# Patient Record
Sex: MALE | Race: WHITE | Employment: UNEMPLOYED | URBAN - METROPOLITAN AREA
[De-identification: names, ages, dates, MRNs, and addresses within clinical notes are randomized per-mention and may not be internally consistent; named-entity substitution may affect disease eponyms.]

---

## 2017-01-01 ENCOUNTER — APPOINTMENT (OUTPATIENT)
Dept: GENERAL RADIOLOGY | Age: 52
DRG: 441 | End: 2017-01-01
Attending: HOSPITALIST
Payer: SELF-PAY

## 2017-01-01 ENCOUNTER — PATIENT OUTREACH (OUTPATIENT)
Dept: INTERNAL MEDICINE CLINIC | Age: 52
End: 2017-01-01

## 2017-01-01 ENCOUNTER — HOSPITAL ENCOUNTER (INPATIENT)
Age: 52
LOS: 4 days | Discharge: HOME | DRG: 442 | End: 2017-07-03
Attending: EMERGENCY MEDICINE | Admitting: INTERNAL MEDICINE
Payer: SELF-PAY

## 2017-01-01 ENCOUNTER — APPOINTMENT (OUTPATIENT)
Dept: ULTRASOUND IMAGING | Age: 52
DRG: 442 | End: 2017-01-01
Attending: PHYSICIAN ASSISTANT
Payer: SELF-PAY

## 2017-01-01 ENCOUNTER — DOCUMENTATION ONLY (OUTPATIENT)
Dept: INTERNAL MEDICINE CLINIC | Age: 52
End: 2017-01-01

## 2017-01-01 ENCOUNTER — APPOINTMENT (OUTPATIENT)
Dept: MRI IMAGING | Age: 52
DRG: 442 | End: 2017-01-01
Attending: INTERNAL MEDICINE
Payer: SELF-PAY

## 2017-01-01 ENCOUNTER — OFFICE VISIT (OUTPATIENT)
Dept: INTERNAL MEDICINE CLINIC | Age: 52
End: 2017-01-01

## 2017-01-01 ENCOUNTER — HOSPITAL ENCOUNTER (INPATIENT)
Age: 52
LOS: 6 days | DRG: 441 | End: 2017-07-16
Attending: EMERGENCY MEDICINE | Admitting: INTERNAL MEDICINE
Payer: SELF-PAY

## 2017-01-01 ENCOUNTER — APPOINTMENT (OUTPATIENT)
Dept: GENERAL RADIOLOGY | Age: 52
DRG: 441 | End: 2017-01-01
Attending: ANESTHESIOLOGY
Payer: SELF-PAY

## 2017-01-01 ENCOUNTER — TELEPHONE (OUTPATIENT)
Dept: INTERNAL MEDICINE CLINIC | Age: 52
End: 2017-01-01

## 2017-01-01 ENCOUNTER — APPOINTMENT (OUTPATIENT)
Dept: CT IMAGING | Age: 52
DRG: 441 | End: 2017-01-01
Attending: INTERNAL MEDICINE
Payer: SELF-PAY

## 2017-01-01 ENCOUNTER — APPOINTMENT (OUTPATIENT)
Dept: ULTRASOUND IMAGING | Age: 52
DRG: 441 | End: 2017-01-01
Attending: INTERNAL MEDICINE
Payer: SELF-PAY

## 2017-01-01 VITALS
HEART RATE: 70 BPM | SYSTOLIC BLOOD PRESSURE: 103 MMHG | WEIGHT: 146.4 LBS | TEMPERATURE: 97.6 F | RESPIRATION RATE: 20 BRPM | DIASTOLIC BLOOD PRESSURE: 70 MMHG | BODY MASS INDEX: 20.5 KG/M2 | HEIGHT: 71 IN | OXYGEN SATURATION: 98 %

## 2017-01-01 VITALS
TEMPERATURE: 99.3 F | DIASTOLIC BLOOD PRESSURE: 27 MMHG | OXYGEN SATURATION: 95 % | RESPIRATION RATE: 22 BRPM | BODY MASS INDEX: 17.8 KG/M2 | SYSTOLIC BLOOD PRESSURE: 56 MMHG | WEIGHT: 131.39 LBS | HEIGHT: 72 IN | HEART RATE: 145 BPM

## 2017-01-01 VITALS
RESPIRATION RATE: 16 BRPM | HEIGHT: 71 IN | TEMPERATURE: 98.3 F | SYSTOLIC BLOOD PRESSURE: 122 MMHG | HEART RATE: 75 BPM | BODY MASS INDEX: 20.58 KG/M2 | DIASTOLIC BLOOD PRESSURE: 76 MMHG | WEIGHT: 147 LBS | OXYGEN SATURATION: 95 %

## 2017-01-01 VITALS
DIASTOLIC BLOOD PRESSURE: 69 MMHG | RESPIRATION RATE: 16 BRPM | OXYGEN SATURATION: 96 % | HEIGHT: 71 IN | SYSTOLIC BLOOD PRESSURE: 101 MMHG | BODY MASS INDEX: 19.4 KG/M2 | WEIGHT: 138.6 LBS | HEART RATE: 85 BPM | TEMPERATURE: 97.7 F

## 2017-01-01 VITALS
DIASTOLIC BLOOD PRESSURE: 60 MMHG | SYSTOLIC BLOOD PRESSURE: 95 MMHG | RESPIRATION RATE: 16 BRPM | WEIGHT: 138.23 LBS | OXYGEN SATURATION: 93 % | BODY MASS INDEX: 19.35 KG/M2 | TEMPERATURE: 98.1 F | HEART RATE: 70 BPM | HEIGHT: 71 IN

## 2017-01-01 DIAGNOSIS — F32.A DEPRESSION, UNSPECIFIED DEPRESSION TYPE: Primary | Chronic | ICD-10-CM

## 2017-01-01 DIAGNOSIS — F32.A DEPRESSION, UNSPECIFIED DEPRESSION TYPE: ICD-10-CM

## 2017-01-01 DIAGNOSIS — B18.1 VIRAL HEPATITIS B CHRONIC (HCC): Primary | ICD-10-CM

## 2017-01-01 DIAGNOSIS — Z23 ENCOUNTER FOR IMMUNIZATION: ICD-10-CM

## 2017-01-01 DIAGNOSIS — R79.89 ABNORMAL LFTS: Primary | ICD-10-CM

## 2017-01-01 DIAGNOSIS — F12.90 MARIJUANA USE: ICD-10-CM

## 2017-01-01 DIAGNOSIS — R05.9 COUGH: Primary | ICD-10-CM

## 2017-01-01 DIAGNOSIS — B20 HIV (HUMAN IMMUNODEFICIENCY VIRUS INFECTION) (HCC): ICD-10-CM

## 2017-01-01 DIAGNOSIS — R17 JAUNDICE: Primary | ICD-10-CM

## 2017-01-01 DIAGNOSIS — B20 HUMAN IMMUNODEFICIENCY VIRUS (HIV) DISEASE (HCC): Primary | ICD-10-CM

## 2017-01-01 DIAGNOSIS — B19.10 HEP B W/O COMA: Primary | ICD-10-CM

## 2017-01-01 DIAGNOSIS — R05.9 COUGH: ICD-10-CM

## 2017-01-01 LAB
AFP-TM SERPL-MCNC: 5.3 NG/ML (ref 0–8.3)
AFP-TM SERPL-MCNC: 8.9 NG/ML (ref 0–8.3)
ALBUMIN SERPL BCP-MCNC: 1.1 G/DL (ref 3.5–5)
ALBUMIN SERPL BCP-MCNC: 1.6 G/DL (ref 3.5–5)
ALBUMIN SERPL BCP-MCNC: 1.7 G/DL (ref 3.5–5)
ALBUMIN SERPL BCP-MCNC: 1.7 G/DL (ref 3.5–5)
ALBUMIN SERPL BCP-MCNC: 1.9 G/DL (ref 3.5–5)
ALBUMIN SERPL BCP-MCNC: 2 G/DL (ref 3.5–5)
ALBUMIN SERPL BCP-MCNC: 2.3 G/DL (ref 3.5–5)
ALBUMIN SERPL BCP-MCNC: 2.3 G/DL (ref 3.5–5)
ALBUMIN SERPL BCP-MCNC: 2.5 G/DL (ref 3.5–5)
ALBUMIN SERPL BCP-MCNC: 2.5 G/DL (ref 3.5–5)
ALBUMIN SERPL BCP-MCNC: 2.6 G/DL (ref 3.5–5)
ALBUMIN SERPL BCP-MCNC: 3.2 G/DL (ref 3.5–5)
ALBUMIN SERPL-MCNC: 4.3 G/DL (ref 3.5–5.5)
ALBUMIN/GLOB SERPL: 0.3 {RATIO} (ref 1.1–2.2)
ALBUMIN/GLOB SERPL: 0.4 {RATIO} (ref 1.1–2.2)
ALBUMIN/GLOB SERPL: 0.5 {RATIO} (ref 1.1–2.2)
ALBUMIN/GLOB SERPL: 0.6 {RATIO} (ref 1.1–2.2)
ALBUMIN/GLOB SERPL: 1.7 {RATIO} (ref 1.1–2.5)
ALP SERPL-CCNC: 111 U/L (ref 45–117)
ALP SERPL-CCNC: 114 U/L (ref 45–117)
ALP SERPL-CCNC: 120 U/L (ref 45–117)
ALP SERPL-CCNC: 121 U/L (ref 45–117)
ALP SERPL-CCNC: 125 U/L (ref 45–117)
ALP SERPL-CCNC: 132 U/L (ref 45–117)
ALP SERPL-CCNC: 133 U/L (ref 45–117)
ALP SERPL-CCNC: 134 U/L (ref 45–117)
ALP SERPL-CCNC: 134 U/L (ref 45–117)
ALP SERPL-CCNC: 136 U/L (ref 45–117)
ALP SERPL-CCNC: 138 U/L (ref 45–117)
ALP SERPL-CCNC: 150 U/L (ref 45–117)
ALP SERPL-CCNC: 76 IU/L (ref 39–117)
ALT SERPL-CCNC: 1064 U/L (ref 12–78)
ALT SERPL-CCNC: 13 IU/L (ref 0–44)
ALT SERPL-CCNC: 289 U/L (ref 12–78)
ALT SERPL-CCNC: 443 U/L (ref 12–78)
ALT SERPL-CCNC: 452 U/L (ref 12–78)
ALT SERPL-CCNC: 500 U/L (ref 12–78)
ALT SERPL-CCNC: 531 U/L (ref 12–78)
ALT SERPL-CCNC: 543 U/L (ref 12–78)
ALT SERPL-CCNC: 680 U/L (ref 12–78)
ALT SERPL-CCNC: 874 U/L (ref 12–78)
ALT SERPL-CCNC: 884 U/L (ref 12–78)
ALT SERPL-CCNC: 895 U/L (ref 12–78)
ALT SERPL-CCNC: 917 U/L (ref 12–78)
AMMONIA PLAS-SCNC: 22 UMOL/L
AMMONIA PLAS-SCNC: 54 UMOL/L
AMMONIA PLAS-SCNC: <10 UMOL/L
AMPHET UR QL SCN: NEGATIVE
ANION GAP BLD CALC-SCNC: 22 MMOL/L (ref 5–15)
ANION GAP BLD CALC-SCNC: 3 MMOL/L (ref 5–15)
ANION GAP BLD CALC-SCNC: 4 MMOL/L (ref 5–15)
ANION GAP BLD CALC-SCNC: 5 MMOL/L (ref 5–15)
ANION GAP BLD CALC-SCNC: 6 MMOL/L (ref 5–15)
APAP SERPL-MCNC: <2 UG/ML (ref 10–30)
APPEARANCE UR: ABNORMAL
APPEARANCE UR: CLEAR
APTT PPP: 43.2 SEC (ref 22.1–32.5)
APTT PPP: 49.7 SEC (ref 22.1–32.5)
ARTERIAL PATENCY WRIST A: YES
AST SERPL W P-5'-P-CCNC: 1067 U/L (ref 15–37)
AST SERPL W P-5'-P-CCNC: 321 U/L (ref 15–37)
AST SERPL W P-5'-P-CCNC: 342 U/L (ref 15–37)
AST SERPL W P-5'-P-CCNC: 378 U/L (ref 15–37)
AST SERPL W P-5'-P-CCNC: 381 U/L (ref 15–37)
AST SERPL W P-5'-P-CCNC: 421 U/L (ref 15–37)
AST SERPL W P-5'-P-CCNC: 439 U/L (ref 15–37)
AST SERPL W P-5'-P-CCNC: 560 U/L (ref 15–37)
AST SERPL W P-5'-P-CCNC: 852 U/L (ref 15–37)
AST SERPL W P-5'-P-CCNC: 868 U/L (ref 15–37)
AST SERPL W P-5'-P-CCNC: 875 U/L (ref 15–37)
AST SERPL W P-5'-P-CCNC: 939 U/L (ref 15–37)
AST SERPL-CCNC: 18 IU/L (ref 0–40)
ATRIAL RATE: 154 BPM
ATRIAL RATE: 77 BPM
BACTERIA SPEC CULT: NORMAL
BACTERIA URNS QL MICRO: NEGATIVE /HPF
BACTERIA URNS QL MICRO: NEGATIVE /HPF
BARBITURATES UR QL SCN: NEGATIVE
BASE DEFICIT BLDA-SCNC: 19.6 MMOL/L
BASOPHILS # BLD AUTO: 0 K/UL (ref 0–0.1)
BASOPHILS # BLD AUTO: 0 X10E3/UL (ref 0–0.2)
BASOPHILS # BLD AUTO: 0 X10E3/UL (ref 0–0.2)
BASOPHILS # BLD: 0 % (ref 0–1)
BASOPHILS NFR BLD AUTO: 0 %
BASOPHILS NFR BLD AUTO: 0 %
BDY SITE: ABNORMAL
BENZODIAZ UR QL: NEGATIVE
BILIRUB DIRECT SERPL-MCNC: 4.6 MG/DL (ref 0–0.2)
BILIRUB SERPL-MCNC: 1.1 MG/DL (ref 0–1.2)
BILIRUB SERPL-MCNC: 13.2 MG/DL (ref 0.2–1)
BILIRUB SERPL-MCNC: 15.3 MG/DL (ref 0.2–1)
BILIRUB SERPL-MCNC: 16.5 MG/DL (ref 0.2–1)
BILIRUB SERPL-MCNC: 17.9 MG/DL (ref 0.2–1)
BILIRUB SERPL-MCNC: 17.9 MG/DL (ref 0.2–1)
BILIRUB SERPL-MCNC: 19.4 MG/DL (ref 0.2–1)
BILIRUB SERPL-MCNC: 21.3 MG/DL (ref 0.2–1)
BILIRUB SERPL-MCNC: 3.8 MG/DL (ref 0.2–1)
BILIRUB SERPL-MCNC: 3.9 MG/DL (ref 0.2–1)
BILIRUB SERPL-MCNC: 4.6 MG/DL (ref 0.2–1)
BILIRUB SERPL-MCNC: 5.3 MG/DL (ref 0.2–1)
BILIRUB SERPL-MCNC: 6.5 MG/DL (ref 0.2–1)
BILIRUB UR QL CFM: NEGATIVE
BILIRUB UR QL CFM: POSITIVE
BREATHS.SPONTANEOUS ON VENT: 25
BUN SERPL-MCNC: 10 MG/DL (ref 6–20)
BUN SERPL-MCNC: 11 MG/DL (ref 6–20)
BUN SERPL-MCNC: 11 MG/DL (ref 6–24)
BUN SERPL-MCNC: 13 MG/DL (ref 6–20)
BUN SERPL-MCNC: 13 MG/DL (ref 6–20)
BUN SERPL-MCNC: 16 MG/DL (ref 6–20)
BUN SERPL-MCNC: 17 MG/DL (ref 6–20)
BUN SERPL-MCNC: 9 MG/DL (ref 6–20)
BUN/CREAT SERPL: 10 (ref 12–20)
BUN/CREAT SERPL: 12 (ref 12–20)
BUN/CREAT SERPL: 13 (ref 12–20)
BUN/CREAT SERPL: 13 (ref 9–20)
BUN/CREAT SERPL: 15 (ref 12–20)
BUN/CREAT SERPL: 15 (ref 12–20)
BUN/CREAT SERPL: 16 (ref 12–20)
BUN/CREAT SERPL: 17 (ref 12–20)
BUN/CREAT SERPL: 18 (ref 12–20)
BUN/CREAT SERPL: 18 (ref 12–20)
BUN/CREAT SERPL: 19 (ref 12–20)
BUN/CREAT SERPL: 20 (ref 12–20)
BUN/CREAT SERPL: 6 (ref 12–20)
CALCIUM SERPL-MCNC: 6.9 MG/DL (ref 8.5–10.1)
CALCIUM SERPL-MCNC: 7.8 MG/DL (ref 8.5–10.1)
CALCIUM SERPL-MCNC: 7.8 MG/DL (ref 8.5–10.1)
CALCIUM SERPL-MCNC: 8.1 MG/DL (ref 8.5–10.1)
CALCIUM SERPL-MCNC: 8.2 MG/DL (ref 8.5–10.1)
CALCIUM SERPL-MCNC: 8.3 MG/DL (ref 8.5–10.1)
CALCIUM SERPL-MCNC: 8.3 MG/DL (ref 8.5–10.1)
CALCIUM SERPL-MCNC: 8.6 MG/DL (ref 8.5–10.1)
CALCIUM SERPL-MCNC: 8.7 MG/DL (ref 8.5–10.1)
CALCIUM SERPL-MCNC: 8.8 MG/DL (ref 8.5–10.1)
CALCIUM SERPL-MCNC: 9.2 MG/DL (ref 8.7–10.2)
CALCULATED P AXIS, ECG09: 69 DEGREES
CALCULATED P AXIS, ECG09: 71 DEGREES
CALCULATED R AXIS, ECG10: 63 DEGREES
CALCULATED R AXIS, ECG10: 80 DEGREES
CALCULATED T AXIS, ECG11: 50 DEGREES
CALCULATED T AXIS, ECG11: 55 DEGREES
CANCER AG19-9 SERPL-ACNC: 7110 U/ML (ref 0–35)
CANNABINOIDS UR QL SCN: POSITIVE
CC UR VC: NORMAL
CD3+CD4+ CELLS # BLD: 569 /UL (ref 359–1519)
CD3+CD4+ CELLS # BLD: 986 /UL (ref 359–1519)
CD3+CD4+ CELLS NFR BLD: 23.7 % (ref 30.8–58.5)
CD3+CD4+ CELLS NFR BLD: 29 % (ref 30.8–58.5)
CEA SERPL-MCNC: 2.6 NG/ML
CHLORIDE SERPL-SCNC: 100 MMOL/L (ref 96–106)
CHLORIDE SERPL-SCNC: 100 MMOL/L (ref 97–108)
CHLORIDE SERPL-SCNC: 101 MMOL/L (ref 97–108)
CHLORIDE SERPL-SCNC: 101 MMOL/L (ref 97–108)
CHLORIDE SERPL-SCNC: 103 MMOL/L (ref 97–108)
CHLORIDE SERPL-SCNC: 94 MMOL/L (ref 97–108)
CHLORIDE SERPL-SCNC: 96 MMOL/L (ref 97–108)
CHLORIDE SERPL-SCNC: 97 MMOL/L (ref 97–108)
CHLORIDE SERPL-SCNC: 98 MMOL/L (ref 97–108)
CHLORIDE SERPL-SCNC: 99 MMOL/L (ref 97–108)
CHLORIDE SERPL-SCNC: 99 MMOL/L (ref 97–108)
CO2 SERPL-SCNC: 19 MMOL/L (ref 21–32)
CO2 SERPL-SCNC: 26 MMOL/L (ref 21–32)
CO2 SERPL-SCNC: 27 MMOL/L (ref 21–32)
CO2 SERPL-SCNC: 28 MMOL/L (ref 21–32)
CO2 SERPL-SCNC: 29 MMOL/L (ref 18–29)
CO2 SERPL-SCNC: 29 MMOL/L (ref 21–32)
CO2 SERPL-SCNC: 30 MMOL/L (ref 21–32)
CO2 SERPL-SCNC: 30 MMOL/L (ref 21–32)
CO2 SERPL-SCNC: 31 MMOL/L (ref 21–32)
CO2 SERPL-SCNC: 32 MMOL/L (ref 21–32)
CO2 SERPL-SCNC: 33 MMOL/L (ref 21–32)
COCAINE UR QL SCN: NEGATIVE
COLOR UR: ABNORMAL
COLOR UR: ABNORMAL
CREAT SERPL-MCNC: 0.55 MG/DL (ref 0.7–1.3)
CREAT SERPL-MCNC: 0.55 MG/DL (ref 0.7–1.3)
CREAT SERPL-MCNC: 0.59 MG/DL (ref 0.7–1.3)
CREAT SERPL-MCNC: 0.6 MG/DL (ref 0.7–1.3)
CREAT SERPL-MCNC: 0.65 MG/DL (ref 0.7–1.3)
CREAT SERPL-MCNC: 0.69 MG/DL (ref 0.7–1.3)
CREAT SERPL-MCNC: 0.71 MG/DL (ref 0.7–1.3)
CREAT SERPL-MCNC: 0.79 MG/DL (ref 0.7–1.3)
CREAT SERPL-MCNC: 0.81 MG/DL (ref 0.7–1.3)
CREAT SERPL-MCNC: 0.87 MG/DL (ref 0.76–1.27)
CREAT SERPL-MCNC: 0.91 MG/DL (ref 0.7–1.3)
CREAT SERPL-MCNC: 0.94 MG/DL (ref 0.7–1.3)
CREAT SERPL-MCNC: 2.76 MG/DL (ref 0.7–1.3)
DIAGNOSIS, 93000: NORMAL
DIAGNOSIS, 93000: NORMAL
DRUG SCRN COMMENT,DRGCM: ABNORMAL
EOSINOPHIL # BLD AUTO: 0.3 X10E3/UL (ref 0–0.4)
EOSINOPHIL # BLD AUTO: 0.4 X10E3/UL (ref 0–0.4)
EOSINOPHIL # BLD: 0.1 K/UL (ref 0–0.4)
EOSINOPHIL # BLD: 0.2 K/UL (ref 0–0.4)
EOSINOPHIL # BLD: 0.3 K/UL (ref 0–0.4)
EOSINOPHIL # BLD: 0.4 K/UL (ref 0–0.4)
EOSINOPHIL # BLD: 0.4 K/UL (ref 0–0.4)
EOSINOPHIL NFR BLD AUTO: 5 %
EOSINOPHIL NFR BLD AUTO: 6 %
EOSINOPHIL NFR BLD: 1 % (ref 0–7)
EOSINOPHIL NFR BLD: 3 % (ref 0–7)
EOSINOPHIL NFR BLD: 4 % (ref 0–7)
EOSINOPHIL NFR BLD: 5 % (ref 0–7)
EOSINOPHIL NFR BLD: 7 % (ref 0–7)
EPITH CASTS URNS QL MICRO: ABNORMAL /LPF
EPITH CASTS URNS QL MICRO: ABNORMAL /LPF
ERYTHROCYTE [DISTWIDTH] IN BLOOD BY AUTOMATED COUNT: 13.7 % (ref 12.3–15.4)
ERYTHROCYTE [DISTWIDTH] IN BLOOD BY AUTOMATED COUNT: 14.1 % (ref 11.5–14.5)
ERYTHROCYTE [DISTWIDTH] IN BLOOD BY AUTOMATED COUNT: 14.2 % (ref 11.5–14.5)
ERYTHROCYTE [DISTWIDTH] IN BLOOD BY AUTOMATED COUNT: 14.4 % (ref 11.5–14.5)
ERYTHROCYTE [DISTWIDTH] IN BLOOD BY AUTOMATED COUNT: 14.4 % (ref 12.3–15.4)
ERYTHROCYTE [DISTWIDTH] IN BLOOD BY AUTOMATED COUNT: 14.7 % (ref 11.5–14.5)
ERYTHROCYTE [DISTWIDTH] IN BLOOD BY AUTOMATED COUNT: 14.8 % (ref 11.5–14.5)
ERYTHROCYTE [DISTWIDTH] IN BLOOD BY AUTOMATED COUNT: 16.6 % (ref 11.5–14.5)
ERYTHROCYTE [DISTWIDTH] IN BLOOD BY AUTOMATED COUNT: 16.7 % (ref 11.5–14.5)
ERYTHROCYTE [DISTWIDTH] IN BLOOD BY AUTOMATED COUNT: 17.2 % (ref 11.5–14.5)
ERYTHROCYTE [DISTWIDTH] IN BLOOD BY AUTOMATED COUNT: 18.5 % (ref 11.5–14.5)
ETHANOL SERPL-MCNC: <10 MG/DL
FIO2 ON VENT: 100 %
GAS FLOW.O2 O2 DELIVERY SYS: 15 L/MIN
GLOBULIN SER CALC-MCNC: 2.5 G/DL (ref 2–4)
GLOBULIN SER CALC-MCNC: 2.6 G/DL (ref 1.5–4.5)
GLOBULIN SER CALC-MCNC: 4.1 G/DL (ref 2–4)
GLOBULIN SER CALC-MCNC: 4.4 G/DL (ref 2–4)
GLOBULIN SER CALC-MCNC: 4.5 G/DL (ref 2–4)
GLOBULIN SER CALC-MCNC: 4.6 G/DL (ref 2–4)
GLOBULIN SER CALC-MCNC: 4.6 G/DL (ref 2–4)
GLOBULIN SER CALC-MCNC: 4.7 G/DL (ref 2–4)
GLOBULIN SER CALC-MCNC: 4.8 G/DL (ref 2–4)
GLOBULIN SER CALC-MCNC: 5 G/DL (ref 2–4)
GLOBULIN SER CALC-MCNC: 5.2 G/DL (ref 2–4)
GLUCOSE SERPL-MCNC: 101 MG/DL (ref 65–100)
GLUCOSE SERPL-MCNC: 105 MG/DL (ref 65–100)
GLUCOSE SERPL-MCNC: 110 MG/DL (ref 65–100)
GLUCOSE SERPL-MCNC: 67 MG/DL (ref 65–100)
GLUCOSE SERPL-MCNC: 83 MG/DL (ref 65–100)
GLUCOSE SERPL-MCNC: 85 MG/DL (ref 65–100)
GLUCOSE SERPL-MCNC: 86 MG/DL (ref 65–100)
GLUCOSE SERPL-MCNC: 88 MG/DL (ref 65–100)
GLUCOSE SERPL-MCNC: 88 MG/DL (ref 65–100)
GLUCOSE SERPL-MCNC: 93 MG/DL (ref 65–100)
GLUCOSE SERPL-MCNC: 95 MG/DL (ref 65–99)
GLUCOSE SERPL-MCNC: 98 MG/DL (ref 65–100)
GLUCOSE SERPL-MCNC: 99 MG/DL (ref 65–100)
GLUCOSE UR STRIP.AUTO-MCNC: NEGATIVE MG/DL
GLUCOSE UR STRIP.AUTO-MCNC: NEGATIVE MG/DL
HAV IGM SERPL QL IA: NONREACTIVE
HBV CORE AB SERPL QL IA: POSITIVE
HBV CORE IGM SER QL: NONREACTIVE
HBV DNA SERPL NAA+PROBE-ACNC: NORMAL IU/ML
HBV DNA SERPL NAA+PROBE-LOG IU: NORMAL LOG10IU/ML
HBV E AB SERPL QL IA: NEGATIVE
HBV E AG SERPL QL IA: POSITIVE
HBV SURFACE AB SER QL: NONREACTIVE
HBV SURFACE AB SER-ACNC: <3.1 MIU/ML
HBV SURFACE AG SER QL: 654.91 INDEX
HBV SURFACE AG SER QL: POSITIVE
HCO3 BLDA-SCNC: 15 MMOL/L (ref 22–26)
HCT VFR BLD AUTO: 29.2 % (ref 36.6–50.3)
HCT VFR BLD AUTO: 38.3 % (ref 36.6–50.3)
HCT VFR BLD AUTO: 42.1 % (ref 36.6–50.3)
HCT VFR BLD AUTO: 43.1 % (ref 36.6–50.3)
HCT VFR BLD AUTO: 43.8 % (ref 36.6–50.3)
HCT VFR BLD AUTO: 44.1 % (ref 36.6–50.3)
HCT VFR BLD AUTO: 44.2 % (ref 36.6–50.3)
HCT VFR BLD AUTO: 44.2 % (ref 37.5–51)
HCT VFR BLD AUTO: 44.9 % (ref 36.6–50.3)
HCT VFR BLD AUTO: 47.5 % (ref 37.5–51)
HCT VFR BLD AUTO: 48.7 % (ref 36.6–50.3)
HCV AB SERPL QL IA: NONREACTIVE
HCV COMMENT,HCGAC: ABNORMAL
HGB BLD-MCNC: 13.1 G/DL (ref 12.1–17)
HGB BLD-MCNC: 15 G/DL (ref 12.1–17)
HGB BLD-MCNC: 15.1 G/DL (ref 12.1–17)
HGB BLD-MCNC: 15.2 G/DL (ref 12.1–17)
HGB BLD-MCNC: 15.2 G/DL (ref 12.1–17)
HGB BLD-MCNC: 15.4 G/DL (ref 12.1–17)
HGB BLD-MCNC: 15.4 G/DL (ref 12.1–17)
HGB BLD-MCNC: 15.5 G/DL (ref 12.6–17.7)
HGB BLD-MCNC: 16.9 G/DL (ref 12.6–17.7)
HGB BLD-MCNC: 17 G/DL (ref 12.1–17)
HGB BLD-MCNC: 9.7 G/DL (ref 12.1–17)
HGB UR QL STRIP: NEGATIVE
HGB UR QL STRIP: NEGATIVE
HIV GENOSURE, 551656: NORMAL
HIV RT+PR MUT DET ISLT: NORMAL
HIV1 RNA # SERPL NAA+PROBE: <20 COPIES/ML
HIV1 RNA # SERPL NAA+PROBE: NORMAL COPIES/ML
HIV1 RNA SERPL NAA+PROBE-LOG#: 5.06 LOG10COPY/ML
HIV1 RNA SERPL NAA+PROBE-LOG#: NORMAL LOG10COPY/ML
IMM GRANULOCYTES # BLD: 0 X10E3/UL (ref 0–0.1)
IMM GRANULOCYTES # BLD: 0 X10E3/UL (ref 0–0.1)
IMM GRANULOCYTES NFR BLD: 0 %
IMM GRANULOCYTES NFR BLD: 0 %
INR PPP: 1.6 (ref 0.9–1.1)
INR PPP: 1.6 (ref 0.9–1.1)
INR PPP: 1.7 (ref 0.9–1.1)
INR PPP: 1.8 (ref 0.9–1.1)
INR PPP: 1.9 (ref 0.9–1.1)
INR PPP: 2.4 (ref 0.9–1.1)
INR PPP: 2.7 (ref 0.9–1.1)
INR PPP: 2.7 (ref 0.9–1.1)
INR PPP: 2.8 (ref 0.9–1.1)
INR PPP: 2.9 (ref 0.9–1.1)
INR PPP: 3.3 (ref 0.9–1.1)
INR PPP: 4.2 (ref 0.9–1.1)
KETONES UR QL STRIP.AUTO: ABNORMAL MG/DL
KETONES UR QL STRIP.AUTO: NEGATIVE MG/DL
LACTATE SERPL-SCNC: 1.9 MMOL/L (ref 0.4–2)
LACTATE SERPL-SCNC: 16.3 MMOL/L (ref 0.4–2)
LACTATE SERPL-SCNC: 2.5 MMOL/L (ref 0.4–2)
LACTATE SERPL-SCNC: 2.6 MMOL/L (ref 0.4–2)
LACTATE SERPL-SCNC: 3.6 MMOL/L (ref 0.4–2)
LEUKOCYTE ESTERASE UR QL STRIP.AUTO: ABNORMAL
LEUKOCYTE ESTERASE UR QL STRIP.AUTO: NEGATIVE
LIPASE SERPL-CCNC: 157 U/L (ref 73–393)
LYMPHOCYTES # BLD AUTO: 2.4 X10E3/UL (ref 0.7–3.1)
LYMPHOCYTES # BLD AUTO: 25 % (ref 12–49)
LYMPHOCYTES # BLD AUTO: 3.4 X10E3/UL (ref 0.7–3.1)
LYMPHOCYTES # BLD AUTO: 39 % (ref 12–49)
LYMPHOCYTES # BLD AUTO: 40 % (ref 12–49)
LYMPHOCYTES # BLD AUTO: 46 % (ref 12–49)
LYMPHOCYTES # BLD AUTO: 51 % (ref 12–49)
LYMPHOCYTES # BLD: 2.3 K/UL (ref 0.8–3.5)
LYMPHOCYTES # BLD: 2.4 K/UL (ref 0.8–3.5)
LYMPHOCYTES # BLD: 2.7 K/UL (ref 0.8–3.5)
LYMPHOCYTES # BLD: 3.5 K/UL (ref 0.8–3.5)
LYMPHOCYTES # BLD: 3.8 K/UL (ref 0.8–3.5)
LYMPHOCYTES NFR BLD AUTO: 38 %
LYMPHOCYTES NFR BLD AUTO: 47 %
Lab: NORMAL
MAGNESIUM SERPL-MCNC: 2 MG/DL (ref 1.6–2.4)
MCH RBC QN AUTO: 28.8 PG (ref 26–34)
MCH RBC QN AUTO: 28.8 PG (ref 26–34)
MCH RBC QN AUTO: 29.5 PG (ref 26–34)
MCH RBC QN AUTO: 29.7 PG (ref 26–34)
MCH RBC QN AUTO: 29.8 PG (ref 26–34)
MCH RBC QN AUTO: 29.9 PG (ref 26–34)
MCH RBC QN AUTO: 30 PG (ref 26.6–33)
MCH RBC QN AUTO: 30 PG (ref 26–34)
MCH RBC QN AUTO: 30.3 PG (ref 26–34)
MCH RBC QN AUTO: 30.3 PG (ref 26–34)
MCH RBC QN AUTO: 33.7 PG (ref 26.6–33)
MCHC RBC AUTO-ENTMCNC: 33.2 G/DL (ref 30–36.5)
MCHC RBC AUTO-ENTMCNC: 34.2 G/DL (ref 30–36.5)
MCHC RBC AUTO-ENTMCNC: 34.3 G/DL (ref 30–36.5)
MCHC RBC AUTO-ENTMCNC: 34.5 G/DL (ref 30–36.5)
MCHC RBC AUTO-ENTMCNC: 34.5 G/DL (ref 30–36.5)
MCHC RBC AUTO-ENTMCNC: 34.8 G/DL (ref 30–36.5)
MCHC RBC AUTO-ENTMCNC: 34.9 G/DL (ref 30–36.5)
MCHC RBC AUTO-ENTMCNC: 35.1 G/DL (ref 31.5–35.7)
MCHC RBC AUTO-ENTMCNC: 35.3 G/DL (ref 30–36.5)
MCHC RBC AUTO-ENTMCNC: 35.6 G/DL (ref 30–36.5)
MCHC RBC AUTO-ENTMCNC: 35.6 G/DL (ref 31.5–35.7)
MCV RBC AUTO: 83.5 FL (ref 80–99)
MCV RBC AUTO: 83.5 FL (ref 80–99)
MCV RBC AUTO: 84.2 FL (ref 80–99)
MCV RBC AUTO: 86 FL (ref 79–97)
MCV RBC AUTO: 86.6 FL (ref 80–99)
MCV RBC AUTO: 86.7 FL (ref 80–99)
MCV RBC AUTO: 86.7 FL (ref 80–99)
MCV RBC AUTO: 86.8 FL (ref 80–99)
MCV RBC AUTO: 86.8 FL (ref 80–99)
MCV RBC AUTO: 87 FL (ref 80–99)
MCV RBC AUTO: 95 FL (ref 79–97)
METHADONE UR QL: NEGATIVE
MONOCYTES # BLD AUTO: 0.6 X10E3/UL (ref 0.1–0.9)
MONOCYTES # BLD AUTO: 0.7 X10E3/UL (ref 0.1–0.9)
MONOCYTES # BLD: 0.6 K/UL (ref 0–1)
MONOCYTES # BLD: 0.6 K/UL (ref 0–1)
MONOCYTES # BLD: 1 K/UL (ref 0–1)
MONOCYTES # BLD: 1.3 K/UL (ref 0–1)
MONOCYTES # BLD: 1.4 K/UL (ref 0–1)
MONOCYTES NFR BLD AUTO: 11 %
MONOCYTES NFR BLD AUTO: 12 % (ref 5–13)
MONOCYTES NFR BLD AUTO: 13 % (ref 5–13)
MONOCYTES NFR BLD AUTO: 14 % (ref 5–13)
MONOCYTES NFR BLD AUTO: 14 % (ref 5–13)
MONOCYTES NFR BLD AUTO: 8 %
MONOCYTES NFR BLD AUTO: 9 % (ref 5–13)
MUCOUS THREADS URNS QL MICRO: ABNORMAL /LPF
NEUTROPHILS # BLD AUTO: 1.8 X10E3/UL (ref 1.4–7)
NEUTROPHILS # BLD AUTO: 4.4 X10E3/UL (ref 1.4–7)
NEUTROPHILS NFR BLD AUTO: 36 %
NEUTROPHILS NFR BLD AUTO: 49 %
NEUTS SEG # BLD: 1.8 K/UL (ref 1.8–8)
NEUTS SEG # BLD: 2.5 K/UL (ref 1.8–8)
NEUTS SEG # BLD: 3.1 K/UL (ref 1.8–8)
NEUTS SEG # BLD: 3.9 K/UL (ref 1.8–8)
NEUTS SEG # BLD: 5.8 K/UL (ref 1.8–8)
NEUTS SEG NFR BLD AUTO: 35 % (ref 32–75)
NEUTS SEG NFR BLD AUTO: 35 % (ref 32–75)
NEUTS SEG NFR BLD AUTO: 42 % (ref 32–75)
NEUTS SEG NFR BLD AUTO: 44 % (ref 32–75)
NEUTS SEG NFR BLD AUTO: 61 % (ref 32–75)
NITRITE UR QL STRIP.AUTO: NEGATIVE
NITRITE UR QL STRIP.AUTO: NEGATIVE
OPIATES UR QL: NEGATIVE
P-R INTERVAL, ECG05: 120 MS
P-R INTERVAL, ECG05: 122 MS
PCO2 BLDA: 85 MMHG (ref 35–45)
PCP UR QL: NEGATIVE
PH BLDA: 6.87 [PH] (ref 7.35–7.45)
PH UR STRIP: 6 [PH] (ref 5–8)
PH UR STRIP: 6 [PH] (ref 5–8)
PLATELET # BLD AUTO: 101 K/UL (ref 150–400)
PLATELET # BLD AUTO: 122 X10E3/UL (ref 150–379)
PLATELET # BLD AUTO: 123 K/UL (ref 150–400)
PLATELET # BLD AUTO: 138 K/UL (ref 150–400)
PLATELET # BLD AUTO: 141 K/UL (ref 150–400)
PLATELET # BLD AUTO: 148 K/UL (ref 150–400)
PLATELET # BLD AUTO: 173 K/UL (ref 150–400)
PLATELET # BLD AUTO: 205 X10E3/UL (ref 150–379)
PLATELET # BLD AUTO: 84 K/UL (ref 150–400)
PLATELET # BLD AUTO: 99 K/UL (ref 150–400)
PLATELET # BLD AUTO: 99 K/UL (ref 150–400)
PO2 BLDA: 43 MMHG (ref 80–100)
POTASSIUM SERPL-SCNC: 3.5 MMOL/L (ref 3.5–5.1)
POTASSIUM SERPL-SCNC: 3.6 MMOL/L (ref 3.5–5.1)
POTASSIUM SERPL-SCNC: 3.6 MMOL/L (ref 3.5–5.1)
POTASSIUM SERPL-SCNC: 3.8 MMOL/L (ref 3.5–5.1)
POTASSIUM SERPL-SCNC: 3.8 MMOL/L (ref 3.5–5.1)
POTASSIUM SERPL-SCNC: 4 MMOL/L (ref 3.5–5.1)
POTASSIUM SERPL-SCNC: 4.2 MMOL/L (ref 3.5–5.1)
POTASSIUM SERPL-SCNC: 4.4 MMOL/L (ref 3.5–5.1)
POTASSIUM SERPL-SCNC: 4.5 MMOL/L (ref 3.5–5.1)
POTASSIUM SERPL-SCNC: 4.5 MMOL/L (ref 3.5–5.2)
POTASSIUM SERPL-SCNC: 5 MMOL/L (ref 3.5–5.1)
PROT SERPL-MCNC: 3.6 G/DL (ref 6.4–8.2)
PROT SERPL-MCNC: 6.1 G/DL (ref 6.4–8.2)
PROT SERPL-MCNC: 6.1 G/DL (ref 6.4–8.2)
PROT SERPL-MCNC: 6.2 G/DL (ref 6.4–8.2)
PROT SERPL-MCNC: 6.4 G/DL (ref 6.4–8.2)
PROT SERPL-MCNC: 6.8 G/DL (ref 6.4–8.2)
PROT SERPL-MCNC: 6.9 G/DL (ref 6.4–8.2)
PROT SERPL-MCNC: 6.9 G/DL (ref 6.4–8.2)
PROT SERPL-MCNC: 6.9 G/DL (ref 6–8.5)
PROT SERPL-MCNC: 7 G/DL (ref 6.4–8.2)
PROT SERPL-MCNC: 7.1 G/DL (ref 6.4–8.2)
PROT SERPL-MCNC: 7.1 G/DL (ref 6.4–8.2)
PROT SERPL-MCNC: 8.4 G/DL (ref 6.4–8.2)
PROT UR STRIP-MCNC: NEGATIVE MG/DL
PROT UR STRIP-MCNC: NEGATIVE MG/DL
PROTHROMBIN TIME: 16.3 SEC (ref 9–11.1)
PROTHROMBIN TIME: 16.4 SEC (ref 9–11.1)
PROTHROMBIN TIME: 17.4 SEC (ref 9–11.1)
PROTHROMBIN TIME: 18 SEC (ref 9–11.1)
PROTHROMBIN TIME: 19.2 SEC (ref 9–11.1)
PROTHROMBIN TIME: 25.3 SEC (ref 9–11.1)
PROTHROMBIN TIME: 27.7 SEC (ref 9–11.1)
PROTHROMBIN TIME: 28.1 SEC (ref 9–11.1)
PROTHROMBIN TIME: 29.4 SEC (ref 9–11.1)
PROTHROMBIN TIME: 29.8 SEC (ref 9–11.1)
PROTHROMBIN TIME: 34.1 SEC (ref 9–11.1)
PROTHROMBIN TIME: 44.8 SEC (ref 9–11.1)
Q-T INTERVAL, ECG07: 330 MS
Q-T INTERVAL, ECG07: 372 MS
QRS DURATION, ECG06: 74 MS
QRS DURATION, ECG06: 78 MS
QTC CALCULATION (BEZET), ECG08: 420 MS
QTC CALCULATION (BEZET), ECG08: 528 MS
RBC # BLD AUTO: 3.37 M/UL (ref 4.1–5.7)
RBC # BLD AUTO: 4.55 M/UL (ref 4.1–5.7)
RBC # BLD AUTO: 5.02 X10E6/UL (ref 4.14–5.8)
RBC # BLD AUTO: 5.04 M/UL (ref 4.1–5.7)
RBC # BLD AUTO: 5.05 M/UL (ref 4.1–5.7)
RBC # BLD AUTO: 5.07 M/UL (ref 4.1–5.7)
RBC # BLD AUTO: 5.09 M/UL (ref 4.1–5.7)
RBC # BLD AUTO: 5.16 M/UL (ref 4.1–5.7)
RBC # BLD AUTO: 5.16 X10E6/UL (ref 4.14–5.8)
RBC # BLD AUTO: 5.18 M/UL (ref 4.1–5.7)
RBC # BLD AUTO: 5.61 M/UL (ref 4.1–5.7)
RBC #/AREA URNS HPF: ABNORMAL /HPF (ref 0–5)
RBC #/AREA URNS HPF: ABNORMAL /HPF (ref 0–5)
REFERENCE LAB,REFLB: NORMAL
SALICYLATES SERPL-MCNC: <1.7 MG/DL (ref 2.8–20)
SAO2 % BLD: 47 % (ref 92–97)
SAO2% DEVICE SAO2% SENSOR NAME: ABNORMAL
SERVICE CMNT-IMP: ABNORMAL
SERVICE CMNT-IMP: NORMAL
SODIUM SERPL-SCNC: 132 MMOL/L (ref 136–145)
SODIUM SERPL-SCNC: 133 MMOL/L (ref 136–145)
SODIUM SERPL-SCNC: 134 MMOL/L (ref 136–145)
SODIUM SERPL-SCNC: 134 MMOL/L (ref 136–145)
SODIUM SERPL-SCNC: 135 MMOL/L (ref 136–145)
SODIUM SERPL-SCNC: 137 MMOL/L (ref 136–145)
SODIUM SERPL-SCNC: 138 MMOL/L (ref 136–145)
SODIUM SERPL-SCNC: 141 MMOL/L (ref 134–144)
SP GR UR REFRACTOMETRY: 1.02 (ref 1–1.03)
SP GR UR REFRACTOMETRY: 1.02 (ref 1–1.03)
SP1: ABNORMAL
SP2: ABNORMAL
SP3: ABNORMAL
SPECIMEN SITE: ABNORMAL
TEST DESCRIPTION:,ATST: NORMAL
TEST INFORMATION: NORMAL
THERAPEUTIC RANGE,PTTT: ABNORMAL SECS (ref 58–77)
THERAPEUTIC RANGE,PTTT: ABNORMAL SECS (ref 58–77)
TSH SERPL DL<=0.05 MIU/L-ACNC: 0.98 UIU/ML (ref 0.36–3.74)
UA: UC IF INDICATED,UAUC: ABNORMAL
UA: UC IF INDICATED,UAUC: ABNORMAL
UROBILINOGEN UR QL STRIP.AUTO: 1 EU/DL (ref 0.2–1)
UROBILINOGEN UR QL STRIP.AUTO: 1 EU/DL (ref 0.2–1)
VENTILATION MODE VENT: ABNORMAL
VENTRICULAR RATE, ECG03: 154 BPM
VENTRICULAR RATE, ECG03: 77 BPM
WBC # BLD AUTO: 1.3 K/UL (ref 4.1–11.1)
WBC # BLD AUTO: 5.1 K/UL (ref 4.1–11.1)
WBC # BLD AUTO: 5.1 X10E3/UL (ref 3.4–10.8)
WBC # BLD AUTO: 5.2 K/UL (ref 4.1–11.1)
WBC # BLD AUTO: 5.4 K/UL (ref 4.1–11.1)
WBC # BLD AUTO: 5.6 K/UL (ref 4.1–11.1)
WBC # BLD AUTO: 6.9 K/UL (ref 4.1–11.1)
WBC # BLD AUTO: 7 K/UL (ref 4.1–11.1)
WBC # BLD AUTO: 8.9 X10E3/UL (ref 3.4–10.8)
WBC # BLD AUTO: 9.4 K/UL (ref 4.1–11.1)
WBC # BLD AUTO: 9.6 K/UL (ref 4.1–11.1)
WBC URNS QL MICRO: ABNORMAL /HPF (ref 0–4)
WBC URNS QL MICRO: ABNORMAL /HPF (ref 0–4)

## 2017-01-01 PROCEDURE — 36415 COLL VENOUS BLD VENIPUNCTURE: CPT | Performed by: INTERNAL MEDICINE

## 2017-01-01 PROCEDURE — 85610 PROTHROMBIN TIME: CPT | Performed by: PHYSICIAN ASSISTANT

## 2017-01-01 PROCEDURE — 83605 ASSAY OF LACTIC ACID: CPT | Performed by: INTERNAL MEDICINE

## 2017-01-01 PROCEDURE — 74011250637 HC RX REV CODE- 250/637: Performed by: INTERNAL MEDICINE

## 2017-01-01 PROCEDURE — 82140 ASSAY OF AMMONIA: CPT | Performed by: INTERNAL MEDICINE

## 2017-01-01 PROCEDURE — 87086 URINE CULTURE/COLONY COUNT: CPT | Performed by: EMERGENCY MEDICINE

## 2017-01-01 PROCEDURE — 74011000250 HC RX REV CODE- 250

## 2017-01-01 PROCEDURE — 85025 COMPLETE CBC W/AUTO DIFF WBC: CPT | Performed by: INTERNAL MEDICINE

## 2017-01-01 PROCEDURE — P9047 ALBUMIN (HUMAN), 25%, 50ML: HCPCS | Performed by: INTERNAL MEDICINE

## 2017-01-01 PROCEDURE — 74183 MRI ABD W/O CNTR FLWD CNTR: CPT

## 2017-01-01 PROCEDURE — 76700 US EXAM ABDOM COMPLETE: CPT

## 2017-01-01 PROCEDURE — 85610 PROTHROMBIN TIME: CPT | Performed by: INTERNAL MEDICINE

## 2017-01-01 PROCEDURE — 0BH17EZ INSERTION OF ENDOTRACHEAL AIRWAY INTO TRACHEA, VIA NATURAL OR ARTIFICIAL OPENING: ICD-10-PCS | Performed by: ANESTHESIOLOGY

## 2017-01-01 PROCEDURE — 65660000000 HC RM CCU STEPDOWN

## 2017-01-01 PROCEDURE — 85025 COMPLETE CBC W/AUTO DIFF WBC: CPT | Performed by: EMERGENCY MEDICINE

## 2017-01-01 PROCEDURE — 85730 THROMBOPLASTIN TIME PARTIAL: CPT | Performed by: PHYSICIAN ASSISTANT

## 2017-01-01 PROCEDURE — 74011250636 HC RX REV CODE- 250/636: Performed by: INTERNAL MEDICINE

## 2017-01-01 PROCEDURE — 80053 COMPREHEN METABOLIC PANEL: CPT | Performed by: HOSPITALIST

## 2017-01-01 PROCEDURE — 85027 COMPLETE CBC AUTOMATED: CPT | Performed by: INTERNAL MEDICINE

## 2017-01-01 PROCEDURE — 65270000015 HC RM PRIVATE ONCOLOGY

## 2017-01-01 PROCEDURE — 99285 EMERGENCY DEPT VISIT HI MDM: CPT

## 2017-01-01 PROCEDURE — 80053 COMPREHEN METABOLIC PANEL: CPT | Performed by: INTERNAL MEDICINE

## 2017-01-01 PROCEDURE — 81001 URINALYSIS AUTO W/SCOPE: CPT | Performed by: EMERGENCY MEDICINE

## 2017-01-01 PROCEDURE — 80307 DRUG TEST PRSMV CHEM ANLYZR: CPT | Performed by: PHYSICIAN ASSISTANT

## 2017-01-01 PROCEDURE — 81001 URINALYSIS AUTO W/SCOPE: CPT | Performed by: PHYSICIAN ASSISTANT

## 2017-01-01 PROCEDURE — 74011000258 HC RX REV CODE- 258: Performed by: HOSPITALIST

## 2017-01-01 PROCEDURE — 87350 HEPATITIS BE AG IA: CPT | Performed by: INTERNAL MEDICINE

## 2017-01-01 PROCEDURE — 74011250636 HC RX REV CODE- 250/636: Performed by: HOSPITALIST

## 2017-01-01 PROCEDURE — 86361 T CELL ABSOLUTE COUNT: CPT | Performed by: INTERNAL MEDICINE

## 2017-01-01 PROCEDURE — 71010 XR CHEST PORT: CPT

## 2017-01-01 PROCEDURE — 31500 INSERT EMERGENCY AIRWAY: CPT

## 2017-01-01 PROCEDURE — 86707 HEPATITIS BE ANTIBODY: CPT | Performed by: INTERNAL MEDICINE

## 2017-01-01 PROCEDURE — 82105 ALPHA-FETOPROTEIN SERUM: CPT | Performed by: INTERNAL MEDICINE

## 2017-01-01 PROCEDURE — 82140 ASSAY OF AMMONIA: CPT | Performed by: EMERGENCY MEDICINE

## 2017-01-01 PROCEDURE — 74011000258 HC RX REV CODE- 258: Performed by: INTERNAL MEDICINE

## 2017-01-01 PROCEDURE — 86301 IMMUNOASSAY TUMOR CA 19-9: CPT | Performed by: INTERNAL MEDICINE

## 2017-01-01 PROCEDURE — 36415 COLL VENOUS BLD VENIPUNCTURE: CPT | Performed by: EMERGENCY MEDICINE

## 2017-01-01 PROCEDURE — 82803 BLOOD GASES ANY COMBINATION: CPT | Performed by: HOSPITALIST

## 2017-01-01 PROCEDURE — 82105 ALPHA-FETOPROTEIN SERUM: CPT | Performed by: HOSPITALIST

## 2017-01-01 PROCEDURE — 74177 CT ABD & PELVIS W/CONTRAST: CPT

## 2017-01-01 PROCEDURE — 77030011256 HC DRSG MEPILEX <16IN NO BORD MOLN -A

## 2017-01-01 PROCEDURE — 36600 WITHDRAWAL OF ARTERIAL BLOOD: CPT | Performed by: HOSPITALIST

## 2017-01-01 PROCEDURE — 86705 HEP B CORE ANTIBODY IGM: CPT | Performed by: INTERNAL MEDICINE

## 2017-01-01 PROCEDURE — 74000 XR ABD PORT  1 V: CPT

## 2017-01-01 PROCEDURE — 87901 NFCT AGT GNTYP ALYS HIV1 REV: CPT | Performed by: INTERNAL MEDICINE

## 2017-01-01 PROCEDURE — 87517 HEPATITIS B DNA QUANT: CPT | Performed by: INTERNAL MEDICINE

## 2017-01-01 PROCEDURE — 82378 CARCINOEMBRYONIC ANTIGEN: CPT | Performed by: INTERNAL MEDICINE

## 2017-01-01 PROCEDURE — 93005 ELECTROCARDIOGRAM TRACING: CPT

## 2017-01-01 PROCEDURE — 80053 COMPREHEN METABOLIC PANEL: CPT | Performed by: EMERGENCY MEDICINE

## 2017-01-01 PROCEDURE — 93041 RHYTHM ECG TRACING: CPT

## 2017-01-01 PROCEDURE — 85610 PROTHROMBIN TIME: CPT | Performed by: EMERGENCY MEDICINE

## 2017-01-01 PROCEDURE — 74011636320 HC RX REV CODE- 636/320: Performed by: HOSPITALIST

## 2017-01-01 PROCEDURE — 97116 GAIT TRAINING THERAPY: CPT

## 2017-01-01 PROCEDURE — 86706 HEP B SURFACE ANTIBODY: CPT | Performed by: INTERNAL MEDICINE

## 2017-01-01 PROCEDURE — 5A1935Z RESPIRATORY VENTILATION, LESS THAN 24 CONSECUTIVE HOURS: ICD-10-PCS | Performed by: ANESTHESIOLOGY

## 2017-01-01 PROCEDURE — 86704 HEP B CORE ANTIBODY TOTAL: CPT | Performed by: INTERNAL MEDICINE

## 2017-01-01 PROCEDURE — 85025 COMPLETE CBC W/AUTO DIFF WBC: CPT | Performed by: PHYSICIAN ASSISTANT

## 2017-01-01 PROCEDURE — 80053 COMPREHEN METABOLIC PANEL: CPT | Performed by: PHYSICIAN ASSISTANT

## 2017-01-01 PROCEDURE — 83735 ASSAY OF MAGNESIUM: CPT | Performed by: INTERNAL MEDICINE

## 2017-01-01 PROCEDURE — 76705 ECHO EXAM OF ABDOMEN: CPT

## 2017-01-01 PROCEDURE — 83690 ASSAY OF LIPASE: CPT | Performed by: INTERNAL MEDICINE

## 2017-01-01 PROCEDURE — 02H633Z INSERTION OF INFUSION DEVICE INTO RIGHT ATRIUM, PERCUTANEOUS APPROACH: ICD-10-PCS | Performed by: ANESTHESIOLOGY

## 2017-01-01 PROCEDURE — 36415 COLL VENOUS BLD VENIPUNCTURE: CPT | Performed by: PHYSICIAN ASSISTANT

## 2017-01-01 PROCEDURE — 80074 ACUTE HEPATITIS PANEL: CPT | Performed by: PHYSICIAN ASSISTANT

## 2017-01-01 PROCEDURE — 94761 N-INVAS EAR/PLS OXIMETRY MLT: CPT

## 2017-01-01 PROCEDURE — 85730 THROMBOPLASTIN TIME PARTIAL: CPT | Performed by: EMERGENCY MEDICINE

## 2017-01-01 PROCEDURE — 77030018846 HC SOL IRR STRL H20 ICUM -A

## 2017-01-01 PROCEDURE — 97161 PT EVAL LOW COMPLEX 20 MIN: CPT

## 2017-01-01 PROCEDURE — 74020 XR ABD (AP AND ERECT OR DECUB): CPT

## 2017-01-01 PROCEDURE — 74011250636 HC RX REV CODE- 250/636

## 2017-01-01 PROCEDURE — 74011000250 HC RX REV CODE- 250: Performed by: INTERNAL MEDICINE

## 2017-01-01 PROCEDURE — 87340 HEPATITIS B SURFACE AG IA: CPT | Performed by: INTERNAL MEDICINE

## 2017-01-01 PROCEDURE — 74011250637 HC RX REV CODE- 250/637: Performed by: HOSPITALIST

## 2017-01-01 PROCEDURE — 87516 HEPATITIS B DNA AMP PROBE: CPT

## 2017-01-01 PROCEDURE — 94002 VENT MGMT INPAT INIT DAY: CPT

## 2017-01-01 PROCEDURE — A9577 INJ MULTIHANCE: HCPCS | Performed by: INTERNAL MEDICINE

## 2017-01-01 PROCEDURE — 82248 BILIRUBIN DIRECT: CPT | Performed by: INTERNAL MEDICINE

## 2017-01-01 PROCEDURE — 87536 HIV-1 QUANT&REVRSE TRNSCRPJ: CPT | Performed by: INTERNAL MEDICINE

## 2017-01-01 PROCEDURE — 84443 ASSAY THYROID STIM HORMONE: CPT | Performed by: INTERNAL MEDICINE

## 2017-01-01 PROCEDURE — 83605 ASSAY OF LACTIC ACID: CPT | Performed by: EMERGENCY MEDICINE

## 2017-01-01 PROCEDURE — 74011250636 HC RX REV CODE- 250/636: Performed by: ANESTHESIOLOGY

## 2017-01-01 PROCEDURE — 86317 IMMUNOASSAY INFECTIOUS AGENT: CPT

## 2017-01-01 RX ORDER — SODIUM CHLORIDE 9 MG/ML
75 INJECTION, SOLUTION INTRAVENOUS CONTINUOUS
Status: DISCONTINUED | OUTPATIENT
Start: 2017-01-01 | End: 2017-01-01 | Stop reason: HOSPADM

## 2017-01-01 RX ORDER — FACIAL-BODY WIPES
10 EACH TOPICAL DAILY PRN
Status: DISCONTINUED | OUTPATIENT
Start: 2017-01-01 | End: 2017-01-01 | Stop reason: HOSPADM

## 2017-01-01 RX ORDER — NADOLOL 20 MG/1
20 TABLET ORAL DAILY
Qty: 90 TAB | Refills: 2 | Status: SHIPPED | OUTPATIENT
Start: 2017-01-01 | End: 2017-01-01

## 2017-01-01 RX ORDER — LANOLIN ALCOHOL/MO/W.PET/CERES
3 CREAM (GRAM) TOPICAL
Status: DISCONTINUED | OUTPATIENT
Start: 2017-01-01 | End: 2017-01-01 | Stop reason: HOSPADM

## 2017-01-01 RX ORDER — EMTRICITABINE AND TENOFOVIR DISOPROXIL FUMARATE 200; 300 MG/1; MG/1
1 TABLET, FILM COATED ORAL DAILY
Status: DISCONTINUED | OUTPATIENT
Start: 2017-01-01 | End: 2017-01-01 | Stop reason: HOSPADM

## 2017-01-01 RX ORDER — MORPHINE SULFATE 4 MG/ML
0.5 INJECTION, SOLUTION INTRAMUSCULAR; INTRAVENOUS
Status: DISCONTINUED | OUTPATIENT
Start: 2017-01-01 | End: 2017-01-01 | Stop reason: HOSPADM

## 2017-01-01 RX ORDER — MORPHINE SULFATE 4 MG/ML
0.5 INJECTION, SOLUTION INTRAMUSCULAR; INTRAVENOUS
Status: DISCONTINUED | OUTPATIENT
Start: 2017-01-01 | End: 2017-01-01

## 2017-01-01 RX ORDER — CHLORHEXIDINE GLUCONATE 1.2 MG/ML
15 RINSE ORAL EVERY 12 HOURS
Status: DISCONTINUED | OUTPATIENT
Start: 2017-01-01 | End: 2017-01-01 | Stop reason: HOSPADM

## 2017-01-01 RX ORDER — VENLAFAXINE HYDROCHLORIDE 75 MG/1
75 CAPSULE, EXTENDED RELEASE ORAL DAILY
COMMUNITY

## 2017-01-01 RX ORDER — EMTRICITABINE AND TENOFOVIR DISOPROXIL FUMARATE 200; 300 MG/1; MG/1
1 TABLET, FILM COATED ORAL DAILY
Qty: 10 TAB | Refills: 0 | Status: SHIPPED
Start: 2017-01-01 | End: 2017-01-01

## 2017-01-01 RX ORDER — NADOLOL 20 MG/1
20 TABLET ORAL DAILY
COMMUNITY
End: 2017-01-01

## 2017-01-01 RX ORDER — MORPHINE SULFATE 4 MG/ML
2 INJECTION, SOLUTION INTRAMUSCULAR; INTRAVENOUS
Status: DISCONTINUED | OUTPATIENT
Start: 2017-01-01 | End: 2017-01-01

## 2017-01-01 RX ORDER — LEVOFLOXACIN 5 MG/ML
750 INJECTION, SOLUTION INTRAVENOUS
Status: DISCONTINUED | OUTPATIENT
Start: 2017-07-18 | End: 2017-01-01 | Stop reason: HOSPADM

## 2017-01-01 RX ORDER — VENLAFAXINE HYDROCHLORIDE 75 MG/1
75 CAPSULE, EXTENDED RELEASE ORAL DAILY
Qty: 30 CAP | Refills: 3 | Status: SHIPPED | OUTPATIENT
Start: 2017-01-01 | End: 2017-01-01 | Stop reason: SDUPTHER

## 2017-01-01 RX ORDER — OXYCODONE HYDROCHLORIDE 5 MG/1
5 TABLET ORAL
Status: DISCONTINUED | OUTPATIENT
Start: 2017-01-01 | End: 2017-01-01 | Stop reason: HOSPADM

## 2017-01-01 RX ORDER — MUPIROCIN 20 MG/G
OINTMENT TOPICAL 2 TIMES DAILY
Status: DISCONTINUED | OUTPATIENT
Start: 2017-01-01 | End: 2017-01-01 | Stop reason: HOSPADM

## 2017-01-01 RX ORDER — SODIUM BICARBONATE 1 MEQ/ML
SYRINGE (ML) INTRAVENOUS
Status: COMPLETED
Start: 2017-01-01 | End: 2017-01-01

## 2017-01-01 RX ORDER — NOREPINEPHRINE BITARTRATE 1 MG/ML
INJECTION, SOLUTION INTRAVENOUS
Status: COMPLETED
Start: 2017-01-01 | End: 2017-01-01

## 2017-01-01 RX ORDER — PHENYLEPHRINE HYDROCHLORIDE 10 MG/ML
INJECTION INTRAVENOUS
Status: COMPLETED
Start: 2017-01-01 | End: 2017-01-01

## 2017-01-01 RX ORDER — ACETAMINOPHEN 325 MG/1
650 TABLET ORAL
Status: DISCONTINUED | OUTPATIENT
Start: 2017-01-01 | End: 2017-01-01

## 2017-01-01 RX ORDER — PROPOFOL 10 MG/ML
INJECTION, EMULSION INTRAVENOUS
Status: DISCONTINUED
Start: 2017-01-01 | End: 2017-01-01 | Stop reason: HOSPADM

## 2017-01-01 RX ORDER — NALOXONE HYDROCHLORIDE 0.4 MG/ML
0.4 INJECTION, SOLUTION INTRAMUSCULAR; INTRAVENOUS; SUBCUTANEOUS AS NEEDED
Status: DISCONTINUED | OUTPATIENT
Start: 2017-01-01 | End: 2017-01-01 | Stop reason: HOSPADM

## 2017-01-01 RX ORDER — SODIUM CHLORIDE 0.9 % (FLUSH) 0.9 %
5-10 SYRINGE (ML) INJECTION EVERY 8 HOURS
Status: DISCONTINUED | OUTPATIENT
Start: 2017-01-01 | End: 2017-01-01 | Stop reason: HOSPADM

## 2017-01-01 RX ORDER — PROPOFOL 10 MG/ML
40 INJECTION, EMULSION INTRAVENOUS
Status: COMPLETED | OUTPATIENT
Start: 2017-01-01 | End: 2017-01-01

## 2017-01-01 RX ORDER — SODIUM CHLORIDE 0.9 % (FLUSH) 0.9 %
5-10 SYRINGE (ML) INJECTION AS NEEDED
Status: DISCONTINUED | OUTPATIENT
Start: 2017-01-01 | End: 2017-01-01 | Stop reason: HOSPADM

## 2017-01-01 RX ORDER — OXYCODONE HYDROCHLORIDE 5 MG/1
5 TABLET ORAL
COMMUNITY

## 2017-01-01 RX ORDER — SUCCINYLCHOLINE CHLORIDE 20 MG/ML
INJECTION INTRAMUSCULAR; INTRAVENOUS
Status: DISCONTINUED
Start: 2017-01-01 | End: 2017-01-01 | Stop reason: HOSPADM

## 2017-01-01 RX ORDER — DOPAMINE HYDROCHLORIDE 320 MG/100ML
0-20 INJECTION, SOLUTION INTRAVENOUS
Status: DISCONTINUED | OUTPATIENT
Start: 2017-01-01 | End: 2017-01-01 | Stop reason: HOSPADM

## 2017-01-01 RX ORDER — VENLAFAXINE HYDROCHLORIDE 75 MG/1
75 CAPSULE, EXTENDED RELEASE ORAL DAILY
Qty: 30 CAP | Refills: 3 | Status: SHIPPED | OUTPATIENT
Start: 2017-01-01 | End: 2017-01-01

## 2017-01-01 RX ORDER — PROPRANOLOL HYDROCHLORIDE 10 MG/1
10 TABLET ORAL 2 TIMES DAILY
Status: DISCONTINUED | OUTPATIENT
Start: 2017-01-01 | End: 2017-01-01

## 2017-01-01 RX ORDER — MORPHINE SULFATE 2 MG/ML
0.5 INJECTION, SOLUTION INTRAMUSCULAR; INTRAVENOUS
Status: DISCONTINUED | OUTPATIENT
Start: 2017-01-01 | End: 2017-01-01

## 2017-01-01 RX ORDER — SODIUM CHLORIDE 9 MG/ML
75 INJECTION, SOLUTION INTRAVENOUS CONTINUOUS
Status: DISCONTINUED | OUTPATIENT
Start: 2017-01-01 | End: 2017-01-01

## 2017-01-01 RX ORDER — THERA TABS 400 MCG
1 TAB ORAL DAILY
Status: DISCONTINUED | OUTPATIENT
Start: 2017-01-01 | End: 2017-01-01 | Stop reason: HOSPADM

## 2017-01-01 RX ORDER — PANTOPRAZOLE SODIUM 40 MG/1
40 TABLET, DELAYED RELEASE ORAL
Status: DISCONTINUED | OUTPATIENT
Start: 2017-01-01 | End: 2017-01-01 | Stop reason: HOSPADM

## 2017-01-01 RX ORDER — SODIUM CHLORIDE 9 MG/ML
50 INJECTION, SOLUTION INTRAVENOUS
Status: COMPLETED | OUTPATIENT
Start: 2017-01-01 | End: 2017-01-01

## 2017-01-01 RX ORDER — ALBUMIN HUMAN 250 G/1000ML
12.5 SOLUTION INTRAVENOUS ONCE
Status: COMPLETED | OUTPATIENT
Start: 2017-01-01 | End: 2017-01-01

## 2017-01-01 RX ORDER — VENLAFAXINE HYDROCHLORIDE 75 MG/1
75 CAPSULE, EXTENDED RELEASE ORAL DAILY
Status: DISCONTINUED | OUTPATIENT
Start: 2017-01-01 | End: 2017-01-01

## 2017-01-01 RX ORDER — ENOXAPARIN SODIUM 100 MG/ML
40 INJECTION SUBCUTANEOUS EVERY 24 HOURS
Status: DISCONTINUED | OUTPATIENT
Start: 2017-01-01 | End: 2017-01-01

## 2017-01-01 RX ORDER — SODIUM CHLORIDE 0.9 % (FLUSH) 0.9 %
10 SYRINGE (ML) INJECTION
Status: COMPLETED | OUTPATIENT
Start: 2017-01-01 | End: 2017-01-01

## 2017-01-01 RX ORDER — ONDANSETRON 2 MG/ML
4 INJECTION INTRAMUSCULAR; INTRAVENOUS
Status: DISCONTINUED | OUTPATIENT
Start: 2017-01-01 | End: 2017-01-01 | Stop reason: HOSPADM

## 2017-01-01 RX ORDER — LEVOFLOXACIN 5 MG/ML
750 INJECTION, SOLUTION INTRAVENOUS EVERY 24 HOURS
Status: DISCONTINUED | OUTPATIENT
Start: 2017-01-01 | End: 2017-01-01

## 2017-01-01 RX ORDER — PHENYLEPHRINE HYDROCHLORIDE 10 MG/ML
INJECTION INTRAVENOUS
Status: DISCONTINUED
Start: 2017-01-01 | End: 2017-01-01 | Stop reason: HOSPADM

## 2017-01-01 RX ORDER — SODIUM BICARBONATE 1 MEQ/ML
100 SYRINGE (ML) INTRAVENOUS ONCE
Status: COMPLETED | OUTPATIENT
Start: 2017-01-01 | End: 2017-01-01

## 2017-01-01 RX ADMIN — ALBUMIN (HUMAN) 12.5 G: 0.25 INJECTION, SOLUTION INTRAVENOUS at 06:01

## 2017-01-01 RX ADMIN — RALTEGRAVIR 400 MG: 400 TABLET, FILM COATED ORAL at 08:57

## 2017-01-01 RX ADMIN — SODIUM BICARBONATE: 84 INJECTION, SOLUTION INTRAVENOUS at 07:00

## 2017-01-01 RX ADMIN — PHENYLEPHRINE HYDROCHLORIDE 300 MCG/MIN: 10 INJECTION INTRAVENOUS at 13:22

## 2017-01-01 RX ADMIN — PHYTONADIONE 5 MG: 10 INJECTION, EMULSION INTRAMUSCULAR; INTRAVENOUS; SUBCUTANEOUS at 21:00

## 2017-01-01 RX ADMIN — Medication 10 ML: at 22:31

## 2017-01-01 RX ADMIN — NOREPINEPHRINE BITARTRATE 200 MCG/MIN: 1 INJECTION, SOLUTION, CONCENTRATE INTRAVENOUS at 08:09

## 2017-01-01 RX ADMIN — Medication 10 ML: at 05:24

## 2017-01-01 RX ADMIN — MUPIROCIN: 20 OINTMENT TOPICAL at 10:23

## 2017-01-01 RX ADMIN — RILPIVIRINE HYDROCHLORIDE 25 MG: 25 TABLET, FILM COATED ORAL at 18:43

## 2017-01-01 RX ADMIN — ACETYLCYSTEINE 3140 MG: 200 INJECTION, SOLUTION INTRAVENOUS at 18:46

## 2017-01-01 RX ADMIN — Medication 10 ML: at 20:35

## 2017-01-01 RX ADMIN — Medication 10 ML: at 20:59

## 2017-01-01 RX ADMIN — THERA TABS 1 TABLET: TAB at 09:27

## 2017-01-01 RX ADMIN — RILPIVIRINE HYDROCHLORIDE 25 MG: 25 TABLET, FILM COATED ORAL at 18:51

## 2017-01-01 RX ADMIN — PANTOPRAZOLE SODIUM 40 MG: 40 TABLET, DELAYED RELEASE ORAL at 08:19

## 2017-01-01 RX ADMIN — RILPIVIRINE HYDROCHLORIDE 25 MG: 25 TABLET, FILM COATED ORAL at 17:11

## 2017-01-01 RX ADMIN — SODIUM CHLORIDE 75 ML/HR: 900 INJECTION, SOLUTION INTRAVENOUS at 11:49

## 2017-01-01 RX ADMIN — EMTRICITABINE AND TENOFOVIR DISOPROXIL FUMARATE 1 TABLET: 200; 300 TABLET, FILM COATED ORAL at 10:02

## 2017-01-01 RX ADMIN — EMTRICITABINE AND TENOFOVIR DISOPROXIL FUMARATE 1 TABLET: 200; 300 TABLET, FILM COATED ORAL at 09:26

## 2017-01-01 RX ADMIN — MELATONIN 3 MG ORAL TABLET 3 MG: 3 TABLET ORAL at 22:44

## 2017-01-01 RX ADMIN — RALTEGRAVIR 400 MG: 400 TABLET, FILM COATED ORAL at 11:31

## 2017-01-01 RX ADMIN — ACETYLCYSTEINE 6300 MG: 200 INJECTION, SOLUTION INTRAVENOUS at 14:49

## 2017-01-01 RX ADMIN — ONDANSETRON 4 MG: 2 INJECTION INTRAMUSCULAR; INTRAVENOUS at 08:51

## 2017-01-01 RX ADMIN — Medication 10 ML: at 06:24

## 2017-01-01 RX ADMIN — SODIUM CHLORIDE 75 ML/HR: 900 INJECTION, SOLUTION INTRAVENOUS at 10:38

## 2017-01-01 RX ADMIN — PHENYLEPHRINE HYDROCHLORIDE 300 MCG/MIN: 10 INJECTION INTRAVENOUS at 09:45

## 2017-01-01 RX ADMIN — PROMETHAZINE HYDROCHLORIDE 25 MG: 25 INJECTION, SOLUTION INTRAMUSCULAR; INTRAVENOUS at 02:56

## 2017-01-01 RX ADMIN — PHYTONADIONE 5 MG: 10 INJECTION, EMULSION INTRAMUSCULAR; INTRAVENOUS; SUBCUTANEOUS at 10:08

## 2017-01-01 RX ADMIN — THERA TABS 1 TABLET: TAB at 08:45

## 2017-01-01 RX ADMIN — ONDANSETRON 4 MG: 2 INJECTION INTRAMUSCULAR; INTRAVENOUS at 21:31

## 2017-01-01 RX ADMIN — EMTRICITABINE AND TENOFOVIR DISOPROXIL FUMARATE 1 TABLET: 200; 300 TABLET, FILM COATED ORAL at 10:08

## 2017-01-01 RX ADMIN — Medication 10 ML: at 06:11

## 2017-01-01 RX ADMIN — EMTRICITABINE AND TENOFOVIR DISOPROXIL FUMARATE 1 TABLET: 200; 300 TABLET, FILM COATED ORAL at 22:33

## 2017-01-01 RX ADMIN — Medication 10 ML: at 13:04

## 2017-01-01 RX ADMIN — SODIUM CHLORIDE 50 ML/HR: 900 INJECTION, SOLUTION INTRAVENOUS at 04:31

## 2017-01-01 RX ADMIN — NOREPINEPHRINE BITARTRATE 200 MCG/MIN: 1 INJECTION, SOLUTION, CONCENTRATE INTRAVENOUS at 12:34

## 2017-01-01 RX ADMIN — Medication 10 ML: at 05:07

## 2017-01-01 RX ADMIN — PHYTONADIONE 5 MG: 10 INJECTION, EMULSION INTRAMUSCULAR; INTRAVENOUS; SUBCUTANEOUS at 11:11

## 2017-01-01 RX ADMIN — PHENYLEPHRINE HYDROCHLORIDE 300 MCG/MIN: 10 INJECTION INTRAVENOUS at 11:38

## 2017-01-01 RX ADMIN — RALTEGRAVIR 400 MG: 400 TABLET, FILM COATED ORAL at 10:07

## 2017-01-01 RX ADMIN — OXYCODONE HYDROCHLORIDE 5 MG: 5 TABLET ORAL at 16:12

## 2017-01-01 RX ADMIN — ACETYLCYSTEINE 6300 MG: 200 INJECTION, SOLUTION INTRAVENOUS at 04:22

## 2017-01-01 RX ADMIN — MELATONIN 3 MG ORAL TABLET 3 MG: 3 TABLET ORAL at 23:03

## 2017-01-01 RX ADMIN — MELATONIN 3 MG ORAL TABLET 3 MG: 3 TABLET ORAL at 22:31

## 2017-01-01 RX ADMIN — THERA TABS 1 TABLET: TAB at 08:46

## 2017-01-01 RX ADMIN — DOPAMINE HYDROCHLORIDE IN DEXTROSE 5 MCG/KG/MIN: 3.2 INJECTION, SOLUTION INTRAVENOUS at 09:04

## 2017-01-01 RX ADMIN — PHENYLEPHRINE HYDROCHLORIDE 10000 MCG: 10 INJECTION INTRAVENOUS at 06:37

## 2017-01-01 RX ADMIN — PANTOPRAZOLE SODIUM 40 MG: 40 TABLET, DELAYED RELEASE ORAL at 08:28

## 2017-01-01 RX ADMIN — LEVOFLOXACIN 750 MG: 5 INJECTION, SOLUTION INTRAVENOUS at 07:45

## 2017-01-01 RX ADMIN — ONDANSETRON 4 MG: 2 INJECTION INTRAMUSCULAR; INTRAVENOUS at 11:43

## 2017-01-01 RX ADMIN — PHYTONADIONE 10 MG: 10 INJECTION, EMULSION INTRAMUSCULAR; INTRAVENOUS; SUBCUTANEOUS at 11:33

## 2017-01-01 RX ADMIN — Medication 10 ML: at 22:44

## 2017-01-01 RX ADMIN — PANTOPRAZOLE SODIUM 40 MG: 40 TABLET, DELAYED RELEASE ORAL at 08:52

## 2017-01-01 RX ADMIN — Medication 10 ML: at 13:35

## 2017-01-01 RX ADMIN — VASOPRESSIN 0.04 UNITS/MIN: 20 INJECTION INTRAVENOUS at 07:34

## 2017-01-01 RX ADMIN — SODIUM CHLORIDE 75 ML/HR: 900 INJECTION, SOLUTION INTRAVENOUS at 20:59

## 2017-01-01 RX ADMIN — ONDANSETRON 4 MG: 2 INJECTION INTRAMUSCULAR; INTRAVENOUS at 09:26

## 2017-01-01 RX ADMIN — PROPRANOLOL HYDROCHLORIDE 10 MG: 10 TABLET ORAL at 18:48

## 2017-01-01 RX ADMIN — PIPERACILLIN SODIUM,TAZOBACTAM SODIUM 3.38 G: 3; .375 INJECTION, POWDER, FOR SOLUTION INTRAVENOUS at 09:38

## 2017-01-01 RX ADMIN — EMTRICITABINE AND TENOFOVIR DISOPROXIL FUMARATE 1 TABLET: 200; 300 TABLET, FILM COATED ORAL at 08:19

## 2017-01-01 RX ADMIN — RALTEGRAVIR 400 MG: 400 TABLET, FILM COATED ORAL at 12:44

## 2017-01-01 RX ADMIN — PANTOPRAZOLE SODIUM 40 MG: 40 TABLET, DELAYED RELEASE ORAL at 08:58

## 2017-01-01 RX ADMIN — PROPRANOLOL HYDROCHLORIDE 10 MG: 10 TABLET ORAL at 21:03

## 2017-01-01 RX ADMIN — RALTEGRAVIR 400 MG: 400 TABLET, FILM COATED ORAL at 20:19

## 2017-01-01 RX ADMIN — ACETYLCYSTEINE 6300 MG: 200 INJECTION, SOLUTION INTRAVENOUS at 23:33

## 2017-01-01 RX ADMIN — PHYTONADIONE 5 MG: 10 INJECTION, EMULSION INTRAMUSCULAR; INTRAVENOUS; SUBCUTANEOUS at 21:03

## 2017-01-01 RX ADMIN — RALTEGRAVIR 400 MG: 400 TABLET, FILM COATED ORAL at 13:20

## 2017-01-01 RX ADMIN — RILPIVIRINE HYDROCHLORIDE 25 MG: 25 TABLET, FILM COATED ORAL at 13:12

## 2017-01-01 RX ADMIN — ACETYLCYSTEINE 6300 MG: 200 INJECTION, SOLUTION INTRAVENOUS at 08:45

## 2017-01-01 RX ADMIN — PHENYLEPHRINE HYDROCHLORIDE 300 MCG/MIN: 10 INJECTION INTRAVENOUS at 08:00

## 2017-01-01 RX ADMIN — THERA TABS 1 TABLET: TAB at 10:02

## 2017-01-01 RX ADMIN — EMTRICITABINE AND TENOFOVIR DISOPROXIL FUMARATE 1 TABLET: 200; 300 TABLET, FILM COATED ORAL at 08:58

## 2017-01-01 RX ADMIN — RALTEGRAVIR 400 MG: 400 TABLET, FILM COATED ORAL at 22:32

## 2017-01-01 RX ADMIN — VASOPRESSIN 0.04 UNITS/MIN: 20 INJECTION INTRAVENOUS at 11:14

## 2017-01-01 RX ADMIN — IOPAMIDOL 100 ML: 755 INJECTION, SOLUTION INTRAVENOUS at 04:31

## 2017-01-01 RX ADMIN — RALTEGRAVIR 400 MG: 400 TABLET, FILM COATED ORAL at 17:31

## 2017-01-01 RX ADMIN — PHYTONADIONE 10 MG: 10 INJECTION, EMULSION INTRAMUSCULAR; INTRAVENOUS; SUBCUTANEOUS at 10:03

## 2017-01-01 RX ADMIN — Medication 3 MG: at 21:27

## 2017-01-01 RX ADMIN — RALTEGRAVIR 400 MG: 400 TABLET, FILM COATED ORAL at 17:53

## 2017-01-01 RX ADMIN — RALTEGRAVIR 400 MG: 400 TABLET, FILM COATED ORAL at 21:27

## 2017-01-01 RX ADMIN — GADOBENATE DIMEGLUMINE 12 ML: 529 INJECTION, SOLUTION INTRAVENOUS at 18:24

## 2017-01-01 RX ADMIN — RALTEGRAVIR 400 MG: 400 TABLET, FILM COATED ORAL at 17:38

## 2017-01-01 RX ADMIN — CHLORHEXIDINE GLUCONATE 15 ML: 1.2 RINSE ORAL at 10:24

## 2017-01-01 RX ADMIN — PROPOFOL 40 MG: 10 INJECTION, EMULSION INTRAVENOUS at 07:20

## 2017-01-01 RX ADMIN — SODIUM CHLORIDE 75 ML/HR: 900 INJECTION, SOLUTION INTRAVENOUS at 02:56

## 2017-01-01 RX ADMIN — EMTRICITABINE AND TENOFOVIR DISOPROXIL FUMARATE 1 TABLET: 200; 300 TABLET, FILM COATED ORAL at 10:07

## 2017-01-01 RX ADMIN — NOREPINEPHRINE BITARTRATE 200 MCG/MIN: 1 INJECTION, SOLUTION, CONCENTRATE INTRAVENOUS at 11:12

## 2017-01-01 RX ADMIN — SODIUM CHLORIDE 500 ML: 900 INJECTION, SOLUTION INTRAVENOUS at 21:04

## 2017-01-01 RX ADMIN — EMTRICITABINE AND TENOFOVIR DISOPROXIL FUMARATE 1 TABLET: 200; 300 TABLET, FILM COATED ORAL at 08:28

## 2017-01-01 RX ADMIN — ACETYLCYSTEINE 6300 MG: 200 INJECTION, SOLUTION INTRAVENOUS at 23:17

## 2017-01-01 RX ADMIN — SODIUM BICARBONATE 100 MEQ: 84 INJECTION, SOLUTION INTRAVENOUS at 10:35

## 2017-01-01 RX ADMIN — PHENYLEPHRINE HYDROCHLORIDE 300 MCG/KG/MIN: 10 INJECTION INTRAVENOUS at 06:00

## 2017-01-01 RX ADMIN — NOREPINEPHRINE BITARTRATE 8000 MCG: 1 INJECTION INTRAVENOUS at 06:36

## 2017-01-01 RX ADMIN — ACETYLCYSTEINE 9440 MG: 200 INJECTION, SOLUTION INTRAVENOUS at 17:46

## 2017-01-01 RX ADMIN — Medication 10 ML: at 13:06

## 2017-01-01 RX ADMIN — EMTRICITABINE AND TENOFOVIR DISOPROXIL FUMARATE 1 TABLET: 200; 300 TABLET, FILM COATED ORAL at 08:45

## 2017-01-01 RX ADMIN — ACETYLCYSTEINE 6300 MG: 200 INJECTION, SOLUTION INTRAVENOUS at 07:14

## 2017-01-01 RX ADMIN — Medication 10 ML: at 04:31

## 2017-01-01 RX ADMIN — THERA TABS 1 TABLET: TAB at 08:57

## 2017-01-20 NOTE — TELEPHONE ENCOUNTER
Pt called and states that he is needing a call back in regards to getting a referral to see a liver specialist and also about if he can be prescribed anti depressant medication. Please call pt to advise.

## 2017-01-20 NOTE — TELEPHONE ENCOUNTER
I called and spoke with patient. Pt scheduled for appointment to discuss antidepressants. He states that he will get information for liver specialist when he comes in for his appointment on Monday with Dr Irvin Hashimoto.

## 2017-01-23 NOTE — MR AVS SNAPSHOT
Visit Information Date & Time Provider Department Dept. Phone Encounter #  
 1/23/2017 10:15 AM Earl Bocanegra, [address] [facility] [phone] [id number] Follow-up Instructions Return in about 4 months (around 5/23/2017), or if symptoms worsen or fail to improve. Your Appointments 1/27/2017  2:00 PM  
ROUTINE CARE with Brett Baron III, DO [facility] Appt Note: depression medication [facility] [address] [id number]  
[address] [facility]  [address] Upcoming Health Maintenance Date Due Hepatitis C Screening 1965 FOBT Q 1 YEAR AGE 50-75 5/19/2013 Pneumococcal 19-64 Highest Risk (3 of 3 - PCV13) 8/8/2014 DTaP/Tdap/Td series (2 - Td) 8/8/2021 Allergies as of 1/23/2017  Review Complete On: 1/23/2017 By: Earl Bocanegra MD  
 No Known Allergies Current Immunizations  Reviewed on 1/23/2017 Name Date Influenza Vaccine 9/20/2016, 11/5/2015, 11/3/2014, 10/16/2013 Influenza Vaccine Whole 11/15/2011 PPD 3/30/2009 Pneumococcal Polysaccharide (PPSV-23) 8/8/2013 Pneumococcal Vaccine (Unspecified Type) 10/9/2008 TDAP Vaccine 8/8/2011 Reviewed by Earl Bocanegra MD on 1/23/2017 at 10:20 AM  
You Were Diagnosed With   
  
 Codes Comments Human immunodeficiency virus (HIV) disease ([facility] 75.)    -  Primary ICD-10-CM: B20 
ICD-9-CM: 743 Cough     ICD-10-CM: R05 ICD-9-CM: 170. 2 Vitals BP Pulse Temp Resp Height(growth percentile) Weight(growth percentile) 103/70 (BP 1 Location: Left arm, BP Patient Position: Sitting) 70 97.6 °F (36.4 °C) (Oral) 20 5' 11\" (1.803 m) 146 lb 6.4 oz (66.4 kg) SpO2 BMI Smoking Status 98% 20.42 kg/m2 Current Every Day Smoker Vitals History BMI and BSA Data Body Mass Index Body Surface Area  
 20.42 kg/m 2 1.82 m 2 Preferred Pharmacy Pharmacy Name Phone [facility] #[id number] Felix, [address] [facility] [phone] Your Updated Medication List  
  
   
This list is accurate as of: 1/23/17 10:28 AM.  Always use your most recent med list.  
  
  
  
  
 hxbboxqqtq-lbtgmfdv-eegbwz ala 200-25-25 mg Tab Commonly known as:  ODEFSEY Take 1 Tab by mouth daily. glucosamine 1,000 mg Tab Take  by mouth nightly. MULTIVITAMIN PO Take 1 Tab by mouth daily. nadolol 20 mg tablet Commonly known as:  CORGARD Take 1 Tab by mouth daily. oxyCODONE-acetaminophen 5-325 mg per tablet Commonly known as:  PERCOCET Take 1 Tab by mouth every four (4) hours as needed for Pain. Max Daily Amount: 6 Tabs. Indications: PAIN  
  
 ZANTAC 150 mg tablet Generic drug:  raNITIdine Take 150 mg by mouth as needed. We Performed the Following HIV-1 RNA QT BY PCR [80242 CPT(R)] LYMPHOCYTES, CD4 PERCENT AND ABSOLUTE [73457 CPT(R)] METABOLIC PANEL, COMPREHENSIVE [75652 CPT(R)] Follow-up Instructions Return in about 4 months (around 5/23/2017), or if symptoms worsen or fail to improve. To-Do List   
 01/23/2017 Imaging:  XR CHEST PA LAT Introducing [facility]! Dear Deyanira Quinn: 
Thank you for requesting a [employer] account. Our records indicate that you already have an active [employer] account. You can access your account anytime at [email or web address] Did you know that you can access your hospital and ER discharge instructions at any time in [employer]? You can also review all of your test results from your hospital stay or ER visit. Additional Information If you have questions, please visit the Frequently Asked Questions section of the [employer] website at [email or web address] Remember, [employer] is NOT to be used for urgent needs. For medical emergencies, dial 911. Now available from your iPhone and Android! Please provide this summary of care documentation to your next provider. Your primary care clinician is listed as Isiah Foy. If you have any questions after today's visit, please call [phone].

## 2017-01-23 NOTE — PROGRESS NOTES
IVONNE Lizama is a 46 y.o., male and is here today for follow up of HIV infection. The patient has been 100% compliant with HAART. Last CD4 count and Viral Load were 993 (29.2%) and less than 20 cps/ml on 9/20/16. Current HAART is comprised of [address]. CMP normal on that date as well. His PCP is Dr. Luis Felipe Villalba.    Allergy history, medication list, past medical history, and social history were all reviewed. No changes were made in any of these. Up to date on all immunizations with the exception of Pneumovax booster. His chronic hepatitis B infection has been quiescent with last LFTs normal and hepatitis B viral load undetectable on TDF. He was diagnosed with cirrhosis in the remote past and is on disability for this, which may run out this year. He has not seen a hepatologist since arrival in [place], and has been referred to the [facility] liver clinic but has not yet made an appointment. Recent surgery per Dr. Lauren Haider for carcinoma in situ with good recovery. He has regular follow up with Dr. Lauren Haider.     Allergy history, medication list, past medical history, and social history were all reviewed. No changes were made in any of these.       ROS Persistent nonproductive cough in a chronic smoker. Needs CXR, and probably CT.  + chronic dizziness, balance problems,and sleeping problems unchanged. No fever, sweats, chills, nausea, vomiting, diarrhea, or rash. Weight has been stable. Level of energy is good. No changes in vision. No headaches or cognitive impairment. No medications have been added to the regimen since last visit. No signs of peripheral neuropathy. Review of systems otherwise negative with greater than 10 systems reviewed    Physical Exam  Vital signs and weight are stable. EOMI. Sclera anicteric. No thrush or leukoplakia. Lungs clear to A&P. Regular rhythm without murmur. Abdomen without organomegaly, mass, or tenderness. Bowel sounds normal. No joint abnormalities or edema. No lymphadenopathy. Neurologic exam is nonfocal.      ASSESSMENT and PLAN  #1: HIV infection. HIV is clinically and virologically stable. CD4 count and viral load will be ordered today. Patient will call back for lab work in 2 weeks and will return in 4 months for follow up. Tolerating switch to [facility] well. #2: Hepatitis B infection. In remission on TDF. Recheck LFTs and hepatitis B viral load today. History cirrhosis, will make appt with liver institute. #3: Chronic tobacco abuse./cough. Check CXR. #4: Anorectal carcinoma in situ. Continue follow up with Dr. Jamison Menjivar. Pneumovax given today.

## 2017-01-27 PROBLEM — F32.A DEPRESSION: Chronic | Status: ACTIVE | Noted: 2017-01-01

## 2017-01-27 NOTE — PROGRESS NOTES
Reviewed record in preparation for visit and have obtained necessary documentation. Identified pt with two pt identifiers(name and ). Chief Complaint   Patient presents with    Depression     wants to discuss medication       Health Maintenance Due   Topic Date Due    Hepatitis C Screening  1965    FOBT Q 1 YEAR AGE 50-75  2013       Mr. Tanya Siddiqi has a reminder for a \"due or due soon\" health maintenance. I have asked that he discuss health maintenance topic(s) due with His  primary care provider. Coordination of Care Questionnaire:  :     1) Have you been to an emergency room, urgent care clinic since your last visit? no   Hospitalized since your last visit? no             2) Have you seen or consulted any other health care providers outside of [facility] since your last visit? no  (Include any pap smears or colon screenings in this section.)      Patient is accompanied by self I have received verbal consent from Ayo Soriano to discuss any/all medical information while they are present in the room.

## 2017-01-27 NOTE — PATIENT INSTRUCTIONS
Recovering From Depression: Care Instructions  Your Care Instructions  Taking good care of yourself is important as you recover from depression. In time, your symptoms will fade as your treatment takes hold. Do not give up. Instead, focus your energy on getting better. Your mood will improve. It just takes some time. Focus on things that can help you feel better, such as being with friends and family, eating well, and getting enough rest. But take things slowly. Do not do too much too soon. You will begin to feel better gradually. Follow-up care is a key part of your treatment and safety. Be sure to make and go to all appointments, and call your doctor if you are having problems. It's also a good idea to know your test results and keep a list of the medicines you take. How can you care for yourself at home? Be realistic  · If you have a large task to do, break it up into smaller steps you can handle, and just do what you can. · You may want to put off important decisions until your depression has lifted. If you have plans that will have a major impact on your life, such as marriage, divorce, or a job change, try to wait a bit. Talk it over with friends and loved ones who can help you look at the overall picture first.  · Reaching out to people for help is important. Do not isolate yourself. Let your family and friends help you. Find someone you can trust and confide in, and talk to that person. · Be patient, and be kind to yourself. Remember that depression is not your fault and is not something you can overcome with willpower alone. Treatment is necessary for depression, just like for any other illness. Feeling better takes time, and your mood will improve little by little. Stay active  · Stay busy and get outside. Take a walk, or try some other light exercise. · Talk with your doctor about an exercise program. Exercise can help with mild depression. · Go to a movie or concert.  Take part in a Buddhist activity or other social gathering. Go to a [employer] game. · Ask a friend to have dinner with you. Take care of yourself  · Eat a balanced diet with plenty of fresh fruits and vegetables, whole grains, and lean protein. If you have lost your appetite, eat small snacks rather than large meals. · Avoid drinking alcohol or using illegal drugs. Do not take medicines that have not been prescribed for you. They may interfere with medicines you may be taking for depression, or they may make your depression worse. · Take your medicines exactly as they are prescribed. You may start to feel better within 1 to 3 weeks of taking antidepressant medicine. But it can take as many as 6 to 8 weeks to see more improvement. If you have questions or concerns about your medicines, or if you do not notice any improvement by 3 weeks, talk to your doctor. · If you have any side effects from your medicine, tell your doctor. Antidepressants can make you feel tired, dizzy, or nervous. Some people have dry mouth, constipation, headaches, sexual problems, or diarrhea. Many of these side effects are mild and will go away on their own after you have been taking the medicine for a few weeks. Some may last longer. Talk to your doctor if side effects are bothering you too much. You might be able to try a different medicine. · Get enough sleep. If you have problems sleeping:  ¨ Go to bed at the same time every night, and get up at the same time every morning. ¨ Keep your bedroom dark and quiet. ¨ Do not exercise after 5:00 p.m. ¨ Avoid drinks with caffeine after 5:00 p.m. · Avoid sleeping pills unless they are prescribed by the doctor treating your depression. Sleeping pills may make you groggy during the day, and they may interact with other medicine you are taking. · If you have any other illnesses, such as diabetes, heart disease, or high blood pressure, make sure to continue with your treatment.  Tell your doctor about all of the medicines you take, including those with or without a prescription. · Keep the numbers for these national suicide hotlines: 2-066-055-TALK (3-572.315.5324) and 2-166-MMYUOGB (0-952.289.5381). If you or someone you know talks about suicide or feeling hopeless, get help right away. When should you call for help? Call 911 anytime you think you may need emergency care. For example, call if:  · You feel like hurting yourself or someone else. · Someone you know has depression and is about to attempt or is attempting suicide. Call your doctor now or seek immediate medical care if:  · You hear voices. · Someone you know has depression and:  ¨ Starts to give away his or her possessions. ¨ Uses illegal drugs or drinks alcohol heavily. ¨ Talks or writes about death, including writing suicide notes or talking about guns, knives, or pills. ¨ Starts to spend a lot of time alone. ¨ Acts very aggressively or suddenly appears calm. Watch closely for changes in your health, and be sure to contact your doctor if:  · You do not get better as expected. Where can you learn more? Go to http://kate-mita.info/. Enter R784 in the search box to learn more about \"Recovering From Depression: Care Instructions. \"  Current as of: July 26, 2016  Content Version: 11.1  © 4490-3298 [employer]. Care instructions adapted under license by [employer] (which disclaims liability or warranty for this information). If you have questions about a medical condition or this instruction, always ask your healthcare professional. [place] [address] any warranty or liability for your use of this information. Depression Treatment: Care Instructions  Your Care Instructions  Depression is a condition that affects the way you feel, think, and act. It causes symptoms such as low energy, loss of interest in daily activities, and sadness or grouchiness that goes on for a long time.  Depression is very common and affects men and women of all ages. Depression is a medical illness caused by changes in the natural chemicals in your brain. It is not a character flaw, and it does not mean that you are a bad or weak person. It does not mean that you are going crazy. It is important to know that depression can be treated. Medicines, counseling, and self-care can all help. Many people do not get help because they are embarrassed or think that they will get over the depression on their own. But some people do not get better without treatment. Follow-up care is a key part of your treatment and safety. Be sure to make and go to all appointments, and call your doctor if you are having problems. It's also a good idea to know your test results and keep a list of the medicines you take. How can you care for yourself at home? Learn about antidepressant medicines  Antidepressant medicines can improve or end the symptoms of depression. You may need to take the medicine for at least 6 months, and often longer. Keep taking your medicine even if you feel better. If you stop taking it too soon, your symptoms may come back or get worse. You may start to feel better within 1 to 3 weeks of taking antidepressant medicine. But it can take as many as 6 to 8 weeks to see more improvement. Talk to your doctor if you have problems with your medicine or if you do not notice any improvement after 3 weeks. Antidepressants can make you feel tired, dizzy, or nervous. Some people have dry mouth, constipation, headaches, sexual problems, an upset stomach, or diarrhea. Many of these side effects are mild and go away on their own after you take the medicine for a few weeks. Some may last longer. Talk to your doctor if side effects bother you too much. You might be able to try a different medicine. If you are pregnant or breastfeeding, talk to your doctor about what medicines you can take.   Learn about counseling  In many cases, counseling can work as well as medicines to treat mild to moderate depression. Counseling is done by licensed mental health providers, such as psychologists, social workers, and some types of nurses. It can be done in one-on-one sessions or in a group setting. Many people find group sessions helpful. Cognitive-behavioral therapy is a type of counseling. In this treatment therapy, you learn how to see and change unhelpful thinking styles that may be adding to your depression. Counseling and medicines often work well when used together. To manage depression  · Be physically active. Getting 30 minutes of exercise each day is good for your body and your mind. Begin slowly if it is hard for you to get started. If you already exercise, keep it up. · Plan something pleasant for yourself every day. Include activities that you have enjoyed in the past.  · Get enough sleep. Talk to your doctor if you have problems sleeping. · Eat a balanced diet. If you do not feel hungry, eat small snacks rather than large meals. · Do not drink alcohol, use illegal drugs, or take medicines that your doctor has not prescribed for you. They may interfere with your treatment. · Spend time with family and friends. It may help to speak openly about your depression with people you trust.  · Take your medicines exactly as prescribed. Call your doctor if you think you are having a problem with your medicine. · Do not make major life decisions while you are depressed. Depression may change the way you think. You will be able to make better decisions after you feel better. · Think positively. Challenge negative thoughts with statements such as \"I am hopeful\"; \"Things will get better\"; and \"I can ask for the help I need. \" Write down these statements and read them often, even if you don't believe them yet. · Be patient with yourself. It took time for your depression to develop, and it will take time for your symptoms to improve.  Do not take on too much or be too hard on yourself. · Learn all you can about depression from written and online materials. · Check out behavioral health classes to learn more about dealing with depression. · Keep the numbers for these national suicide hotlines: 9-639-655-TALK (0-202.276.4988) and 0-697-PAIRNEF (2-898.419.6204). If you or someone you know talks about suicide or feeling hopeless, get help right away. When should you call for help? Call 911 anytime you think you may need emergency care. For example, call if:  · You feel you cannot stop from hurting yourself or someone else. Call your doctor now or seek immediate medical care if:  · You hear voices. · You feel much more depressed. Watch closely for changes in your health, and be sure to contact your doctor if:  · You are having problems with your depression medicine. · You are not getting better as expected. Where can you learn more? Go to http://kate-mita.info/. Enter H478 in the search box to learn more about \"Depression Treatment: Care Instructions. \"  Current as of: July 26, 2016  Content Version: 11.1  © 0197-9361 [employer]. Care instructions adapted under license by [facility] (which disclaims liability or warranty for this information). If you have questions about a medical condition or this instruction, always ask your healthcare professional. [place] [address] any warranty or liability for your use of this information.

## 2017-01-27 NOTE — MR AVS SNAPSHOT
Visit Information Date & Time Provider Department Dept. Phone Encounter #  
 1/27/2017  2:00 PM Ross Peguero, [address]  [id number] Follow-up Instructions Return in about 6 weeks (around 3/10/2017) for depression. Your Appointments 3/21/2017 12:00 PM  
New Patient with Maciej Ward MD  
[facility] ([address]) Appt Note: np cirrhosis referred by Dr. Goncalves Dense [address] [place] [phone] [facility] [address]  
[address] [place] [address] Upcoming Health Maintenance Date Due Hepatitis C Screening 1965 FOBT Q 1 YEAR AGE 50-75 5/19/2013 DTaP/Tdap/Td series (2 - Td) 8/8/2021 Allergies as of 1/27/2017  Review Complete On: 1/27/2017 By: Jessica Silver III, DO No Known Allergies Current Immunizations  Reviewed on 1/23/2017 Name Date Influenza Vaccine 9/20/2016, 11/5/2015, 11/3/2014, 10/16/2013 Influenza Vaccine Whole 11/15/2011 PPD 3/30/2009 Pneumococcal Polysaccharide (PPSV-23) 1/23/2017, 8/8/2013 Pneumococcal Vaccine (Unspecified Type) 10/9/2008 TDAP Vaccine 8/8/2011 Not reviewed this visit You Were Diagnosed With   
  
 Codes Comments Depression, unspecified depression type    -  Primary ICD-10-CM: F32.9 ICD-9-CM: 726 Vitals BP Pulse Temp Resp Height(growth percentile) Weight(growth percentile) 122/76 (BP 1 Location: Right arm, BP Patient Position: Sitting) 75 98.3 °F (36.8 °C) (Oral) 16 5' 11\" (1.803 m) 147 lb (66.7 kg) SpO2 BMI Smoking Status 95% 20.5 kg/m2 Current Every Day Smoker Vitals History BMI and BSA Data Body Mass Index Body Surface Area 20.5 kg/m 2 1.83 m 2 Preferred Pharmacy Pharmacy Name Phone [id number] [address] [phone] Your Updated Medication List  
  
   
This list is accurate as of: 1/27/17  2:35 PM.  Always use your most recent med list.  
  
  
  
  
 nyzknhzvcp-kyjwenum-jzwhse ala 200-25-25 mg Tab Commonly known as:  ODEFSEY Take 1 Tab by mouth daily. glucosamine 1,000 mg Tab Take  by mouth nightly. MULTIVITAMIN PO Take 1 Tab by mouth daily. nadolol 20 mg tablet Commonly known as:  CORGARD Take 1 Tab by mouth daily. oxyCODONE-acetaminophen 5-325 mg per tablet Commonly known as:  PERCOCET Take 1 Tab by mouth every four (4) hours as needed for Pain. Max Daily Amount: 6 Tabs. Indications: PAIN  
  
 venlafaxine-SR 75 mg capsule Commonly known as:  EFFEXOR-XR Take 1 Cap by mouth daily. ZANTAC 150 mg tablet Generic drug:  raNITIdine Take 150 mg by mouth as needed. Prescriptions Sent to Pharmacy Refills  
 venlafaxine-SR (EFFEXOR-XR) 75 mg capsule 3 Sig: Take 1 Cap by mouth daily. Class: Normal  
 Pharmacy: [id number] #: [phone] Route: Oral  
  
Follow-up Instructions Return in about 6 weeks (around 3/10/2017) for depression. Patient Instructions Recovering From Depression: Care Instructions Your Care Instructions Taking good care of yourself is important as you recover from depression. In time, your symptoms will fade as your treatment takes hold. Do not give up. Instead, focus your energy on getting better. Your mood will improve. It just takes some time. Focus on things that can help you feel better, such as being with friends and family, eating well, and getting enough rest. But take things slowly. Do not do too much too soon. You will begin to feel better gradually. Follow-up care is a key part of your treatment and safety.  Be sure to make and go to all appointments, and call your doctor if you are having problems. It's also a good idea to know your test results and keep a list of the medicines you take. How can you care for yourself at home? Be realistic · If you have a large task to do, break it up into smaller steps you can handle, and just do what you can. · You may want to put off important decisions until your depression has lifted. If you have plans that will have a major impact on your life, such as marriage, divorce, or a job change, try to wait a bit. Talk it over with friends and loved ones who can help you look at the overall picture first. 
· Reaching out to people for help is important. Do not isolate yourself. Let your family and friends help you. Find someone you can trust and confide in, and talk to that person. · Be patient, and be kind to yourself. Remember that depression is not your fault and is not something you can overcome with willpower alone. Treatment is necessary for depression, just like for any other illness. Feeling better takes time, and your mood will improve little by little. Stay active · Stay busy and get outside. Take a walk, or try some other light exercise. · Talk with your doctor about an exercise program. Exercise can help with mild depression. · Go to a movie or concert. Take part in a [employer] activity or other social gathering. Go to a ball game. · Ask a friend to have dinner with you. Take care of yourself · Eat a balanced diet with plenty of fresh fruits and vegetables, whole grains, and lean protein. If you have lost your appetite, eat small snacks rather than large meals. · Avoid drinking alcohol or using illegal drugs. Do not take medicines that have not been prescribed for you. They may interfere with medicines you may be taking for depression, or they may make your depression worse. · Take your medicines exactly as they are prescribed. You may start to feel better within 1 to 3 weeks of taking antidepressant medicine.  But it can take as many as 6 to 8 weeks to see more improvement. If you have questions or concerns about your medicines, or if you do not notice any improvement by 3 weeks, talk to your doctor. · If you have any side effects from your medicine, tell your doctor. Antidepressants can make you feel tired, dizzy, or nervous. Some people have dry mouth, constipation, headaches, sexual problems, or diarrhea. Many of these side effects are mild and will go away on their own after you have been taking the medicine for a few weeks. Some may last longer. Talk to your doctor if side effects are bothering you too much. You might be able to try a different medicine. · Get enough sleep. If you have problems sleeping: ¨ Go to bed at the same time every night, and get up at the same time every morning. ¨ Keep your bedroom dark and quiet. ¨ Do not exercise after 5:00 p.m. ¨ Avoid drinks with caffeine after 5:00 p.m. · Avoid sleeping pills unless they are prescribed by the doctor treating your depression. Sleeping pills may make you groggy during the day, and they may interact with other medicine you are taking. · If you have any other illnesses, such as diabetes, heart disease, or high blood pressure, make sure to continue with your treatment. Tell your doctor about all of the medicines you take, including those with or without a prescription. · Keep the numbers for these national suicide hotlines: 9-799-335-TALK (7-613.295.1964) and 7-334-LRHZPCU (4-777.345.7170). If you or someone you know talks about suicide or feeling hopeless, get help right away. When should you call for help? Call 911 anytime you think you may need emergency care. For example, call if: 
· You feel like hurting yourself or someone else. · Someone you know has depression and is about to attempt or is attempting suicide. Call your doctor now or seek immediate medical care if: 
· You hear voices. · Someone you know has depression and: ¨ Starts to give away his or her possessions. ¨ Uses illegal drugs or drinks alcohol heavily. ¨ Talks or writes about death, including writing suicide notes or talking about guns, knives, or pills. ¨ Starts to spend a lot of time alone. ¨ Acts very aggressively or suddenly appears calm. Watch closely for changes in your health, and be sure to contact your doctor if: 
· You do not get better as expected. Where can you learn more? Go to http://kate-mita.info/. Enter U622 in the search box to learn more about \"Recovering From Depression: Care Instructions. \" Current as of: July 26, 2016 Content Version: 11.1 © 7882-5501 [employer]. Care instructions adapted under license by [employer] (which disclaims liability or warranty for this information). If you have questions about a medical condition or this instruction, always ask your healthcare professional. [place] [address] any warranty or liability for your use of this information. Depression Treatment: Care Instructions Your Care Instructions Depression is a condition that affects the way you feel, think, and act. It causes symptoms such as low energy, loss of interest in daily activities, and sadness or grouchiness that goes on for a long time. Depression is very common and affects men and women of all ages. Depression is a medical illness caused by changes in the natural chemicals in your brain. It is not a character flaw, and it does not mean that you are a bad or weak person. It does not mean that you are going crazy. It is important to know that depression can be treated. Medicines, counseling, and self-care can all help. Many people do not get help because they are embarrassed or think that they will get over the depression on their own. But some people do not get better without treatment. Follow-up care is a key part of your treatment and safety.  Be sure to make and go to all appointments, and call your doctor if you are having problems. It's also a good idea to know your test results and keep a list of the medicines you take. How can you care for yourself at home? Learn about antidepressant medicines Antidepressant medicines can improve or end the symptoms of depression. You may need to take the medicine for at least 6 months, and often longer. Keep taking your medicine even if you feel better. If you stop taking it too soon, your symptoms may come back or get worse. You may start to feel better within 1 to 3 weeks of taking antidepressant medicine. But it can take as many as 6 to 8 weeks to see more improvement. Talk to your doctor if you have problems with your medicine or if you do not notice any improvement after 3 weeks. Antidepressants can make you feel tired, dizzy, or nervous. Some people have dry mouth, constipation, headaches, sexual problems, an upset stomach, or diarrhea. Many of these side effects are mild and go away on their own after you take the medicine for a few weeks. Some may last longer. Talk to your doctor if side effects bother you too much. You might be able to try a different medicine. If you are pregnant or breastfeeding, talk to your doctor about what medicines you can take. Learn about counseling In many cases, counseling can work as well as medicines to treat mild to moderate depression. Counseling is done by licensed mental health providers, such as psychologists, social workers, and some types of nurses. It can be done in one-on-one sessions or in a group setting. Many people find group sessions helpful. Cognitive-behavioral therapy is a type of counseling. In this treatment therapy, you learn how to see and change unhelpful thinking styles that may be adding to your depression. Counseling and medicines often work well when used together. To manage depression · Be physically active.  Getting 30 minutes of exercise each day is good for your body and your mind. Begin slowly if it is hard for you to get started. If you already exercise, keep it up. · Plan something pleasant for yourself every day. Include activities that you have enjoyed in the past. 
· Get enough sleep. Talk to your doctor if you have problems sleeping. · Eat a balanced diet. If you do not feel hungry, eat small snacks rather than large meals. · Do not drink alcohol, use illegal drugs, or take medicines that your doctor has not prescribed for you. They may interfere with your treatment. · Spend time with family and friends. It may help to speak openly about your depression with people you trust. 
· Take your medicines exactly as prescribed. Call your doctor if you think you are having a problem with your medicine. · Do not make major life decisions while you are depressed. Depression may change the way you think. You will be able to make better decisions after you feel better. · Think positively. Challenge negative thoughts with statements such as \"I am hopeful\"; \"Things will get better\"; and \"I can ask for the help I need. \" Write down these statements and read them often, even if you don't believe them yet. · Be patient with yourself. It took time for your depression to develop, and it will take time for your symptoms to improve. Do not take on too much or be too hard on yourself. · Learn all you can about depression from written and online materials. · Check out behavioral health classes to learn more about dealing with depression. · Keep the numbers for these national suicide hotlines: 9-939-130-TALK (5-891.619.5883) and 6-258-BCANGBO (6-484.978.2819). If you or someone you know talks about suicide or feeling hopeless, get help right away. When should you call for help? Call 911 anytime you think you may need emergency care. For example, call if: 
· You feel you cannot stop from hurting yourself or someone else. Call your doctor now or seek immediate medical care if: 
· You hear voices. · You feel much more depressed. Watch closely for changes in your health, and be sure to contact your doctor if: 
· You are having problems with your depression medicine. · You are not getting better as expected. Where can you learn more? Go to http://kate-mita.info/. Enter W979 in the search box to learn more about \"Depression Treatment: Care Instructions. \" Current as of: July 26, 2016 Content Version: 11.1 © 5620-0229 [employer]. Care instructions adapted under license by [employer] (which disclaims liability or warranty for this information). If you have questions about a medical condition or this instruction, always ask your healthcare professional. [address] any warranty or liability for your use of this information. Introducing [facility]! Dear Annmarie Thakur: 
Thank you for requesting a [employer] account. Our records indicate that you already have an active [employer] account. You can access your account anytime at [email or web address] Did you know that you can access your hospital and ER discharge instructions at any time in [employer]? You can also review all of your test results from your hospital stay or ER visit. Additional Information If you have questions, please visit the Frequently Asked Questions section of the [employer] website at [email or web address] Remember, [employer] is NOT to be used for urgent needs. For medical emergencies, dial 911. Now available from your iPhone and Android! Please provide this summary of care documentation to your next provider. Your primary care clinician is listed as Isiah Foy. If you have any questions after today's visit, please call [phone].

## 2017-01-27 NOTE — PROGRESS NOTES
Manjula Dominguez is a 46 y.o. male who presents for evaluation of depression. Was treated for same when lived in [place] about 15 years ago, believes he took lexapro and zoloft, but neither helped him. No meds since then. No SI, or HI. Has no pleasure of doing anything, only leaves house to go to doctors appts or grocery store. ROS:  Constitutional: negative for fevers, chills, anorexia and weight loss  Eyes:   negative for visual disturbance and irritation  ENT:   negative for tinnitus,sore throat,nasal congestion,ear pain,hoarseness  Respiratory:  negative for cough, hemoptysis, dyspnea,wheezing  CV:   negative for chest pain, palpitations, lower extremity edema  GI:   negative for nausea, vomiting, diarrhea, abdominal pain,melena  Genitourinary: negative for frequency, dysuria and hematuria  Musculoskel: negative for myalgias, arthralgias, back pain, muscle weakness, joint pain  Neurological:  negative for headaches, dizziness, focal weakness, numbness  Psychiatric:     ++ for depression or anxiety      Past Medical History   Diagnosis Date    Hepatitis B      treated and as of 6/29/16 pt states tests are negative:  Dr Irvin Hashimoto    HIV infection [facility]) Dx: [address]     Dr Campbell Khmer Liver failure [facility]) approx 2008     as of 6/29/16 pt denies any problems with liver       Past Surgical History   Procedure Laterality Date    Colonoscopy N/A 5/18/2016     COLONOSCOPY performed by Julian Ware MD at [facility]       History reviewed. No pertinent family history. Social History     Social History    Marital status: SINGLE     Spouse name: N/A    Number of children: N/A    Years of education: N/A     Occupational History    Not on file.      Social History Main Topics    Smoking status: Current Every Day Smoker     Packs/day: 0.50     Years: 31.00     Types: Cigarettes    Smokeless tobacco: Never Used    Alcohol use No      Comment: quit approx 2008    Drug use: Yes     Special: Marijuana Comment: as of 6/29/16 due to HIV per pt; smokes weekly when he gets nauseated    Sexual activity: Yes     Birth control/ protection: Condom     Other Topics Concern    Not on file     Social History Narrative            Visit Vitals    /76 (BP 1 Location: Right arm, BP Patient Position: Sitting)    Pulse 75    Temp 98.3 °F (36.8 °C) (Oral)    Resp 16    Ht 5' 11\" (1.803 m)    Wt 147 lb (66.7 kg)    SpO2 95%    BMI 20.5 kg/m2       Physical Examination:   General - Well appearing male  HEENT - PERRL, TM no erythema/opacification, normal nasal turbinates, no oropharyngeal erythema or exudate, MMM  Neck - supple, no bruits, no thyroidomegaly, no lymphadenopathy  Pulm - clear to auscultation bilaterally  Cardio - RRR, normal S1 S2, no murmur  Abd - soft, nontender, no masses, no HSM  Extrem - no edema, +2 distal pulses  Neuro-  No focal deficits, CN intact     Assessment/Plan:    1. Chronic depression--try effexor. rtc 6 weeks. 2.  hiv--follows with dr Aminah Montiel, on [facility]    rtc 6 weeks.         Nga Stinson III, DO

## 2017-03-02 NOTE — TELEPHONE ENCOUNTER
Pt called and states that he has been speaking with Social [employer] about appeal for disability and is needing these medication refilled. Pt states that he is also needing the NADOL medication refilled also. Please call pt if needed. Pt states that he took his last antidepressant yesterday.

## 2017-06-29 PROBLEM — B17.9 ACUTE HEPATITIS: Status: ACTIVE | Noted: 2017-01-01

## 2017-06-29 NOTE — ED NOTES
Received report from Nico Goff RN and [facility] patient [facility].

## 2017-06-29 NOTE — ED NOTES
Bedside and Verbal shift change report given to Hilario Gibbs (oncoming nurse) by Radha Archibald (offgoing nurse). Report included the following information SBAR, Kardex and Intake/Output.

## 2017-06-29 NOTE — CONSULTS
Gastroenterology Consult    Patient Name: Bay Childs    : 1965    MRN: [id number]  [facility] Date: 2017  Consult Date: 2017     Attending Physician: Dr. Maude Agarwal  Primary ID:  Dr. Devorah Covington  Primary GI: none    Subjective:     Chief Complaint: Elevated LFTs    History of Present Illness: Bay Childs is a 45 y.o. male who is seen in consultation at the request of Dr. Jermaine Quesada for the above complaint. Patient presented to the ER c/o upper abdominal pain, persistent nausea, anorexia and unintentional weight loss of 20# over several months, pale stools and dark urine. He has a h/o HIV and Hep B, both of which were being treated by ID, but he has not been on any medication since February when he lost his disability benefits. He reports that the HIV had been well controlled, with his last appt with Dr. Devorah Covington being in January. He states that his previous ID physician in [place] told him that one of the antiviral would cure his Hep B infection, so he is under the impression that is the case. After he had to stop medication in Feb., he developed moderate-severe depression, which he attributes as causing his anorexia and weight loss. He smokes marijuana to help with his appetite and the nausea, but states he does not smoke it all the time. He denies any history of IVDU or insufflation. The abdominal pain is described as being located in the upper abdomen, present intermittently for the past year, but increasing in frequency and severity over the past several months. He denies vomiting, though the nausea is rather persistent. He noticed that his urine was becoming more brown in color over the past several days, and yesterday he had a pale colored stool, described as very light cream color. He denies eating out at any restaurants recently, or even in general, and he denies any recent travel. He cannot recall whether he was vaccinated for Hep A, but he denies any h/o Hep C.  His LFTs on arrival showed transaminase levels to be >1000, TBili 3.9, and only  minimal elevation in Alk Phos. An US showed a normal appearing liver and biliary ducts. Past Medical History:   Diagnosis Date    Hepatitis B     treated and as of 6/29/16 pt states tests are negative:  Dr Myra Sibley HIV infection [facility]) Dx: Lisa Akbar    Dr Myra Sibley Liver failure [facility]) approx 2008    as of 6/29/16 pt denies any problems with liver     Past Surgical History:   Procedure Laterality Date    COLONOSCOPY N/A 5/18/2016    COLONOSCOPY performed by Monet Hull MD at [place]      History reviewed. No pertinent family history. Social History   Substance Use Topics    Smoking status: Current Every Day Smoker     Packs/day: 0.50     Years: 31.00     Types: Cigarettes    Smokeless tobacco: Never Used    Alcohol use No      Comment: quit approx 2008        No Known Allergies    Prior to Admission medications --PATIENT STATES HE HAS NOT BEEN ON ANY MEDICATIONS SINCE FEBRUARY   Medication Sig Start Date End Date Taking? Authorizing Provider   MULTIVIT-MIN/FA/LYCOPEN/LUTEIN (CENTRUM SILVER MEN PO) Take 1 Tab by mouth daily. Yes Historical Provider   auvlbdhgpf-ezlibpng-cpinpa ala (ODEFSEY) 200-25-25 mg tab Take 1 Tab by mouth daily. Patient has insurance issue and has not been able to get the med since Feb2018    Historical Provider   nadolol (CORGARD) 20 mg tablet Take 20 mg by mouth daily. Patient has insurance issue and has not been able to get the med since Feb2018    Historical Provider   venlafaxine-SR [facility] 75 mg capsule Take 75 mg by mouth daily. Patient has insurance issue and has not been able to get the med since Feb2018    Historical Provider   ranitidine (ZANTAC) 150 mg tablet Take 150 mg by mouth as needed.  Patient states not taking since the med does not help his stomach pain 6/27/12   Amada Ojeda MD       Review of Systems:    Constitutional:  No fever, chills, night sweats  (+)  weight loss, increased weakness  Respiratory: No coughing, wheezing or sob  Cardiac:  No chest pain, palpitations  Gastrointestinal:  See history of the present illness  Musculoskeletal:  No arthritis. Integumentary:  No skin rash or jaundice  Neurologic:  No confusion; no numbness or tingling of the extremities. Ext: No pain or edema    Objective:     Visit Vitals    /85    Pulse 96    Temp 97.9 °F (36.6 °C)    Resp 16    Ht 5' 11\" (1.803 m)    Wt 62.7 kg (138 lb 3.7 oz)    SpO2 96%    BMI 19.28 kg/m2        Physical Exam:   General appearance: cooperative, no distress, appears older than stated age, rather unkempt appearing  Skin: Extremities and face reveal no rashes, pallor or jaundice  HEENT: Sclerae anicteric. Extra-occular muscles are intact. No oral ulcers. No abnormal pigmentation of the lips. Cardiovascular: Regular rate and rhythm. No murmurs, gallops, or rubs. Respiratory: Comfortable breathing with no accessory muscle use. Clear breath sounds with no wheezes, rales, or rhonchi. GI: Abdomen nondistended, soft. Epigastric and RUQ ttp reported by patient. Normal active bowel sounds. No enlargement of the liver or spleen. No masses palpable. Rectal: Deferred   Musculoskeletal: No edema of the lower legs. Neurological: Gross memory appears intact. Patient is alert and oriented. Psychiatric: Mood appears appropriate with good judgement. No anxiety or agitation. Laboratory:    Recent Results (from the past 24 hour(s))   CBC WITH AUTOMATED DIFF    Collection Time: 06/29/17 10:49 AM   Result Value Ref Range    WBC 7.0 4.1 - 11.1 K/uL    RBC 5.61 4.10 - 5.70 M/uL    HGB 17.0 12.1 - 17.0 g/dL    HCT 48.7 36.6 - 50.3 %    MCV 86.8 80.0 - 99.0 FL    MCH 30.3 26.0 - 34.0 PG    MCHC 34.9 30.0 - 36.5 g/dL    RDW 14.1 11.5 - 14.5 %    PLATELET 234 (L) 231 - 400 K/uL    NEUTROPHILS 35 32 - 75 %    LYMPHOCYTES 51 (H) 12 - 49 %    MONOCYTES 9 5 - 13 %    EOSINOPHILS 5 0 - 7 %    BASOPHILS 0 0 - 1 %    ABS. NEUTROPHILS 2.5 1.8 - 8.0 K/UL    ABS. LYMPHOCYTES 3.5 0.8 - 3.5 K/UL    ABS. MONOCYTES 0.6 0.0 - 1.0 K/UL    ABS. EOSINOPHILS 0.4 0.0 - 0.4 K/UL    ABS. BASOPHILS 0.0 0.0 - 0.1 K/UL   METABOLIC PANEL, COMPREHENSIVE    Collection Time: 06/29/17 10:49 AM   Result Value Ref Range    Sodium 132 (L) 136 - 145 mmol/L    Potassium 4.0 3.5 - 5.1 mmol/L    Chloride 100 97 - 108 mmol/L    CO2 27 21 - 32 mmol/L    Anion gap 5 5 - 15 mmol/L    Glucose 83 65 - 100 mg/dL    BUN 13 6 - 20 MG/DL    Creatinine 0.79 0.70 - 1.30 MG/DL    BUN/Creatinine ratio 16 12 - 20      GFR est AA >60 >60 ml/min/1.73m2    GFR est non-AA >60 >60 ml/min/1.73m2    Calcium 8.7 8.5 - 10.1 MG/DL    Bilirubin, total 3.9 (H) 0.2 - 1.0 MG/DL    ALT (SGPT) 1064 (H) 12 - 78 U/L    AST (SGOT) 1067 (H) 15 - 37 U/L    Alk.  phosphatase 150 (H) 45 - 117 U/L    Protein, total 8.4 (H) 6.4 - 8.2 g/dL    Albumin 3.2 (L) 3.5 - 5.0 g/dL    Globulin 5.2 (H) 2.0 - 4.0 g/dL    A-G Ratio 0.6 (L) 1.1 - 2.2     PROTHROMBIN TIME + INR    Collection Time: 06/29/17 11:09 AM   Result Value Ref Range    INR 1.6 (H) 0.9 - 1.1      Prothrombin time 16.3 (H) 9.0 - 11.1 sec   PTT    Collection Time: 06/29/17 11:09 AM   Result Value Ref Range    aPTT 43.2 (H) 22.1 - 32.5 sec    aPTT, therapeutic range     58.0 - 77.0 SECS   URINALYSIS W/ REFLEX CULTURE    Collection Time: 06/29/17 12:52 PM   Result Value Ref Range    Color DARK YELLOW      Appearance CLEAR CLEAR      Specific gravity 1.024 1.003 - 1.030      pH (UA) 6.0 5.0 - 8.0      Protein NEGATIVE  NEG mg/dL    Glucose NEGATIVE  NEG mg/dL    Ketone NEGATIVE  NEG mg/dL    Blood NEGATIVE  NEG      Urobilinogen 1.0 0.2 - 1.0 EU/dL    Nitrites NEGATIVE  NEG      Leukocyte Esterase NEGATIVE  NEG      WBC 0-4 0 - 4 /hpf    RBC 0-5 0 - 5 /hpf    Epithelial cells FEW FEW /lpf    Bacteria NEGATIVE  NEG /hpf    UA:UC IF INDICATED CULTURE NOT INDICATED BY UA RESULT CNI      Mucus TRACE (A) NEG /lpf   SALICYLATE    Collection Time: 06/29/17 12:52 PM   Result Value Ref Range    SALICYLATE <6.5 (L) 2.8 - 20.0 MG/DL   ACETAMINOPHEN    Collection Time: 06/29/17 12:52 PM   Result Value Ref Range    Acetaminophen level <2 (L) 10 - 30 ug/mL   ETHYL ALCOHOL    Collection Time: 06/29/17 12:52 PM   Result Value Ref Range    ALCOHOL(ETHYL),SERUM <10 <10 MG/DL   DRUG SCREEN, URINE    Collection Time: 06/29/17 12:52 PM   Result Value Ref Range    AMPHETAMINE NEGATIVE  NEG      BARBITURATES NEGATIVE  NEG      BENZODIAZEPINE NEGATIVE  NEG      COCAINE NEGATIVE  NEG      METHADONE NEGATIVE  NEG      OPIATES NEGATIVE  NEG      PCP(PHENCYCLIDINE) NEGATIVE  NEG      THC (TH-CANNABINOL) POSITIVE (A) NEG      Drug screen comment (NOTE)    BILIRUBIN, CONFIRM    Collection Time: 06/29/17 12:52 PM   Result Value Ref Range    Bilirubin UA, confirm NEGATIVE  NEG         Imaging Review:    Results    US ABD LTD (Accession [id number]) (Order [id number])         Allergies        No Known Allergies       Result Information      Status Provider Status        Final result (Exam End: 6/29/2017  1:42 PM) Open        Study Result      EXAM:  ABDOMEN, LTD - US   INDICATION: Abnormal liver function tests. COMPARISON: None.     TECHNIQUE:   Limited real-time sonography of the right upper quadrant of the abdomen was  performed with multiple static images of the liver, gallbladder, pancreatic head  and right kidney obtained.     FINDINGS:  GALLBLADDER: The gallbladder is normal. There is no wall thickening or fluid  around the gallbladder. COMMON BILE DUCT: There is no biliary duct dilatation and the common duct  measures 5 mm in diameter. LIVER: The liver is normal in echotexture with no mass or other focal  abnormality. LIVER VASCULATURE: The portal vein flow is towards the liver. PANCREAS: The pancreatic head is normal.  RIGHT KIDNEY: The right kidney demonstrates normal echogenicity with no mass,  stone or hydronephrosis.  The right kidney measures 11.3 cm in length.     The body and tail of the pancreas, left kidney, spleen and retroperitoneum were  not evaluated on this right upper quadrant examination.     IMPRESSION: Normal ultrasound examination of the right upper quadrant. Assessment / Plan:     1. Abnormal liver function tests -- transaminase levels >1000, Tbili 3.9, Alk Phos 150   -- US shows normal liver echotexture and no biliary dilatation   -- DDX includes reactivation of Hep B infection, acute Hep A infection, HIV-related cholangiopathy, PBC, PSC   -- Will check a complete Hep B panel including viral load   -- Will get MRCP     2. Hep B - chronic. Suspect reactivation dur to stopping meds    3. HIV - has been followed by Dr. Kiki Jack, who will see him tomorrow   -- Patient is anxious to get back on antivirals, as the HIV was well-controlled on meds     Patient Active Hospital Problem List:   Active Problems:    Acute hepatitis (6/29/2017)      The above was discussed with Dr. Grey Emerson. Please await further input from him. Thank you for allowing us to participate in the care and management of this patient. Treasure Cuellar PA-C    GI Attending---Pt seen and examined. Agree with above. I am pretty certain this is a reactivation of his hepatitis B. Dr. Kiki Jack has now seen him and plans to restart HIV meds that will cover Hep B as well. He should follow up with Dr. Darby Guerrero at the [facility]. Nothing else for us to add, so will not follow along.

## 2017-06-29 NOTE — ED PROVIDER NOTES
HPI Comments: Iraj Flores is a 45 y.o. male with PMhx significant for HIV, Hepatitis B, and liver failure who presents ambulatory to the ED for further evaluation of an acute onset of stool color change to a pasty, chalky x yesterday. The pt reports associated sx of dark malodorous urine, chronic diffuse abdominal pain, and mild nausea as well. He states that he has been on disability in the past but has recently run into insurance complications and has been off of his HIV medications since 2/18/2017 and has been unable to follow up with infectious disease/PCP since January 2017. He notes that he has had no exposure to new foods and his LBM was yesterday. He expresses that he has also been experiencing extreme depression since coming off of his medications. The pt notes he has had associated sx of a decreased appetite and ~20 lbs weight loss secondary to his depression. He endorses that he was supposed to follow up with his PCP and infectious disease Q3 months but has been unable to do so leading him to the ED. He specifically denies any fevers, hematochezia, or hematuria at this time. PCP: Kellen Lockett III, DO  Infectious disease: Suzy Heredia MD      There are no other complaints, changes or physical findings at this time. Written by NELLA Song, as dictated by Leonard Patel PA-C     The history is provided by the patient. No  was used. Past Medical History:   Diagnosis Date    Hepatitis B     treated and as of 6/29/16 pt states tests are negative:  Dr Mary Ordaz HIV infection [facility]) Dx: 12    Dr Mary Ordaz Liver failure [facility]) approx 2008    as of 6/29/16 pt denies any problems with liver       Past Surgical History:   Procedure Laterality Date    COLONOSCOPY N/A 5/18/2016    COLONOSCOPY performed by Mary Henry MD at [place]         History reviewed. No pertinent family history.     Social History     Social History    Marital status: SINGLE     Spouse name: N/A    Number of children: N/A    Years of education: N/A     Occupational History    Not on file. Social History Main Topics    Smoking status: Current Every Day Smoker     Packs/day: 0.50     Years: 31.00     Types: Cigarettes    Smokeless tobacco: Never Used    Alcohol use No      Comment: quit approx 2008    Drug use: Yes     Special: Marijuana      Comment: as of 6/29/16 due to HIV per pt; smokes weekly when he gets nauseated    Sexual activity: Yes     Birth control/ protection: Condom     Other Topics Concern    Not on file     Social History Narrative         ALLERGIES: Review of patient's allergies indicates no known allergies. Review of Systems   Constitutional: Positive for unexpected weight change (secondary to depression). Negative for activity change, appetite change, chills, diaphoresis and fever. HENT: Negative for congestion, hearing loss, rhinorrhea, sinus pressure, sneezing, sore throat and trouble swallowing. Eyes: Negative for pain, redness, itching and visual disturbance. Respiratory: Negative for cough, shortness of breath and wheezing. Cardiovascular: Negative for chest pain, palpitations and leg swelling. Gastrointestinal: Positive for abdominal pain (generalized; chronic ) and nausea (mild). Negative for blood in stool, constipation, diarrhea and vomiting.        (+) stool color change    Genitourinary: Negative for dysuria and hematuria. Musculoskeletal: Negative for arthralgias, gait problem and myalgias. Skin: Negative for color change, pallor, rash and wound. Neurological: Negative for tremors, weakness, light-headedness, numbness and headaches. Psychiatric/Behavioral:        + Depression   All other systems reviewed and are negative.       Patient Vitals for the past 12 hrs:   Temp Pulse Resp BP SpO2   06/29/17 1820 97.6 °F (36.4 °C) 79 16 113/70 96 %   06/29/17 1730 - - - 137/70 -   06/29/17 1715 - - - 137/70 97 %   06/29/17 1645 - - - 102/63 96 %   06/29/17 1630 - - - 123/80 -   06/29/17 1615 - - - 103/75 -   06/29/17 1545 - - - 103/65 96 %   06/29/17 1530 - - - 107/85 96 %   06/29/17 1500 - - - 99/68 96 %   06/29/17 1430 - - - 94/65 95 %   06/29/17 1400 - - - 93/66 97 %   06/29/17 1300 - - - 97/65 96 %   06/29/17 1230 - - - 100/68 96 %   06/29/17 1200 - - - 102/63 96 %   06/29/17 1130 - - - 117/68 97 %   06/29/17 1100 - - - 106/72 95 %   06/29/17 1033 97.9 °F (36.6 °C) 96 16 111/79 100 %        Physical Exam   Constitutional: He is oriented to person, place, and time. Vital signs are normal. He appears well-developed and well-nourished. No distress. 45 y.o.  male in NAD  Communicates appropriately and in full sentences  Tearful    HENT:   Head: Normocephalic and atraumatic. Right Ear: External ear normal.   Left Ear: External ear normal.   Mouth/Throat: Oropharynx is clear and moist.   Eyes: Conjunctivae are normal. Pupils are equal, round, and reactive to light. Right eye exhibits no discharge. Left eye exhibits no discharge. Neck: Normal range of motion. Neck supple. Cardiovascular: Normal rate, regular rhythm, normal heart sounds and intact distal pulses. Pulmonary/Chest: Effort normal and breath sounds normal. No respiratory distress. He has no wheezes. Abdominal: Soft. Bowel sounds are normal. He exhibits no distension and no mass. There is tenderness. There is no rebound and no guarding. Periumbilical tenderness to palpation, minimal without rebound or guarding   Musculoskeletal: Normal range of motion. He exhibits no edema, tenderness or deformity. No neurologic, motor, vascular, or compartment embarrassment observed on exam. No focal neurologic deficits. Neurological: He is alert and oriented to person, place, and time. No cranial nerve deficit. Coordination normal.   Skin: Skin is warm and dry. No rash noted. He is not diaphoretic. No erythema. No pallor.    Acutely jaundiced   Psychiatric: Appears depressed  Tearful throughout portions of exam   Nursing note and vitals reviewed. MDM  Number of Diagnoses or Management Options  Abnormal LFTs:   Depression, unspecified depression type:   HIV (human immunodeficiency virus infection) ([facility] [address]): Marijuana use:   Diagnosis management comments: DDx: HIV, Chronic liver disease, Medication non-compliance, Insurance issues, Electrolyte abnormality, UTI, Dehydration, Hepatitis, HIV, AIDS-defining illness, coagulopathy    44 yo with PMH of hepatitis, HIV, and cirrhosis presents with weight loss, abdominal pain, and acholic stools and dark urine. Pt appears jaundiced. Will evaluate labs, US of abdomen, and will consult ID. Pt has not taken medications since February 2/2 insurance and disability issues. Discussed case with Dr. Brian Qureshi. Will obtain US and reevaluate patient. Pt pain well-controlled. LFTs acutely elevated, when prior evaluation has been without acute changes. In January, ALT and AST were 13 and 18 respectively. Consult GI who agrees with plan for admission. Will consult hospitalist who will evaluate patient for admission. Amount and/or Complexity of Data Reviewed  Clinical lab tests: ordered and reviewed  Tests in the radiology section of CPT®: ordered and reviewed  Review and summarize past medical records: yes  Discuss the patient with other providers: yes (GI  Infectious disease  Hospitalist )    Patient Progress  Patient progress: stable    ED Course       Procedures    TOBACCO COUNSELING:  Upon evaluation, pt expressed that they are a current tobacco user. Pt has been counseled on the dangers of smoking and was encouraged to quit as soon as possible in order to decrease further risks to their health. Pt has conveyed their understanding of the risks involved should they continue to use tobacco products.        I reviewed our electronic medical record system for any past medical records that were available that may contribute to the patients current condition, the nursing notes and vital signs from today's visit     Nursing notes will be reviewed as they become available in realtime while the pt is in the ED. Progress Note:  10:58 AM  The patients presenting problems have been discussed, and they are in agreement with the care plan formulated and outlined with them. I have encouraged them to ask questions as they arise throughout their visit. Will continue to monitor. PROGRESS NOTE:  11:54 AM  Pt has been re-evaluated. The case has been discussed with Edilberto Rome MD who recommends adding on an acute hepatitis panel, tylenol level, UDS, and ethyl alcohol level as well as an US of abdomen. Will consult Dr. Yg Rashid as well due to history of HIV with inability to afford medications since February. Written by NELLA Will, as dictated by VIVIENNE Carrasco PA-C. PROGRESS NOTE:  1:10 PM  VIVIENNE Carrasco PA-C went to re-evaluate the pt who was in [address] [facility] at this time. Will return to check on the pt. Written by NELLA Will, as dictated by VIVIENNE Carrasco PA-C. Progress Note:  2:09 PM  Discussed the most up to date imaging and lab results with Dr. Felisha Castillo. Will consult GI to discuss results. Will continue to monitor. PROGRESS NOTE:  2:20 PM  Pt has been re-evaluated. The pt was updated on all available lab and imaging results. The pt conveys understanding. Awaiting GI consult. Written by NELLA Will, as dictated by VIVIENNE Carrasco PA-C.     CONSULT NOTE:  2:24 PM  VIVIENNE Carrasco PA-C spoke with Dr. Preethi Newell,  Specialty: GI  Discussed pt's hx, disposition, and available diagnostic and imaging results. Reviewed care plans. Consultant recommends having the pt admitted to the hospitalist and he will see and evaluate the pt while admitted to screen for Hepatitis B. Written by NELLA Will, as dictated by VIVIENNE Carrasco PA-C     CONSULT NOTE:   2:35 PM  Derick Caputo PA-C spoke with Dr. Tatyana Le,   Specialty: Hospitalist  Discussed pt's hx, disposition, and available diagnostic and imaging results. Reviewed care plans. Consultant will evaluate pt for admission. The hospitalist expresses that this pt needs an Infectious disease doctor and if Dr. Uche Huff does not consult back the pt will need to be transferred to [facility]. Written by Efe Dallas, ED Scribe, as dictated by Derick Caputo PA-C.     CONSULT NOTE:  2:44 PM  Derick Cpauto PA-C spoke with Dr. Uche Huff,  Specialty: Infectious disease  Discussed pt's hx, disposition, and available diagnostic and imaging results. Reviewed care plans. Consultant advises that he will see and evaluate the pt in the morning. Written by Aaron Chowdhury, ED Scribe, as dictated by Derick Caputo PA-C     LABS COMPLETED AND REVIEWED:  Recent Results (from the past 12 hour(s))   CBC WITH AUTOMATED DIFF    Collection Time: 06/29/17 10:49 AM   Result Value Ref Range    WBC 7.0 4.1 - 11.1 K/uL    RBC 5.61 4.10 - 5.70 M/uL    HGB 17.0 12.1 - 17.0 g/dL    HCT 48.7 36.6 - 50.3 %    MCV 86.8 80.0 - 99.0 FL    MCH 30.3 26.0 - 34.0 PG    MCHC 34.9 30.0 - 36.5 g/dL    RDW 14.1 11.5 - 14.5 %    PLATELET 754 (L) 008 - 400 K/uL    NEUTROPHILS 35 32 - 75 %    LYMPHOCYTES 51 (H) 12 - 49 %    MONOCYTES 9 5 - 13 %    EOSINOPHILS 5 0 - 7 %    BASOPHILS 0 0 - 1 %    ABS. NEUTROPHILS 2.5 1.8 - 8.0 K/UL    ABS. LYMPHOCYTES 3.5 0.8 - 3.5 K/UL    ABS. MONOCYTES 0.6 0.0 - 1.0 K/UL    ABS. EOSINOPHILS 0.4 0.0 - 0.4 K/UL    ABS.  BASOPHILS 0.0 0.0 - 0.1 K/UL   METABOLIC PANEL, COMPREHENSIVE    Collection Time: 06/29/17 10:49 AM   Result Value Ref Range    Sodium 132 (L) 136 - 145 mmol/L    Potassium 4.0 3.5 - 5.1 mmol/L    Chloride 100 97 - 108 mmol/L    CO2 27 21 - 32 mmol/L    Anion gap 5 5 - 15 mmol/L    Glucose 83 65 - 100 mg/dL    BUN 13 6 - 20 MG/DL    Creatinine 0.79 0.70 - 1.30 MG/DL    BUN/Creatinine ratio 16 12 - 20      GFR est AA >60 >60 ml/min/1.73m2    GFR est non-AA >60 >60 ml/min/1.73m2    Calcium 8.7 8.5 - 10.1 MG/DL    Bilirubin, total 3.9 (H) 0.2 - 1.0 MG/DL    ALT (SGPT) 1064 (H) 12 - 78 U/L    AST (SGOT) 1067 (H) 15 - 37 U/L    Alk.  phosphatase 150 (H) 45 - 117 U/L    Protein, total 8.4 (H) 6.4 - 8.2 g/dL    Albumin 3.2 (L) 3.5 - 5.0 g/dL    Globulin 5.2 (H) 2.0 - 4.0 g/dL    A-G Ratio 0.6 (L) 1.1 - 2.2     PROTHROMBIN TIME + INR    Collection Time: 06/29/17 11:09 AM   Result Value Ref Range    INR 1.6 (H) 0.9 - 1.1      Prothrombin time 16.3 (H) 9.0 - 11.1 sec   PTT    Collection Time: 06/29/17 11:09 AM   Result Value Ref Range    aPTT 43.2 (H) 22.1 - 32.5 sec    aPTT, therapeutic range     58.0 - 77.0 SECS   URINALYSIS W/ REFLEX CULTURE    Collection Time: 06/29/17 12:52 PM   Result Value Ref Range    Color DARK YELLOW      Appearance CLEAR CLEAR      Specific gravity 1.024 1.003 - 1.030      pH (UA) 6.0 5.0 - 8.0      Protein NEGATIVE  NEG mg/dL    Glucose NEGATIVE  NEG mg/dL    Ketone NEGATIVE  NEG mg/dL    Blood NEGATIVE  NEG      Urobilinogen 1.0 0.2 - 1.0 EU/dL    Nitrites NEGATIVE  NEG      Leukocyte Esterase NEGATIVE  NEG      WBC 0-4 0 - 4 /hpf    RBC 0-5 0 - 5 /hpf    Epithelial cells FEW FEW /lpf    Bacteria NEGATIVE  NEG /hpf    UA:UC IF INDICATED CULTURE NOT INDICATED BY UA RESULT CNI      Mucus TRACE (A) NEG /lpf   SALICYLATE    Collection Time: 06/29/17 12:52 PM   Result Value Ref Range    SALICYLATE <0.8 (L) 2.8 - 20.0 MG/DL   ACETAMINOPHEN    Collection Time: 06/29/17 12:52 PM   Result Value Ref Range    Acetaminophen level <2 (L) 10 - 30 ug/mL   ETHYL ALCOHOL    Collection Time: 06/29/17 12:52 PM   Result Value Ref Range    ALCOHOL(ETHYL),SERUM <10 <10 MG/DL   DRUG SCREEN, URINE    Collection Time: 06/29/17 12:52 PM   Result Value Ref Range    AMPHETAMINE NEGATIVE  NEG      BARBITURATES NEGATIVE  NEG      BENZODIAZEPINE NEGATIVE  NEG      COCAINE NEGATIVE  NEG      METHADONE NEGATIVE  NEG      OPIATES NEGATIVE NEG      PCP(PHENCYCLIDINE) NEGATIVE  NEG      THC (TH-CANNABINOL) POSITIVE (A) NEG      Drug screen comment (NOTE)    BILIRUBIN, CONFIRM    Collection Time: 06/29/17 12:52 PM   Result Value Ref Range    Bilirubin UA, confirm NEGATIVE  NEG         IMAGING COMPLETED AND REVIEWED:  US ABD LTD   Final Result   EXAM:  ABDOMEN, LTD - US*   INDICATION: Abnormal liver function tests. COMPARISON: None.     TECHNIQUE:   Limited real-time sonography of the right upper quadrant of the abdomen was  performed with multiple static images of the liver, gallbladder, pancreatic head  and right kidney obtained.     FINDINGS:  GALLBLADDER: The gallbladder is normal. There is no wall thickening or fluid  around the gallbladder. COMMON BILE DUCT: There is no biliary duct dilatation and the common duct  measures 5 mm in diameter. LIVER: The liver is normal in echotexture with no mass or other focal  abnormality. LIVER VASCULATURE: The portal vein flow is towards the liver. PANCREAS: The pancreatic head is normal.  RIGHT KIDNEY: The right kidney demonstrates normal echogenicity with no mass,  stone or hydronephrosis. The right kidney measures 11.3 cm in length.     The body and tail of the pancreas, left kidney, spleen and retroperitoneum were  not evaluated on this right upper quadrant examination.     IMPRESSION  IMPRESSION: Normal ultrasound examination of the right upper quadrant. CLINICAL IMPRESSION:  1. Abnormal LFTs    2. Depression, unspecified depression type    3. Marijuana use    4. [facility]        Plan:  1. Admission  Admit Note:  2:35 PM  Patient is being admitted to the hospital by Dr. Kyler Stringer. The results of their tests and reasons for their admission have been discussed with the patient and/or available family. They convey their agreement and understanding for the need to be admitted and for their admission diagnosis. Written by NELLA Salgado as dictated by [employer]. Attestation: This note is prepared by Carole Chowdhury, acting as Scribe for [employer], [employer]. [employer]: The scribe's documentation has been prepared under my direction and personally reviewed by me in its entirety. I confirm that the note above accurately reflects all work, treatment, procedures, and medical decision making performed by me. This note will not be viewable in [address]

## 2017-06-29 NOTE — PROGRESS NOTES
Pharmacy Medication Reconciliation     The patient was interviewed regarding current PTA medication list, use and drug allergies;  Rosita WEBSTER. 15. intern was present in room and obtained permission from patient to discuss drug regimen with visitor(s) present. The patient was questioned regarding use of any other inhalers, topical products, over the counter medications, herbal medications, vitamin products or ophthalmic/nasal/otic medication use. Allergy Update: No Update    Recommendations/Findings: The following amendments were made to the patient's active medication list on file at [facility]:   1) Additions:    MULTIVIT-MIN/FA/LYCOPEN/LUTEIN (CENTRUM SILVER MEN PO)    2) Deletions:    Glucosamine   Percocet    3) Changes: None    -Patient has insurance issue and has not been able to get the Rx Med including: Odefcey, nadolol, and Venlafaxine-SR since Feb 2018    -Clarified PTA med list with patient and [facility]. PTA medication list was corrected to the following:     Prior to Admission Medications   Prescriptions Last Dose Informant Patient Reported? Taking? MULTIVIT-MIN/FA/LYCOPEN/LUTEIN (CENTRUM SILVER MEN PO) 6/28/2017 at 2000 Self Yes Yes   Sig: Take 1 Tab by mouth daily. dograrxdzm-iivjcwwt-kyacib ala (ODEFSEY) 200-25-25 mg tab Not Taking at Unknown time Self Yes No   Sig: Take 1 Tab by mouth daily. Patient has insurance issue and has not been able to get the med since Feb2018   nadolol (CORGARD) 20 mg tablet Not Taking at Unknown time Self Yes No   Sig: Take 20 mg by mouth daily. Patient has insurance issue and has not been able to get the med since Feb2018   ranitidine (ZANTAC) 150 mg tablet Not Taking at Unknown time Self Yes No   Sig: Take 150 mg by mouth as needed. Patient states not taking since the med does not help his stomach pain   venlafaxine-SR (EFFEXOR-XR) 75 mg capsule Not Taking at Unknown time Self Yes No   Sig: Take 75 mg by mouth daily.  Patient has insurance issue and has not been able to get the med since Feb2018      Facility-Administered Medications: None          Thank you,  Angela Montalvo

## 2017-06-29 NOTE — ED NOTES
Spoke with Floridalma Tamayo in the lab, who states that 3 full red tops need to be drawn, and that [facility] order for labs needs to be changed to GSPRL and to draw a dark lavender for that.

## 2017-06-29 NOTE — H&P
[place], [address]   HISTORY AND PHYSICAL       Name:  Susan Hanson   MR#:  [id number]   :  1965   Account #:  [de-identified]        Date of Adm:  2017       PRIMARY CARE PHYSICIAN: Karina Mulligan DO    INFECTIOUS DISEASE PHYSICIAN: Naina Jeffries. Carmine Gonzalez MD    ASSESSMENT AND PLAN   1. Acute hepatitis present on admission with jaundice, dark-yellow   urine, and natalia-colored stools for several days. This occurs in the   setting of known hepatitis B for years, increased epigastric and right   upper quadrant pain for 1 year, and a 20-pound weight loss. AST and   ALT are currently both approximately 1060; previously, his liver   function tests were always normal. Possible etiologies include   reactivation of the hepatitis B, new acquisition of hepatitis C or   hepatitis A, but patient denies risk factors. Not taking any new   medications, so this seems less likely. Possible biliary process with   obstruction give the light-colored stools, the weight loss, and the   increase in right upper quadrant pain. Will plan to admit to a medical   bed. Will consult GI and Infectious Diseases. Will check acute   hepatitis panel, hepatitis B PCR (although prior PCRs have always   been less than 20). Will check an MRCP to rule out any biliary   process, although the ultrasound in the emergency room was negative. Will follow serial labs. 2. Coagulopathy present on admission in the setting of acute hepatitis. INR is 1.6. Will plan to treat with vitamin K for 3 days and follow serial   INR levels. 3. Jaundice. Bilirubin 3.9, present on admission, with dark-yellow urine. Possible obstruction versus hepatocyte dysfunction. Will work up as   noted above. 4. Increasing right upper quadrant pain for 1 year, progressively   worsening, etiology unclear. Will check MRCP and evaluate for causes   of hepatitis.    5. Abnormal white-colored stools for several days, present on   admission. 6. A 20-pound weight loss over the past year. 7. Human immunodeficiency virus positive, currently off therapy since   February 2017 because of financial issues and insurance. His last CD4   cells in January 2017 were 986, with viral load less than 20.   8. Hepatitis B positive. Status unclear. Patient has always had negative   HPV DNA by PCR. 9. Deep venous thrombosis prophylaxis with Lovenox. CODE STATUS: FULL CODE. Family would be next of kin. TOTAL TIME: 65 minutes, seeing and examining the patient. I did a   detailed history and physical examination at the bedside. I reviewed all   records and studies. I discussed the case with the patient as well as   with Gastroenterology. I did complex decision making and formulated a   diagnosis and treatment plan. CHIEF COMPLAINT: \"My urine has been real dark yellow and my stool   has been a funny light color for several days. \"    HISTORY OF PRESENT ILLNESS: A 58-year-old white gentleman   with known HIV since 1991 per his report. He says the lowest his CD4   cells got was around 500, but he has never been told they were below   200 or had any AIDS-defining illness. He has had hepatitis C, although   the status of this is unclear. Every time he has been checked at   [facility] [place] going back to 2011, his DNA assay has been   negative and he has had normal LFTs. He was taking antiretrovirals,   with the last CD4 of 986 and a viral load of less than 20 in January 2017. But, in February, he ran out of options to take the medications   and he has not taken any since that time. Currently, he only takes a   multivitamin. He says over the past several months, he has had about   a 20-pound weight loss. In addition, he has had some right upper   quadrant epigastric pain, which was initially mild to moderate, but   becoming more severe recently. It has been going on for close to a   year.  He started taking Zantac to see if that would help it, but it has not   really helped and the pain has gotten progressively worse. He has had   a poor appetite and chronic nausea, which maybe is a little bit worse   over the past several days. He has not taken any new medications and   does not take any Tylenol. He denied any IV drug use, unusual food   exposures. He is not sure if he was ever vaccinated for hepatitis A. Over the past 2-3 days, he has had some increasingly dark-appearing,   almost brownish urine. It was definitely abnormal for him. In addition,   his stool was kind of a whitish, cream color, which he said is also   unusual. He has not had any karlo vomiting, diarrhea, melena, or bright   red blood per rectum; dysuria, fevers, chills, or night sweats; unusual   headaches, sore throat, runny nose, cough, or sputum. No chest pain. Because of these changes, he came into the emergency room. In the emergency room, he was hemodynamically stable and afebrile. He was saturating well on room air. He was found to have an AST and   ALT of 1067 and 1064, respectively. His alkaline phosphatase was   mildly increased at 150. His total bilirubin was increased at 3.9. These   have always previously been normal. His albumin was 3.2. His INR   came back at 1.6. He was alert and did not have any asterixis. His   alcohol level was less than 10. We were called to admit the patient. PAST MEDICAL HISTORY   1. HIV positive since 1991. No prior AIDS-defining illnesses per his   report. Last CD4 cells were 986 on HAART, but he has been off   medication since February. 2. Hepatitis B positive, unknown initial status, whether this was chronic   active or just infection. He has always had negative HPV DNA assays. 3. History of liver failure in 2008, etiology unclear. ALLERGIES: NO KNOWN DRUG ALLERGIES. CURRENT MEDICATION: Multivitamin 1 tablet p.o. daily. FAMILY HISTORY: No children of his own. Mother is [de-identified] and has   dementia.      SOCIAL HISTORY: Tobacco: He smokes half a pack of cigarettes per   day. He has not had any alcohol in 9 years, absolutely none. He   currently lives with his mother and his brother. He is a FULL CODE. REVIEW OF SYSTEMS   CONSTITUTIONAL: No fevers, chills, or night sweats. Positive 20-  pound weight loss. Poor appetite. EYES: No visual changes. No eye pain. EARS/NOSE/THROAT: No difficulty swallowing. No sore throat. No   runny nose. RESPIRATORY: No cough or sputum. No shortness of breath. CARDIOVASCULAR: No substernal chest pain. No orthopnea. ABDOMEN: Increasing right upper quadrant epigastric pain over close   to a year, progressively worsening. Positive nausea, but no vomiting. No diarrhea. Positive light-colored stools for several days. No melena   or bright red blood per rectum. GENITOURINARY: No dysuria or hematuria. MUSCULOSKELETAL: No acute joint redness, swelling, or pain. No   fibromyalgia. ENDOCRINE: No polyuria, polydipsia. No heat or cold intolerance. SKIN: No rashes or ulcers. HEMATOLOGIC: No easy bruising or bleeding. NEUROLOGIC: No headaches. No focal motor or sensory changes. PHYSICAL EXAMINATION   VITAL SIGNS: Blood pressure 111/79, heart rate 96, respiratory rate   16, temperature 97.8. He was 100% on room air. GENERAL: He is a pleasant gentleman, lying in bed in no distress. HEENT: Normocephalic, atraumatic. Pupils equal, round, reactive. Sclerae are minimally icteric. Conjunctivae do appear normal. Nasal   mucosa without masses or discharge. Sinuses not tender. Hearing is   intact to voice. Oropharynx without oropharyngeal erythema or   exudates. NECK: No meningismus. Trachea is midline. No carotid bruit. Thyroid is   not enlarged. No nodule or tenderness. RESPIRATORY: No accessory muscles or retractions. Clear to   auscultation and percussion bilaterally. CARDIOVASCULAR: Regular rate and rhythm. Normal S1, normal S2. No murmurs, rubs, or gallops.  No lower extremity edema. ABDOMEN: Soft, moderately tender in the right upper quadrant. Nondistended. Bowel sounds are present. No rebound or guarding. No   masses or hepatosplenomegaly. LYMPH NODES: No cervical or inguinal lymphadenopathy. MUSCULOSKELETAL: No acute joint swelling, erythema, or deformity. No cyanosis or clubbing. SKIN: No rashes or ulcers. NEUROLOGIC: Awake, alert, and oriented x3. Cranial nerves 2   through 12 are intact. He moves all of his limbs symmetrically. He   moves all of limbs with equal strength. LABORATORY DATA: White blood cell count 7.0, hemoglobin 17,   platelet count 520. PT/INR 1.6, PTT 43. Sodium 132, potassium 4.0,   chloride 100, CO2 27, BUN 13, creatinine 0.79, glucose 83, calcium   8.7. Albumin 3.2, AST 1067, ALT 1064, alkaline phosphatase 150, total   bilirubin 3.9. Urinalysis has specific gravity of 1.024, pH 6.0, negative   protein, negative ketones, negative leukocyte esterase, 0-4 wbc's, 0-5   rbc's, negative bacteria. IMAGING STUDIES: Ultrasound of the abdomen showed normal   gallbladder. There was no wall thickening or fluid around the duct or   around the gallbladder. There is no biliary ductal dilation. The common   bile duct is 5 mm. The liver appeared normal, with no masses. MD STACY Tipton / Sen Morgan   D:  06/29/2017   17:01   T:  06/29/2017   17:41   Job #:  [id number]

## 2017-06-29 NOTE — IP AVS SNAPSHOT
[address] [facility] 
[phone] Patient: Cathy Field MRN: [id number] Mary Oliva You are allergic to the following No active allergies Recent Documentation Height Weight BMI Smoking Status 1.803 m 62.7 kg 19.28 kg/m2 Current Every Day Smoker Unresulted Labs Order Current Status Meek Sequeira In process HEPATITIS BE AB In process HIV-1 RNA QT BY PCR In process MISC. LAB TEST In process Emergency Contacts  (Rel.) Home Phone Work Phone Mobile Phone Smith,Allan Jacqualyn Sandhoff) [phone] -- -- About your hospitalization You were admitted on:  June 29, 2017 You last received care in the:  [address] You were discharged on:  July 3, 2017 Why you were hospitalized Your primary diagnosis was:  Not on File Your diagnoses also included:  Acute Hepatitis Providers Seen During Your Hospitalizations Provider Role Specialty Primary office phone Beck Mckeon MD Attending Provider Emergency Medicine [phone] Sahil Corbin MD Attending Provider Hospitalist [phone] Sharlene Servin MD Attending Provider Hospitalist [phone] Your Primary Care Physician (PCP) Primary Care Physician Office Phone Office Fax Devonte Zapienfolk MARLI [phone] Follow-up Information Follow up With Details Comments Contact Info Willi Luke MD In 1 month  [address] [facility] 
[phone] Kristal Morgan [phone])    call Kristal Morgan ([phone]) this week to ensure refills of odefsey will be available Gillian Ivey MD In 1 month anorectal carcinoma [address] [facility] 
[phone] Santiago Ansari MD In 1 week hep B, worsening liver function [address] [facility] [address] [facility] 
[phone] Anshul Bridges, DO In 1 week hospital follow up and repeat labs [address] [place] 
[phone] Current Discharge Medication List  
  
CONTINUE these medications which have CHANGED Dose & Instructions Dispensing Information Comments Morning Noon Evening Bedtime * ODEFSEY 200-25-25 mg Tab Generic drug:  rnvklpwnoj-gtiamlus-lchtom ala What changed:  Another medication with the same name was added. Make sure you understand how and when to take each. Your last dose was: Your next dose is:    
   
   
 Dose:  1 Tab Take 1 Tab by mouth daily. Patient has insurance issue and has not been able to get the med since Feb2018 Refills:  0  
     
   
   
   
  
 * whusycdhlh-roftguci-rvyzie ala 200-25-25 mg Tab Commonly known as:  ODEFSEY Start taking on:  7/4/2017 What changed: You were already taking a medication with the same name, and this prescription was added. Make sure you understand how and when to take each. Your last dose was: Your next dose is:    
   
   
 Dose:  1 Tab Take 1 Tab by mouth daily. Quantity:  9 Tab Refills:  0  
     
   
   
   
  
 * Notice: This list has 2 medication(s) that are the same as other medications prescribed for you. Read the directions carefully, and ask your doctor or other care provider to review them with you. CONTINUE these medications which have NOT CHANGED Dose & Instructions Dispensing Information Comments Morning Noon Evening Bedtime CENTRUM SILVER MEN PO Your last dose was: Your next dose is:    
   
   
 Dose:  1 Tab Take 1 Tab by mouth daily. Refills:  0  
     
   
   
   
  
 venlafaxine-SR 75 mg capsule Commonly known as:  EFFEXOR-XR Your last dose was:     
   
Your next dose is:    
   
   
 Dose:  75 mg  
 Take 75 mg by mouth daily. Patient has insurance issue and has not been able to get the med since  Refills:  0  
     
   
   
   
  
 ZANTAC 150 mg tablet Generic drug:  raNITIdine Your last dose was: Your next dose is:    
   
   
 Dose:  150 mg Take 150 mg by mouth as needed. Patient states not taking since the med does not help his stomach pain Refills:  0 STOP taking these medications   
 nadolol 20 mg tablet Commonly known as:  CORGARD Where to Get Your Medications Information on where to get these meds will be given to you by the nurse or doctor. ! Ask your nurse or doctor about these medications  
  rbmgrauodp-tnzokwsq-cfoayq ala 200-25-25 mg Tab Discharge Instructions HOSPITALIST DISCHARGE INSTRUCTIONS 
 
NAME: Isabel Mercer :  1965 MRN:  [id number] Date/Time:  2017 1:55 PM 
 
ADMIT DATE: 2017 DISCHARGE DATE: 2017 · It is important that you take the medication exactly as they are prescribed. · Keep your medication in the bottles provided by the pharmacist and keep a list of the medication names, dosages, and times to be taken in your wallet. · Do not take other medications without consulting your doctor. What to do at [facility] Recommended diet:  Regular Diet Recommended activity: Activity as tolerated If you have questions regarding the hospital related prescriptions or hospital related issues please call [facility] at . You can always direct your questions to your primary care doctor if you are unable to reach your hospital physician; your PCP works as an extension of your hospital doctor just like your hospital doctor is an extension of your PCP for your time at the hospital [facility]) If you experience any of the following symptoms then please call your primary care physician or return to the emergency room if you cannot get hold of your doctor: 
 
Fever, chills, nausea, vomiting, or persistent diarrhea Worsening weakness or new problems with your speech or balance Dark stools or visible blood in your stools New Leg swelling or shortness of breath as this could be signs of a clot Additional Instructions: 
 
-Allan Kulkarni from [facility] , at [address] and resume this medication day after hospital discharge. Should receive 9 tabs Odefsey. 
-Call Luz Weaver at [phone] to ensure you will be able to get refills of [address] 
-Follow up with infectious disease, Dr. Pedro Raymundo, in 1 month. -Need to establish with Dr. Octavia Burch at the [facility] for your hepatits B and worsening liver function 
-Follow up with Dr. Eleuterio Cole 
-Follow up with your PCP in 1 week for follow up labs. Bring these papers with you to your follow up appointments. The papers will help your doctors be sure to continue the care plan from the hospital. 
 
 
 
 
 
 
Information obtained by : 
I understand that if any problems occur once I am at home I am to contact my physician. I understand and acknowledge receipt of the instructions indicated above. Physician's or R.N.'s Signature                                                                  Date/Time Patient or Representative Signature Discharge Orders None [facility] Announcement We are excited to announce that we are making your provider's discharge notes available to you in [employer].   You will see these notes when they are completed and signed by the physician that discharged you from your recent hospital stay. If you have any questions or concerns about any information you see in [employer], please call the Health Information Department where you were seen or reach out to your Primary Care Provider for more information about your plan of care. Introducing [facility]! Dear Theodore Cavanaugh: 
Thank you for requesting a [employer] account. Our records indicate that you already have an active [employer] account. You can access your account anytime at [email or web address] Did you know that you can access your hospital and ER discharge instructions at any time in [employer]? You can also review all of your test results from your hospital stay or ER visit. Additional Information If you have questions, please visit the Frequently Asked Questions section of the [employer] website at [email or web address] Remember, [employer] is NOT to be used for urgent needs. For medical emergencies, dial 911. Now available from your iPhone and Android! General Information Please provide this summary of care documentation to your next provider. Patient Signature:  ____________________________________________________________ Date:  ____________________________________________________________  
  
[facility] Provider Signature:  ____________________________________________________________ Date:  ____________________________________________________________

## 2017-06-29 NOTE — PROGRESS NOTES

## 2017-06-30 NOTE — ROUTINE PROCESS
Oncology Nursing Communication Tool      8:17 AM  6/30/2017     Bedside shift change report given to Kaveh Vasquez RN (incoming nurse) by Vee Rendon RN (outgoing nurse) on Ivana Cox a 45 y.o. male who was admitted on 6/29/2017 10:34 AM. Report included the following information SBAR, Kardex, Intake/Output, MAR and Recent Results. Significant changes during shift: none      Issues for physician to address: none            Code Status: Full Code     Infections: No current active infections     Allergies: Review of patient's allergies indicates no known allergies. Current diet: DIET CARDIAC Regular       Pain Controlled [x] yes [] no   Bowel Movement [] yes [x] no   Last Bowel Movement (date) 6/29/17            Vital Signs:   Patient Vitals for the past 12 hrs:   Temp Pulse Resp BP SpO2   06/30/17 0608 97.9 °F (36.6 °C) 65 20 93/65 95 %   06/29/17 2226 98 °F (36.7 °C) 78 16 113/69 97 %      Intake & Output:     Intake/Output Summary (Last 24 hours) at 06/30/17 0817  Last data filed at 06/30/17 0659   Gross per 24 hour   Intake              120 ml   Output                0 ml   Net              120 ml      Laboratory Results:     Recent Results (from the past 12 hour(s))   METABOLIC PANEL, COMPREHENSIVE    Collection Time: 06/30/17  6:34 AM   Result Value Ref Range    Sodium 133 (L) 136 - 145 mmol/L    Potassium 3.8 3.5 - 5.1 mmol/L    Chloride 100 97 - 108 mmol/L    CO2 27 21 - 32 mmol/L    Anion gap 6 5 - 15 mmol/L    Glucose 110 (H) 65 - 100 mg/dL    BUN 11 6 - 20 MG/DL    Creatinine 0.55 (L) 0.70 - 1.30 MG/DL    BUN/Creatinine ratio 20 12 - 20      GFR est AA >60 >60 ml/min/1.73m2    GFR est non-AA >60 >60 ml/min/1.73m2    Calcium 7.8 (L) 8.5 - 10.1 MG/DL    Bilirubin, total 3.8 (H) 0.2 - 1.0 MG/DL    ALT (SGPT) 895 (H) 12 - 78 U/L    AST (SGOT) 875 (H) 15 - 37 U/L    Alk.  phosphatase 134 (H) 45 - 117 U/L    Protein, total 7.1 6.4 - 8.2 g/dL    Albumin 2.5 (L) 3.5 - 5.0 g/dL Globulin 4.6 (H) 2.0 - 4.0 g/dL    A-G Ratio 0.5 (L) 1.1 - 2.2     CBC WITH AUTOMATED DIFF    Collection Time: 06/30/17  6:34 AM   Result Value Ref Range    WBC 5.1 4.1 - 11.1 K/uL    RBC 5.09 4. 10 - 5.70 M/uL    HGB 15.4 12.1 - 17.0 g/dL    HCT 44.2 36.6 - 50.3 %    MCV 86.8 80.0 - 99.0 FL    MCH 30.3 26.0 - 34.0 PG    MCHC 34.8 30.0 - 36.5 g/dL    RDW 14.2 11.5 - 14.5 %    PLATELET 340 (L) 022 - 400 K/uL    NEUTROPHILS 35 32 - 75 %    LYMPHOCYTES 46 12 - 49 %    MONOCYTES 12 5 - 13 %    EOSINOPHILS 7 0 - 7 %    BASOPHILS 0 0 - 1 %    ABS. NEUTROPHILS 1.8 1.8 - 8.0 K/UL    ABS. LYMPHOCYTES 2.3 0.8 - 3.5 K/UL    ABS. MONOCYTES 0.6 0.0 - 1.0 K/UL    ABS. EOSINOPHILS 0.4 0.0 - 0.4 K/UL    ABS. BASOPHILS 0.0 0.0 - 0.1 K/UL   PROTHROMBIN TIME + INR    Collection Time: 06/30/17  6:34 AM   Result Value Ref Range    INR 1.6 (H) 0.9 - 1.1      Prothrombin time 16.4 (H) 9.0 - 11.1 sec   AMMONIA    Collection Time: 06/30/17  6:34 AM   Result Value Ref Range    Ammonia 54 (H) <32 UMOL/L              Opportunity for questions and clarifications were given to the incoming nurse. Patient's bed is in low position, side rails x2, door open PRN, call bell within reach and patient not in distress.       Kristal Nova RN

## 2017-06-30 NOTE — PROGRESS NOTES
Hospitalist Progress Note    NAME: Bay Childs   :  1965   MRN:  [id number]       Assessment / Plan:  Elevated LFT's, T.bili, INR  Jaundice  RUQ pain- improved  Known hepatitis B, suspected relapse since going off medications  -Admission with jaundice, dark-yellow, RUQ pain, natalia-colored stools for several days. No new meds.   -Abd US unremarkable  -Will check an MRCP to rule out any biliary etiology. -follow up hepatitis panel, +hep B surface ag   -[facility] recs [facility], dr. Romie Pena, as outpatient. -GI following, recs appreciated  -LFT's trending down. Human immunodeficiency virus positive  -Off therapy since 2017 because of financial issues and insurance. His last CD4 cells in 2017 were 986, with viral load less than 20.   -Labs per ID  -ID, Dr. Devorah Covington, following. Resuming HAART. [address] will provide odefsey on discharge for 10 days. Will need to confirm he will be able to get meds following this with ID nurse, Shantanu Pham, next week. Coagulopathy   -present on admission in the setting of acute hepatitis with INR 1.6.   -Will plan to treat with vitamin K for 3 days and follow serial INR levels. UDS positive for THC    Tobacco abuse  - cessation  -refuses nicotine patch    Body mass index is 19.28 kg/(m^2). Code status: Full  Prophylaxis: SCD's INR elevated and ambulatory  Recommended Disposition: Home w/Family     Subjective:     Chief Complaint / Reason for Physician Visit  Sitting up in bed eating breakfast. No N/V. Minimal abdominal pain. No BM since yesterday. Discussed with RN events overnight.      Review of Systems:  Symptom Y/N Comments  Symptom Y/N Comments   Fever/Chills n   Chest Pain n    Poor Appetite    Edema n    Cough n   Abdominal Pain y    Sputum    Joint Pain     SOB/FAIR n   Pruritis/Rash     Nausea/vomit n   Tolerating PT/OT     Diarrhea    Tolerating Diet     Constipation    Other       Could NOT obtain due to: Objective:     VITALS:   Last 24hrs VS reviewed since prior progress note. Most recent are:  Patient Vitals for the past 24 hrs:   Temp Pulse Resp BP SpO2   06/30/17 0608 97.9 °F (36.6 °C) 65 20 93/65 95 %   06/29/17 2226 98 °F (36.7 °C) 78 16 113/69 97 %   06/29/17 1820 97.6 °F (36.4 °C) 79 16 113/70 96 %   06/29/17 1730 - - - 137/70 -   06/29/17 1715 - - - 137/70 97 %   06/29/17 1645 - - - 102/63 96 %   06/29/17 1630 - - - 123/80 -   06/29/17 1615 - - - 103/75 -   06/29/17 1545 - - - 103/65 96 %   06/29/17 1530 - - - 107/85 96 %   06/29/17 1500 - - - 99/68 96 %   06/29/17 1430 - - - 94/65 95 %   06/29/17 1400 - - - 93/66 97 %       Intake/Output Summary (Last 24 hours) at 06/30/17 1308  Last data filed at 06/30/17 1005   Gross per 24 hour   Intake              240 ml   Output                0 ml   Net              240 ml        PHYSICAL EXAM:  General: WD, WN. Alert, cooperative, no acute distress    EENT:  EOMI. + icteric sclerae. MMM  Resp:  CTA bilaterally, no wheezing or rales. No accessory muscle use  CV:  Regular  rhythm,  No edema  GI:  Soft, Non distended, Non tender.  +Bowel sounds  Neurologic:  Alert and oriented X 3, normal speech,   Psych:   Good insight. Not anxious nor agitated  Skin:  No rashes. No jaundice    Reviewed most current lab test results and cultures  YES  Reviewed most current radiology test results   YES  Review and summation of old records today    NO  Reviewed patient's current orders and MAR    YES  PMH/SH reviewed - no change compared to H&P  ________________________________________________________________________  Care Plan discussed with:    Comments   Patient y    Family      RN y    Care Manager     Consultant  y ID                     Multidiciplinary team rounds were held today with , nursing, pharmacist and clinical coordinator. Patient's plan of care was discussed; medications were reviewed and discharge planning was addressed. ________________________________________________________________________  Total NON critical care TIME:  35   Minutes    Total CRITICAL CARE TIME Spent:   Minutes non procedure based      Comments   >50% of visit spent in counseling and coordination of care     ________________________________________________________________________  Kleber Jackson MD     Procedures: see electronic medical records for all procedures/Xrays and details which were not copied into this note but were reviewed prior to creation of Plan. LABS:  I reviewed today's most current labs and imaging studies.   Pertinent labs include:  Recent Labs      06/30/17   0634  06/29/17   1049   WBC  5.1  7.0   HGB  15.4  17.0   HCT  44.2  48.7   PLT  123*  138*     Recent Labs      06/30/17   0634  06/29/17   1109  06/29/17   1049   NA  133*   --   132*   K  3.8   --   4.0   CL  100   --   100   CO2  27   --   27   GLU  110*   --   83   BUN  11   --   13   CREA  0.55*   --   0.79   CA  7.8*   --   8.7   ALB  2.5*   --   3.2*   TBILI  3.8*   --   3.9*   SGOT  875*   --   1067*   ALT  895*   --   1064*   INR  1.6*  1.6*   --        Signed: Kleber Jackson MD

## 2017-06-30 NOTE — ROUTINE PROCESS

## 2017-06-30 NOTE — PROGRESS NOTES
Problem: General Medical Care Plan  Goal: *Labs within defined limits  Outcome: Progressing Towards Goal  AST trending down  Goal: *Optimal pain control at patients stated goal  Outcome: Resolved/Met Date Met:  06/30/17  denies  Goal: *Skin integrity maintained  Outcome: Resolved/Met Date Met:  06/30/17  intact  Goal: *Anxiety reduced or absent  Outcome: Resolved/Met Date Met:  06/30/17  Denies any anxiety

## 2017-06-30 NOTE — PROGRESS NOTES
Discussed medication assistance for this hospitalized pt with Dr. Gregory Meade. Staff message sent with [facility] to Dr. Gregory Meade for the [facility]  :   [email or web address] pdf    Left vm for Doretha Ledbetter RN with [facility], to call this CM back.

## 2017-06-30 NOTE — PROGRESS NOTES
Initial Nutrition Assessment:    INTERVENTIONS/RECOMMENDATIONS:   · Consider liberalizing diet to regular  · Ensure TID (chocolate)   · Consider checking vit d and b12    ASSESSMENT:   Chart reviewed, medically noted for HIV, HBV and PMH provided below. Pt reports 25 lbs (13%) wt loss in the past 5 months due to nausea, vomiting, abdominal pain and depression. Per EMR weight history pt only lost 8 lbs (5%) since January 2017, however unsure of weight source. Pt physical appearance is frail. Pt agreed to receive ensure TID. Past Medical History:   Diagnosis Date    Hepatitis B     treated and as of 6/29/16 pt states tests are negative:  Dr Florecita Doe HIV infection [facility]) Dx: Martha Doe Liver failure [facility]) approx 2008    as of 6/29/16 pt denies any problems with liver       Diet Order: Regular  % Eaten:  Patient Vitals for the past 72 hrs:   % Diet Eaten   06/30/17 1005 100 %     Pertinent Medications: [x]Reviewed []Other (zofran, PPI, Vit K,)  Pertinent Labs: [x]Reviewed []Other (elevaetd LFT's, AlkPhos and Bili)  Food Allergies: [x]NKFA  []Other   Last BM:  6/29    Skin:    [x] Intact   [] Incision  [] Breakdown  [] Other:     Wt Readings from Last 30 Encounters:   06/29/17 62.7 kg (138 lb 3.7 oz)   01/27/17 66.7 kg (147 lb)   01/23/17 66.4 kg (146 lb 6.4 oz)   09/20/16 61.3 kg (135 lb 3.2 oz)   08/25/16 62.3 kg (137 lb 6.4 oz)   07/05/16 63.8 kg (140 lb 9.6 oz)   05/18/16 67.5 kg (148 lb 12.8 oz)   04/04/16 67.1 kg (148 lb)   12/07/15 64.9 kg (143 lb)   03/26/15 61.2 kg (135 lb)   11/24/14 65.3 kg (144 lb)   05/12/14 67.1 kg (148 lb)   08/08/13 66 kg (145 lb 9.6 oz)   08/06/13 65.3 kg (144 lb)   06/28/12 65.7 kg (144 lb 12.8 oz)   06/27/12 66.7 kg (147 lb)   12/08/11 67.8 kg (149 lb 6.4 oz)   08/08/11 68 kg (150 lb)   07/15/11 67.8 kg (149 lb 6.4 oz)       Anthropometrics:   Height: 5' 11\" (180.3 cm) Weight: 62.7 kg (138 lb 3.7 oz)   IBW (%IBW):   ( ) UBW (%UBW):   (  %)   Last Weight Metrics:  Weight Loss Metrics 6/29/2017 1/27/2017 1/23/2017 9/20/2016 8/25/2016 7/5/2016 5/18/2016   Today's Wt 138 lb 3.7 oz 147 lb 146 lb 6.4 oz 135 lb 3.2 oz 137 lb 6.4 oz 140 lb 9.6 oz 148 lb 12.8 oz   BMI 19.28 kg/m2 20.5 kg/m2 20.42 kg/m2 18.86 kg/m2 19.16 kg/m2 19.62 kg/m2 20.33 kg/m2       BMI: Body mass index is 19.28 kg/(m^2). This BMI is indicative of:   []Underweight    [x]Normal    []Overweight    [] Obesity   [] Extreme Obesity (BMI>40)     Estimated Nutrition Needs (Based on):   2200 Kcals/day (MSJ: 1475 x 1.5) , 80 g (1.3 g/kg) Protein  Carbohydrate: At Least 130 g/day  Fluids: 2200 mL/day (1ml/kcal) or per primary team    NUTRITION DIAGNOSES:   Problem:  Inadequate oral food/beverage intake      Etiology: related to nausea, vomiting, abdominal pain, depression      Signs/Symptoms: as evidenced by pt report of 25 lbs (13%) wt loss in 5 months      NUTRITION INTERVENTIONS:  Meals/Snacks: General/healthful diet   Supplements: Commercial supplement              GOAL:   consume >50% of meals and ONS in 2-4 days    LEARNING NEEDS (Diet, Food/Nutrient-Drug Interaction):    [x] None Identified   [] Identified and Education Provided/Documented   [] Identified and Pt declined/was not appropriate     Cultureal, Catholic, OR Ethnic Dietary Needs:    [x] None Identified   [] Identified and Addressed     [x] Interdisciplinary Care Plan Reviewed/Documented    [x] Discharge Planning:   General healthy diet with kcal and protein dense foods     MONITORING /EVALUATION:      Food/Nutrient Intake Outcomes:  Total energy intake  Physical Signs/Symptoms Outcomes: Weight/weight change, Electrolyte and renal profile, GI profile, Glucose profile, GI    NUTRITION RISK:    [x] High     to         [x] Moderate           []  Low  []  Minimal/Uncompromised    PT SEEN FOR:    []  MD Consult: []Calorie Count      []Diabetic Diet Education        []Diet Education     []Electrolyte Management     []General Nutrition Management and Supplements     []Management of Tube Feeding     []TPN Recommendations    [x]  RN Referral:  [x]MST score >=2     []Enteral/Parenteral Nutrition PTA     []Pregnant: Gestational DM or Multigestation     []Pressure Ulcer/Wound Care needs        []  Low BMI  []  DTR Referral       Isi Castillo RDN  Pager [phone]  Weekend Pager [phone]

## 2017-06-30 NOTE — PROGRESS NOTES
Pt. Admitted from the ER due to elevated LFTs, abdominal pain, nausea, and wt. Loss. Known hx. Of HIV, Hep.B, Liver Failure, and Depression. Pt. was  having chalkey stools and dark urine. He had been off his HIV meds because his Medicaid and disability were stopped. He has appealed x 2 -- Jan. 2017. He was told it would take 5-6 mos. For the appeal.  He still hasn't heard anything. Because he couldn't get his meds, his liver is failing. .-- Pt. States he's been on disability and had Medicare and Medicaid  for years. He is unable to work ;custom  painting . The meds he takes for HIV, cause dizziness, and he can't get up on ladders. --  He [place] in [place] and will need to contact his . I called our [address] [facility] pharmacist and the [facility] costs $[phone] per mo. Care management can't pay this amt. I called Dr. Isaiah Menjivar' nurse navigator, Lewisrosalva Fabiola-- [phone] she sent me an application to have pt. Fill out. I faxed it back to her to get Dr. Isaiah Menjivar to fill out pg. 4. She will send it to [facility]  for med. Assistance. Have spoken with Nacho Oro, in [facility], about the care card and pt's. dropped Medicaid and disability. She states she has seen this happen before. She will come to see him and do a care card application and refer him to the Disability Team.     Pt. Is to start back on his med . While here in hospital, but pharmacy states it will be Mon. Before they can get it. Not sure how long it will take ADAP , but that med. will be delivered to the local [facility]) or [facility] on [address] [place]. Care manager will follow. -- Yessy Garzon. [facility]--[phone]    Care Management Interventions  PCP Verified by CM: Yes  Mode of Transport at Discharge:  Other (see comment) (family)  Transition of Care Consult (CM Consult): Discharge Planning  MyChart Signup: No  Discharge Durable Medical Equipment: No  Health Maintenance Reviewed: Yes  Physical Therapy Consult: No  Occupational Therapy Consult: No  Speech Therapy Consult: No  Current Support Network: Relative's Home (lives with brother in [facility])  Confirm Follow Up Transport: Family  Plan discussed with Pt/Family/Caregiver:  Yes

## 2017-06-30 NOTE — CONSULTS
Infectious Disease Consult    I am asked to see this patient by Dr. Mackenzie Sepulveda for advice/opinion related to evaluating treatment of relapsing HBV infection and HIV infection. The patient was interviewed and examined. All available records were reviewed in detail. The patient is a 45 y.o. male admitted to [facility][1] on 6/29/2017 with relapsing hepatitis and weight loss. He is very familiar to me as I have been following him since 2011 for both HIV and HBV infection. Both have been well controlled on daily Odefsey (rilpivirine, emtricitabine, and TAF) with undetectable viremia for both infections as of January 2017. His CD4 cts have also been very stable at over 900. He was getting his meds via Medicaid disability, but appears to have lost coverage for prescriptions in January of this year due to failure to reapply in time. He has had no meds since then. Now admitted with weight loss, increasing transaminases, and jaundice. No past history of HCV infection. Of note, in 2008 a similar relapse occurred with fulminant hepatic failure at [facility] when he went off meds, and promptly responded to re-initiation of treatment. He does have underlying chronic liver disease secondary to HBV in spite of successful suppression for many years. No evidence in the past for transformation to hepatic malignancy. He was referred to the [place] [id number], but never went. Additionally, he is followed by Dr. 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 No allergies. He is up to date on all immunizations. He is HAV immune. Allergy history, medication list, past medical history, and social history were all reviewed.      Family history is positive for diabetes. His father had throat cancer, but was a smoker.     Patient is an intermittent smoker, there is no history of IV drug abuse or ethanol abuse. He occasionally uses marijuana.       Impression:    1.  Relapsing HBV infection secondary to going off meds. He has done once before with fulminant liver failure, and clearly has underlying cirrhosis. Hopefully , will respond to restarting TAF, as part of his HIV regimen [facility]). If no response, will check for resistance and consider using entecavir. Bigger issue is making sure he has access to meds. Do not want to restart them and then not have them available as an outpatient. Have discussed with  and she will check into where he stands with his Medicaid coverage. May be able to get patient assistance as a bridge until his coverage is re-instituted. My nurse navigator, Nico Jung ([phone]) can help with this if needed. Will also need to be seen at the [facility] (Dr. Alisson Guillermo) as an outpatient. Needs appt at discharge. Will resume Sheela Cords now. 2. HIV infection. Now off HAART. Will recheck Cd4 ct, HIV RNA, HCV Ab, and genotype. Restart HAART as above. 3. Anorectal Carcinoma . Continue follow up with Dr. Esme Rhoades. Plan:    See above. Will follow with you. I need to see him in my office about one month post discharge. Needs appt at [facility] for follow up of HBV at discharge. Pharmacy cannot get the Sheela Cords until Monday. Therefore, will break it down into components, ie Truvada and rilipivirine. [facility] will provide Sheela Cords on discharge for 10 days. Application from my office for ADAP placed today and should have this in place by end of next week so he can get his meds from them. He should contact my nurse, Nico Jung ([phone]) next week to confirm this. Hopefully this will provide HAAR without a break in treatment.            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 No pertinent family history. Social History   Substance Use Topics    Smoking status: Current Every Day Smoker     Packs/day: 0.50     Years: 31.00     Types: Cigarettes    Smokeless tobacco: Never Used    Alcohol use No      Comment: quit approx 2008     Past Surgical History:   Procedure Laterality Date    COLONOSCOPY N/A 5/18/2016    COLONOSCOPY performed by Leena Childs MD at [place]      Prior to Admission medications    Medication Sig Start Date End Date Taking? Authorizing Provider   MULTIVIT-MIN/FA/LYCOPEN/LUTEIN (CENTRUM SILVER MEN PO) Take 1 Tab by mouth daily. Yes Historical Provider   vkbhnyumgy-rbjfkgmz-qtxphw ala (ODEFSEY) 200-25-25 mg tab Take 1 Tab by mouth daily. Patient has insurance issue and has not been able to get the med since Feb2018    Historical Provider   nadolol (CORGARD) 20 mg tablet Take 20 mg by mouth daily. Patient has insurance issue and has not been able to get the med since Feb2018    Historical Provider   venlafaxine-SR [facility] 75 mg capsule Take 75 mg by mouth daily. Patient has insurance issue and has not been able to get the med since Feb2018    Historical Provider   ranitidine (ZANTAC) 150 mg tablet Take 150 mg by mouth as needed. Patient states not taking since the med does not help his stomach pain 6/27/12   Rosario Vásquez MD     No Known Allergies     Review of Systems: + fatigue, natalia colored stools, dark urine, jaundice.  - fever, sweats, or chills. +weight loss. +fatigue/malasie.  -Rash.  -Swollen glands.  -Oral lesions. -Photophobia. -Jaundice.   - SOB. - cough. - abdominal pain or tenderness.  - Nausea or vomiting.  - Diarrhea. No joint inflammation or swelling. No headache or AMS. ROS otherwise negative with greater than 10 systems reviewed. Objective:   Blood pressure 93/65, pulse 65, temperature 97.9 °F (36.6 °C), resp. rate 20, height 5' 11\" (1.803 m), weight 138 lb 3.7 oz (62.7 kg), SpO2 95 %. Temp (24hrs), Av.9 °F (36.6 °C), Min:97.6 °F (36.4 °C), Max:98 °F (36.7 °C)  Exam:    General: Afebrile and not toxic. Thin. No exanthem or enanthem. No significant peripheral adenopathy. Alert and oriented x3. HEENT: EOMI. Mildly icteric. Oral mucosa normal. Conjunctivae normal. Neck supple. No thrush or OHL. Chest: Lungs clear to A&P. Regular rhythm without murmurs. No chest wall tenderness. Abdomen: Soft without organomegaly, tenderness, or masses. Nondistended. Bowel  sounds normal.   Musculoskeletal: No joint inflammation or effusions. No edema. Nail beds normal.  Neurologic: Nonfocal exam.        Data Review: WBC = 5100. H/H = 15.4/44. 2. Plts = 123,000. INR = 1.6. Bili = 3.8. ALT/AST = 895/875. AP = 134. NH4 = 54. CXR neg. US abd without obstruction.          Current Facility-Administered Medications   Medication Dose Route Frequency    sodium chloride (NS) flush 5-10 mL  5-10 mL IntraVENous Q8H    sodium chloride (NS) flush 5-10 mL  5-10 mL IntraVENous PRN    naloxone (NARCAN) injection 0.4 mg  0.4 mg IntraVENous PRN    ondansetron (ZOFRAN) injection 4 mg  4 mg IntraVENous Q4H PRN    bisacodyl (DULCOLAX) suppository 10 mg  10 mg Rectal DAILY PRN    oxyCODONE IR (ROXICODONE) tablet 5 mg  5 mg Oral Q4H PRN    pantoprazole (PROTONIX) tablet 40 mg  40 mg Oral ACB    phytonadione (VITAMIN K) 1 mg/mL oral solution 5 mg  5 mg Oral DAILY             Signed By: Christina Harrell MD     2017

## 2017-06-30 NOTE — PROGRESS NOTES
Oncology Nursing Communication Tool      11:25 PM  6/29/2017     Bedside and Verbal shift change report given to Brie Tran RN (incoming nurse) by Mary Cardenas RN (outgoing nurse) on Jarod Paige a 45 y.o. male who was admitted on 6/29/2017 10:34 AM. Report included the following information SBAR, Kardex, MAR, Accordion and Recent Results. Significant changes during shift: None      Issues for physician to address: None            Code Status: Full Code     Infections: No current active infections     Allergies: Review of patient's allergies indicates no known allergies.      Current diet: DIET CARDIAC Regular       Pain Controlled [x] yes [] no   Bowel Movement [] yes [x] no   Last Bowel Movement (date)     6/28/17            Vital Signs:   Patient Vitals for the past 12 hrs:   Temp Pulse Resp BP SpO2   06/29/17 2226 98 °F (36.7 °C) 78 16 113/69 97 %   06/29/17 1820 97.6 °F (36.4 °C) 79 16 113/70 96 %   06/29/17 1730 - - - 137/70 -   06/29/17 1715 - - - 137/70 97 %   06/29/17 1645 - - - 102/63 96 %   06/29/17 1630 - - - 123/80 -   06/29/17 1615 - - - 103/75 -   06/29/17 1545 - - - 103/65 96 %   06/29/17 1530 - - - 107/85 96 %   06/29/17 1500 - - - 99/68 96 %   06/29/17 1430 - - - 94/65 95 %   06/29/17 1400 - - - 93/66 97 %   06/29/17 1300 - - - 97/65 96 %   06/29/17 1230 - - - 100/68 96 %   06/29/17 1200 - - - 102/63 96 %   06/29/17 1130 - - - 117/68 97 %      Intake & Output:   No intake or output data in the 24 hours ending 06/29/17 8065   Laboratory Results:     Recent Results (from the past 12 hour(s))   URINALYSIS W/ REFLEX CULTURE    Collection Time: 06/29/17 12:52 PM   Result Value Ref Range    Color DARK YELLOW      Appearance CLEAR CLEAR      Specific gravity 1.024 1.003 - 1.030      pH (UA) 6.0 5.0 - 8.0      Protein NEGATIVE  NEG mg/dL    Glucose NEGATIVE  NEG mg/dL    Ketone NEGATIVE  NEG mg/dL    Blood NEGATIVE  NEG      Urobilinogen 1.0 0.2 - 1.0 EU/dL    Nitrites NEGATIVE  NEG Leukocyte Esterase NEGATIVE  NEG      WBC 0-4 0 - 4 /hpf    RBC 0-5 0 - 5 /hpf    Epithelial cells FEW FEW /lpf    Bacteria NEGATIVE  NEG /hpf    UA:UC IF INDICATED CULTURE NOT INDICATED BY UA RESULT CNI      Mucus TRACE (A) NEG /lpf   SALICYLATE    Collection Time: 06/29/17 12:52 PM   Result Value Ref Range    SALICYLATE <2.4 (L) 2.8 - 20.0 MG/DL   ACETAMINOPHEN    Collection Time: 06/29/17 12:52 PM   Result Value Ref Range    Acetaminophen level <2 (L) 10 - 30 ug/mL   ETHYL ALCOHOL    Collection Time: 06/29/17 12:52 PM   Result Value Ref Range    ALCOHOL(ETHYL),SERUM <10 <10 MG/DL   DRUG SCREEN, URINE    Collection Time: 06/29/17 12:52 PM   Result Value Ref Range    AMPHETAMINE NEGATIVE  NEG      BARBITURATES NEGATIVE  NEG      BENZODIAZEPINE NEGATIVE  NEG      COCAINE NEGATIVE  NEG      METHADONE NEGATIVE  NEG      OPIATES NEGATIVE  NEG      PCP(PHENCYCLIDINE) NEGATIVE  NEG      THC (TH-CANNABINOL) POSITIVE (A) NEG      Drug screen comment (NOTE)    BILIRUBIN, CONFIRM    Collection Time: 06/29/17 12:52 PM   Result Value Ref Range    Bilirubin UA, confirm NEGATIVE  NEG                Opportunity for questions and clarifications were given to the incoming nurse. Patient's bed is in low position, side rails x2, door open PRN, call bell within reach and patient not in distress.       Nathan Cole RN

## 2017-06-30 NOTE — ACP (ADVANCE CARE PLANNING)
Received [facility] request to assist with advance medical directive (AMD)   Explained document to patient. Patient has a friend he is considering asking to be his medical decision maker. Left document with patient for his review. He will contact [phone] [facility] if he decides he wants to complete AMD during this hospitalization.   SARTHAK Dillard, [facility], [address] [place] [address] [address]     Paging Service  [phone]

## 2017-06-30 NOTE — PROGRESS NOTES
CM called numerous pharmacies to find one that would give out 10 of the [facility] AND [facility] tabs. They are packaged in 30's. [facility]-- [place] and [address]. Has the med., and will give him 10, with the voucher that was sent by Dr. Ari Celis' nurse navigator-- Duyen Smith. CM will place the voucher(Med. Assist. Form) on the bedside chart for weekend CM. This will need to be faxed to [address] [place] [address]. Nicky Barron. Keep copy. -- Michelet Ramirez. PIETER,LEIGH--[phone]  Fax for [employer].  [place].--[phone]

## 2017-06-30 NOTE — PROGRESS NOTES
Spiritual Care Assessment/Progress Notes    Jigar Mukherjee [id number]  [id number]    1965  45 y.o.  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    HIV (human immunodeficiency virus infection)1991 07/15/2011    Liver diseasesecondary to HIV 07/15/2011    Viral hepatitis B chronic ([facility] [address] 07/15/2011        Date: 6/30/2017       Level of Pentecostalism/Spiritual Activity:  []         Involved in raúl tradition/spiritual practice    []         Not involved in raúl tradition/spiritual practice  [x]         Spiritually oriented    []         Claims no spiritual orientation    []         seeking spiritual identity  []         Feels alienated from Caodaism practice/tradition  []         Feels angry about Caodaism practice/tradition  [x]         Spirituality/Caodaism tradition is a resource for coping at this time.   []         Not able to assess due to medical condition    Services Provided Today:  []         crisis intervention    []         reading Scriptures  [x]         spiritual assessment    [x]         prayer  [x]         empathic listening/emotional support  []         rites and rituals (cite in comments)  []         life review     []         Caodaism support  []         theological development    []         advocacy  []         ethical dialog     []         blessing  []         bereavement support    []         support to family  []         anticipatory grief support   []         help with AMD  []         spiritual guidance    [] meditation      Spiritual Care Needs  []         Emotional Support  []         Spiritual/Jain Care  []         Loss/Adjustment  []         Advocacy/Referral                /Ethics  []         No needs expressed at               this time  [x]         Other: (note in               comments)  [address]  []         Follow up visits with               pt/family  []         Provide materials  []         Schedule sacraments  []         Contact Community               Clergy  [x]         Follow up as needed  []         Other: (note in               comments)     Comments: Received [employer] request to assist with advance medical directive (AMD)   Explained document to patient. Patient has a friend he is considering asking to be his medical decision maker. Left document with patient for his review. He will contact [phone] [facility] if he decides he wants to complete AMD during this hospitalization. Patient shared he was baptized and has good spiritual support. He was receptive to assurance of prayer. Advised him of  availability.   Yady Duff, SARTHAK, [facility], [address] [place] [address] [address]     Paging Service  [phone])

## 2017-06-30 NOTE — PROGRESS NOTES
1500- Pastoral at bedside. Bedside shift change report given to Mike Ren (oncoming nurse) by Carly Tillman (offgoing nurse). Report included the following information SBAR, Kardex, Procedure Summary, Intake/Output, MAR, Recent Results and Med Rec Status.

## 2017-06-30 NOTE — PROGRESS NOTES
Received call from Mallika Jasmine, [address] at [facility] regarding [facility] AND [facility] medication assistance for pt. The hospital pharmacy cannot get the medication until Monday. The medication costs ~ $2300 for 30 day supply at the [place] [address]. Faxed an application and formulary for ADAP to [facility]. Also faxed the [facility] Advancing Access PAP to [facility]. Pt should apply for ADAP first.     Spoke to Jone Rosen RN at the [facility]. Unfortunately it is brittnee year end for the [facility] and the pharmacy is closed today for inventory. Also, ADAP and the pharmacy are closed on Monday and Tuesday for the holiday. The pt should go ahead and complete the application today though to get the process started. [facility] will have pt complete the first three pages of the ADAP form and then fax it back to this writer at [phone]. Will have Dr. [facility] Dangelo complete and sign page 4. Received completed ADAP form back from [facility]. Received completed page 4 from Dr. Jay Dangelo. ADAP application was faxed to [facility] at [phone]. Received approval from Kit Lancaster, Director of pharmacy for the medical group to give pt a voucher to cover ~ 10 days of Odefsey or Truvada/Edurant, whichever is less in cost. Received approval from Lianna Castillo, clinical supervisor, to give pt a voucher     Spoke to Jared at the [facility]. [facility] AND [facility] #10 tablets is ~ $774 and the individual components (Truvada and Edurant) #10 tablets are ~ $ 820, but [place] and [place] are not in stock and would most likely not be in stock after ordering it until after the holiday. Spoke to Kang Khan at the [employer] on the [facility] who also stated [facility] and [place] are not in stock. Spoke to Kian at the [facility]. They are only open 8-5 M-F. They have three HIV pharmacies in [facility] on [address] (open 24 hours) at [phone], [address], and [place] [phone].      Spoke again to Mallika Jasmine CM at [facility] her that the [facility] medication assistance form voucher will be faxed to her at [phone]. If medication cannot be obtained from one of the [address] [facility] pharmacies, pt may pick at a local pharmacy in [facility] that stocks HIV meds. When pt is discharged, the  will need to fax the voucher to the pharmacy pt is using. There is a $1000 limit on the voucher. Hopefully ADAP will be approved later next week. Again, they and their pharmacy are closed Monday and Tuesday. Will follow next week.

## 2017-07-01 NOTE — DISCHARGE SUMMARY
Hospitalist Discharge Summary     Patient ID:  Deepali Stock  [id number]  45 y.o.  1965    PCP on record: Roberta Stallworth DO    Admit date: 6/29/2017  Discharge date and time: 7/3/2017      DISCHARGE DIAGNOSIS:      Jaundice/ RUQ pain  Elevated LFT's, T.bili, ammonia, INR  Known hepatitis B, suspected relapse since going off HAART medications   Human immunodeficiency virus positive   Coagulopathy   UDS positive for THC  Tobacco abuse      CONSULTATIONS:  IP CONSULT TO GASTROENTEROLOGY  IP CONSULT TO INFECTIOUS DISEASES    Excerpted HPI from H&P of Mikala Post MD:  A 70-year-old white gentleman   with known HIV since 1991 per his report. He says the lowest his CD4   cells got was around 500, but he has never been told they were below   200 or had any AIDS-defining illness. He has had hepatitis C, although   the status of this is unclear. Every time he has been checked at   [facility] going back to 2011, his DNA assay has been   negative and he has had normal LFTs. He was taking antiretrovirals,   with the last CD4 of 986 and a viral load of less than 20 in January 2017. But, in February, he ran out of options to take the medications   and he has not taken any since that time. Currently, he only takes a   multivitamin. He says over the past several months, he has had about   a 20-pound weight loss. In addition, he has had some right upper   quadrant epigastric pain, which was initially mild to moderate, but   becoming more severe recently. It has been going on for close to a   year. He started taking Zantac to see if that would help it, but it has not   really helped and the pain has gotten progressively worse. He has had   a poor appetite and chronic nausea, which maybe is a little bit worse   over the past several days. He has not taken any new medications and   does not take any Tylenol. He denied any IV drug use, unusual food   exposures.  He is not sure if he was ever vaccinated for hepatitis A. Over the past 2-3 days, he has had some increasingly dark-appearing,   almost brownish urine. It was definitely abnormal for him. In addition,   his stool was kind of a whitish, cream color, which he said is also   unusual. He has not had any karlo vomiting, diarrhea, melena, or bright   red blood per rectum; dysuria, fevers, chills, or night sweats; unusual   headaches, sore throat, runny nose, cough, or sputum. No chest pain. Because of these changes, he came into the emergency room.     In the emergency room, he was hemodynamically stable and afebrile. He was saturating well on room air. He was found to have an AST and   ALT of 1067 and 1064, respectively. His alkaline phosphatase was   mildly increased at 150. His total bilirubin was increased at 3.9. These   have always previously been normal. His albumin was 3.2. His INR   came back at 1.6. He was alert and did not have any asterixis. His   alcohol level was less than 10. We were called to admit the patient.    ______________________________________________________________________  DISCHARGE SUMMARY/HOSPITAL COURSE:  for full details see H&P, daily progress notes, labs, consult notes. Jaundice/ RUQ pain- improved  Elevated LFT's, T.bili, ammonia, INR  Known hepatitis B, suspected relapse since going off HAART medications   Human immunodeficiency virus positive   -Off HAART therapy since February 2017 because of financial issues and insurance. His last CD4 cells in January 2017 were 986, with viral load less than 20.   -Admission with jaundice, dark-yellow, RUQ pain, natalia-colored stools for several days. No new meds. Tylenol and etoh undetectable.  -Abd US unremarkable  -MRCP No focal hepatic abnormality. No splenomegaly. No gallstones or  ductal dilatation. -GI and infectious disease saw patient while in hospital.  -Some Viral studies pending, to follow as outpatient.  Currently hep B surface ab nonreactive, hep Be ag pos, hep B core ab pos, HIV and hep B PCR pending. Hep C virus ab nonreactive. -LFT's remain elevated in low 900's and T bili 6.5 on DC. Will need close follow up.  -Unable to obtain pharmacy to prescribe odefsey over the weekend and discharge had to be cancelled.  -[place] from pharmacy as specified by CM. Should receive 9 tabs. This will be covered by [employer].  -He will call call ID nurse Skip Delgado at [phone] to ensure he will be able to get refills of [facility].  -Follow up with infectious disease, Dr. Noemi Yi, in 1 month. -Need to establish with Dr. [facility]akrab[facility]y [facility] for your hepatits B and worsening liver function  -Follow up with Dr. Cleola Kanner for anorectal carcinoma  -Follow up with PCP in 1 week for follow up labs.       Coagulopathy   -present on admission in the setting of acute hepatitis  -received Po vitamin K while inpatient  -INR 1.9 at time of DC, will need close follow up.     UDS positive for THC  Tobacco abuse  - cessation  -refuses nicotine patch        _______________________________________________________________________  Patient seen and examined by me on discharge day. Pertinent Findings:  Gen:    Not in distress  Chest: Clear lungs  CVS:   Regular rhythm. No edema  Abd:  Soft, not distended, not tender  Neuro:  Alert, oriented x 3, no focal deficits  _______________________________________________________________________  DISCHARGE MEDICATIONS:     Current Discharge Medication List      START taking these medications    Details   !! xrwgotpxcb-xlrzduyr-bbrsfn ala (ODEFSEY) 200-25-25 mg tab Take 1 Tab by mouth daily. Qty: 10 Tab, Refills: 0         !! - Potential duplicate medications found. Please discuss with provider. CONTINUE these medications which have NOT CHANGED    Details   MULTIVIT-MIN/FA/LYCOPEN/LUTEIN (CENTRUM SILVER MEN PO) Take 1 Tab by mouth daily.       !! ntvviomoef-bkyzomvs-wlufiu ala (ODEFSEY) 200-25-25 mg tab Take 1 Tab by mouth daily. Patient has insurance issue and has not been able to get the med since Feb2018      venlafaxine-SR [facility] 75 mg capsule Take 75 mg by mouth daily. Patient has insurance issue and has not been able to get the med since Feb2018      ranitidine (ZANTAC) 150 mg tablet Take 150 mg by mouth as needed. Patient states not taking since the med does not help his stomach pain       !! - Potential duplicate medications found. Please discuss with provider. STOP taking these medications       nadolol (CORGARD) 20 mg tablet Comments:   Reason for Stopping:                 My Recommended Diet, Activity, Wound Care, and follow-up labs are listed in the patient's Discharge Insturctions which I have personally completed and reviewed.     _______________________________________________________________________  DISPOSITION:    Home with Family: x   Home with HH/PT/OT/RN:    SNF/LTC:    ERICK:    OTHER:        Condition at Discharge:  Stable  _______________________________________________________________________  Follow up with:   PCP : Zuly Craft III, DO  Follow-up Information     Follow up With Details Comments Dallas Crump MD In 1 month  [address] [facility]  [address]       Monda James ([phone])    call Monda James ([phone]) this week to ensure refills of odefsey will be available     Lidia Stevenson MD In 1 month anorectal [address]  [address], MD In 1 week hep B, worsening liver function [address] [facility]  [address] [place] [address] IIIDO In 1 week hospital follow up and repeat labs [address] [facility]  [phone]                Total time in minutes spent coordinating this discharge (includes going over instructions, follow-up, prescriptions, and preparing report for sign off to her PCP) :  45 minutes    Signed:  Gricel Osei MD

## 2017-07-01 NOTE — PROGRESS NOTES
Oncology Nursing Communication Tool      7:25 AM  7/1/2017     Bedside shift change report given to Dary Tovar RN (incoming nurse) by Cecille Solis RN (outgoing nurse) on Fatmata Sanchez a 45 y.o. male who was admitted on 6/29/2017 10:34 AM. Report included the following information SBAR, Kardex, MAR, Accordion and Recent Results. Significant changes during shift: New order for melatonin      Issues for physician to address: None            Code Status: Full Code     Infections: No current active infections     Allergies: Review of patient's allergies indicates no known allergies.      Current diet: DIET CARDIAC Regular  DIET NUTRITIONAL SUPPLEMENTS Breakfast, Lunch, Dinner; Ensure Complete  DIET NUTRITIONAL SUPPLEMENTS Dinner; Magic Cups       Pain Controlled [x] yes [] no   Bowel Movement [] yes [x] no   Last Bowel Movement (date)     6/28/17            Vital Signs:   Patient Vitals for the past 12 hrs:   Temp Pulse Resp BP SpO2   07/01/17 0554 97.8 °F (36.6 °C) 66 16 93/60 95 %   06/30/17 2240 98.4 °F (36.9 °C) 64 16 105/67 95 %      Intake & Output:     Intake/Output Summary (Last 24 hours) at 07/01/17 0725  Last data filed at 06/30/17 1835   Gross per 24 hour   Intake              360 ml   Output              275 ml   Net               85 ml      Laboratory Results:     Recent Results (from the past 12 hour(s))   PROTHROMBIN TIME + INR    Collection Time: 07/01/17  5:58 AM   Result Value Ref Range    INR 1.8 (H) 0.9 - 1.1      Prothrombin time 18.0 (H) 9.0 - 39.9 sec   METABOLIC PANEL, COMPREHENSIVE    Collection Time: 07/01/17  5:58 AM   Result Value Ref Range    Sodium 133 (L) 136 - 145 mmol/L    Potassium 4.4 3.5 - 5.1 mmol/L    Chloride 101 97 - 108 mmol/L    CO2 26 21 - 32 mmol/L    Anion gap 6 5 - 15 mmol/L    Glucose 88 65 - 100 mg/dL    BUN 9 6 - 20 MG/DL    Creatinine 0.60 (L) 0.70 - 1.30 MG/DL    BUN/Creatinine ratio 15 12 - 20      GFR est AA >60 >60 ml/min/1.73m2    GFR est non-AA >60 >60 ml/min/1.73m2    Calcium 8.1 (L) 8.5 - 10.1 MG/DL    Bilirubin, total 4.6 (H) 0.2 - 1.0 MG/DL    ALT (SGPT) 874 (H) 12 - 78 U/L    AST (SGOT) 852 (H) 15 - 37 U/L    Alk. phosphatase 132 (H) 45 - 117 U/L    Protein, total 7.0 6.4 - 8.2 g/dL    Albumin 2.6 (L) 3.5 - 5.0 g/dL    Globulin 4.4 (H) 2.0 - 4.0 g/dL    A-G Ratio 0.6 (L) 1.1 - 2.2     CBC W/O DIFF    Collection Time: 07/01/17  5:58 AM   Result Value Ref Range    WBC 5.6 4.1 - 11.1 K/uL    RBC 5.18 4.10 - 5.70 M/uL    HGB 15.4 12.1 - 17.0 g/dL    HCT 44.9 36.6 - 50.3 %    MCV 86.7 80.0 - 99.0 FL    MCH 29.7 26.0 - 34.0 PG    MCHC 34.3 30.0 - 36.5 g/dL    RDW 14.4 11.5 - 14.5 %    PLATELET 99 (L) 807 - 400 K/uL              Opportunity for questions and clarifications were given to the incoming nurse. Patient's bed is in low position, side rails x2, door open PRN, call bell within reach and patient not in distress.       Drinda Cockayne, RN

## 2017-07-01 NOTE — PROGRESS NOTES
Oncology Nursing Communication Tool      7:37 PM  7/1/2017     Bedside and Verbal shift change report given to THANH Lin (incoming nurse) by Eric Hancock (outgoing nurse) on James Dalton a 45 y.o. male who was admitted on 6/29/2017 10:34 AM. Report included the following information SBAR, Kardex, Procedure Summary, Intake/Output, MAR, Accordion and Recent Results. Significant changes during shift: Pt not able to get medication needed outpatient. Case management working on getting medication. Issues for physician to address: Same as above          Code Status: Full Code     Infections: No current active infections     Allergies: Review of patient's allergies indicates no known allergies. Current diet: DIET CARDIAC Regular  DIET NUTRITIONAL SUPPLEMENTS Breakfast, Lunch, Dinner; Ensure Complete  DIET NUTRITIONAL SUPPLEMENTS Dinner; Magic Cups       Pain Controlled [x] yes [] no   Bowel Movement [] yes [x] no   Last Bowel Movement (date) 6/30/17            Vital Signs:   Patient Vitals for the past 12 hrs:   Temp Pulse Resp BP SpO2   07/01/17 1404 98.1 °F (36.7 °C) 61 16 97/76 99 %      Intake & Output:   No intake or output data in the 24 hours ending 07/01/17 1937   Laboratory Results:   No results found for this or any previous visit (from the past 12 hour(s)). Opportunity for questions and clarifications were given to the incoming nurse. Patient's bed is in low position, side rails x2, door open PRN, call bell within reach and patient not in distress.       Eric Hancock

## 2017-07-01 NOTE — DISCHARGE INSTRUCTIONS
HOSPITALIST DISCHARGE INSTRUCTIONS    NAME: Jarod Paige   :  1965   MRN:  [id number]     Date/Time:  2017 1:55 PM    ADMIT DATE: 2017   DISCHARGE DATE: 2017         · It is important that you take the medication exactly as they are prescribed. · Keep your medication in the bottles provided by the pharmacist and keep a list of the medication names, dosages, and times to be taken in your wallet. · Do not take other medications without consulting your doctor. What to do at [address]:  Regular Diet    Recommended activity: Activity as tolerated      If you have questions regarding the hospital related prescriptions or hospital related issues please call [facility] at . You can always direct your questions to your primary care doctor if you are unable to reach your hospital physician; your PCP works as an extension of your hospital doctor just like your hospital doctor is an extension of your PCP for your time at the hospital [facility])    If you experience any of the following symptoms then please call your primary care physician or return to the emergency room if you cannot get hold of your doctor:    Fever, chills, nausea, vomiting, or persistent diarrhea  Worsening weakness or new problems with your speech or balance  Dark stools or visible blood in your stools  New Leg swelling or shortness of breath as this could be signs of a clot    Additional Instructions:    -Bety Winston from [facility] , at [address] [facility] and resume this medication day after hospital discharge. Should receive 9 tabs Odefsey.  -Call John Ewing at [phone] to ensure you will be able to get refills of [address].  -Follow up with infectious disease, Dr. Kelly Wilson, in 1 month.   -Need to establish with Dr. Sunday Muhammad at the [facility] for your hepatits B and worsening liver function  -Follow up with Dr. Ruslan Mcguire  -Follow up with your PCP in 1 week for follow up labs. Bring these papers with you to your follow up appointments. The papers will help your doctors be sure to continue the care plan from the hospital.              Information obtained by :  I understand that if any problems occur once I am at home I am to contact my physician. I understand and acknowledge receipt of the instructions indicated above.                                                                                                                                            Physician's or R.N.'s Signature                                                                  Date/Time                                                                                                                                              Patient or Representative Signature

## 2017-07-01 NOTE — PROGRESS NOTES
Hospitalist Progress Note    NAME: Fatmata Sanchez   :  1965   MRN:  [id number]       Assessment / Plan:  Jaundice/ RUQ pain- improved  Elevated LFT's, T.bili, INR  Known hepatitis B, suspected relapse since going off HAART medications   Human immunodeficiency virus positive   -Off HAART therapy since 2017 because of financial issues and insurance. His last CD4 cells in 2017 were 986, with viral load less than 20.   -Admission with jaundice, dark-yellow, RUQ pain, natalia-colored stools for several days. No new meds. Tylenol and etoh undetectable.  -Abd US unremarkable  -MRCP No focal hepatic abnormality. No splenomegaly. No gallstones or  ductal dilatation. -GI and infectious disease saw patient while in hospital.  -Some Viral studies pending. Currently hep B surface ab nonreactive, hep Be ag pos, hep B core ab pos, HIV and hep B PCR pending. Hep C virus ab nonreactive.  -On DC will call call ID nurse Joseph Hutton at [phone] next week to ensure he will be able to get refills of [address]  -Follow up with infectious disease, Dr. Kiki Jack, in 1 month. -[facility] your hepatits B and worsening liver function  -Follow up with Dr. Rosibel Hancock for anorectal carcinoma  -Follow up with PCP in 1 week for follow up labs.    -Unable to find a pharmacy that will fill 10 tabs of odefsey or truvada/edurant as minimum is 30 tabs per bottle and they will not break bottle seals. 30 tabs would exceed the jaspreet cost allowance. For this reason discharge home was cancelled and he will need to stay in hospital to receive HAART medications until he is able to secure these meds as an outpatient. Coagulopathy   -present on admission in the setting of acute hepatitis with INR 1.6.   -Will plan to treat with vitamin K for 3 days and follow serial INR levels.      UDS positive for THC    Tobacco abuse  - cessation  -refuses nicotine patch    Body mass index is 19.28 kg/(m^2). Code status: Full  Prophylaxis: SCD's INR elevated and ambulatory  Recommended Disposition: Home w/Family     Subjective:     Chief Complaint / Reason for Physician Visit  Doing well today. Minimal abd pain. No n/v. Still having light colored BM's. Was okay to discharge but unable to afford HAART medications with jaspreet allowance. Discussed with RN events overnight. Review of Systems:  Symptom Y/N Comments  Symptom Y/N Comments   Fever/Chills n   Chest Pain n    Poor Appetite    Edema n    Cough n   Abdominal Pain y  improved   Sputum    Joint Pain     SOB/FAIR n   Pruritis/Rash     Nausea/vomit n   Tolerating PT/OT     Diarrhea    Tolerating Diet     Constipation    Other       Could NOT obtain due to:      Objective:     VITALS:   Last 24hrs VS reviewed since prior progress note. Most recent are:  Patient Vitals for the past 24 hrs:   Temp Pulse Resp BP SpO2   07/01/17 1404 98.1 °F (36.7 °C) 61 16 97/76 99 %   07/01/17 0554 97.8 °F (36.6 °C) 66 16 93/60 95 %   06/30/17 2240 98.4 °F (36.9 °C) 64 16 105/67 95 %       Intake/Output Summary (Last 24 hours) at 07/01/17 1616  Last data filed at 06/30/17 1835   Gross per 24 hour   Intake                0 ml   Output              275 ml   Net             -275 ml        PHYSICAL EXAM:  General: WD, WN. Alert, cooperative, no acute distress    EENT:  EOMI. + icteric sclerae. MMM  Resp:  CTA bilaterally, no wheezing or rales. No accessory muscle use  CV:  Regular  rhythm,  No edema  GI:  Soft, Non distended, Non tender.  +Bowel sounds  Neurologic:  Alert and oriented X 3, normal speech,   Psych:   Good insight. Not anxious nor agitated  Skin:  No rashes.   No jaundice    Reviewed most current lab test results and cultures  YES  Reviewed most current radiology test results   YES  Review and summation of old records today    NO  Reviewed patient's current orders and MAR    YES  PMH/SH reviewed - no change compared to H&P  ________________________________________________________________________  Care Plan discussed with:    Comments   Patient y    Family      RN y    Care Manager y    Consultant                        Multidiciplinary team rounds were held today with , nursing, pharmacist and clinical coordinator. Patient's plan of care was discussed; medications were reviewed and discharge planning was addressed. ________________________________________________________________________  Total NON critical care TIME:  40   Minutes    Total CRITICAL CARE TIME Spent:   Minutes non procedure based      Comments   >50% of visit spent in counseling and coordination of care x    ________________________________________________________________________  Thomas Reddy MD     Procedures: see electronic medical records for all procedures/Xrays and details which were not copied into this note but were reviewed prior to creation of Plan. LABS:  I reviewed today's most current labs and imaging studies.   Pertinent labs include:  Recent Labs      07/01/17 0558  06/30/17   0634  06/29/17   1049   WBC  5.6  5.1  5.1  7.0   HGB  15.4  15.5  15.4  17.0   HCT  44.9  44.2  44.2  48.7   PLT  99*  122*  123*  138*     Recent Labs      07/01/17   0558  06/30/17   0634  06/29/17   1109  06/29/17   1049   NA  133*  133*   --   132*   K  4.4  3.8   --   4.0   CL  101  100   --   100   CO2  26  27   --   27   GLU  88  110*   --   83   BUN  9  11   --   13   CREA  0.60*  0.55*   --   0.79   CA  8.1*  7.8*   --   8.7   ALB  2.6*  2.5*   --   3.2*   TBILI  4.6*  3.8*   --   3.9*   SGOT  852*  875*   --   1067*   ALT  874*  895*   --   1064*   INR  1.8*  1.6*  1.6*   --        Signed: Thomas Reddy MD

## 2017-07-02 NOTE — PROGRESS NOTES
Bedside shift change report given to Anh Webber RN (oncoming nurse) by Lurdes Shanks RN (offgoing nurse). Report included the following information SBAR, Kardex, Intake/Output, MAR, Accordion and Recent Results. Patient Vitals for the past 12 hrs:   Temp Pulse Resp BP SpO2   07/02/17 1415 98.2 °F (36.8 °C) 99 16 118/81 99 %     Patient is in bed; bed is in lowest position, side rails times two, wheels locked, call light, bedside tray, phone, and personal items are within reach. An opportunity for questions and clarification was provided.

## 2017-07-02 NOTE — PROGRESS NOTES
Hospitalist Progress Note    NAME: Alana Pollock   :  1965   MRN:  [id number]       Assessment / Plan:  Jaundice/ RUQ pain  Elevated LFT's, T.bili, ammonia, INR  Thrombocytopenia, hypoalbuminemia  Known hepatitis B, suspected relapse since going off HAART medications   Human immunodeficiency virus positive   -Off HAART therapy since 2017 because of financial issues and insurance. His last CD4 cells in 2017 were 986, with viral load less than 20.   -Admission with jaundice, dark-yellow, RUQ pain, natalia-colored stools for several days. No new meds. Tylenol and etoh undetectable.  -Abd US unremarkable  -MRCP No focal hepatic abnormality. No splenomegaly. No gallstones or  ductal dilatation. -GI saw while inpatient.  -Some Viral studies pending. Currently hep B surface ab nonreactive, hep Be ag pos, hep B core ab pos. HIV and hep B PCR pending. Genosure pending. Hep C virus ab nonreactive. -LFT's remain elevated in 800's, T bili up at 5.3  -On DC will need to call ID nurse Sugey Ramirez at [phone] next week to ensure he will be able to get refills of Odefsey. Follow up with infectious disease, Dr. Micky Weaver, in 1 month. [facility] to [facility] your hepatits B and worsening liver function. Follow up with Dr. Maggie Cardenas for anorectal carcinoma. Follow up with PCP in 1 week for follow up labs.   -So far unable to find a pharmacy that will fill 10 tabs of odefsey or truvada/edurant as minimum is 30 tabs per bottle and they will not break bottle seals. 30 tabs would exceed the jaspreet cost allowance. -CM working on possible Monday discharge if there is a pharmacy that will fill these meds. Coagulopathy   -present on admission in the setting of acute hepatitis with INR 1.6.   -Will plan to treat with vitamin K for 3 days and follow serial INR levels.      UDS positive for THC    Tobacco abuse  - cessation  -refuses nicotine patch    Body mass index is 19.28 kg/(m^2). Code status: Full  Prophylaxis: SCD's INR elevated and ambulatory  Recommended Disposition: Home w/Family     Subjective:     Chief Complaint / Reason for Physician Visit  Minimal abd pain. No n/v. 1 BM this morning. CM working on pharmacy to fill HAART meds. Discussed with RN events overnight. Review of Systems:  Symptom Y/N Comments  Symptom Y/N Comments   Fever/Chills n   Chest Pain n    Poor Appetite    Edema n    Cough n   Abdominal Pain y  improved   Sputum    Joint Pain     SOB/FAIR n   Pruritis/Rash     Nausea/vomit n   Tolerating PT/OT     Diarrhea    Tolerating Diet     Constipation    Other       Could NOT obtain due to:      Objective:     VITALS:   Last 24hrs VS reviewed since prior progress note. Most recent are:  Patient Vitals for the past 24 hrs:   Temp Pulse Resp BP SpO2   07/02/17 0522 98.2 °F (36.8 °C) 63 14 94/57 95 %   07/01/17 2212 98.2 °F (36.8 °C) 63 16 112/71 94 %   07/01/17 1404 98.1 °F (36.7 °C) 61 16 97/76 99 %       Intake/Output Summary (Last 24 hours) at 07/02/17 1211  Last data filed at 07/01/17 2232   Gross per 24 hour   Intake              480 ml   Output                0 ml   Net              480 ml        PHYSICAL EXAM:  General: WD, WN. Alert, cooperative, no acute distress    EENT:  EOMI. + icteric sclerae. MMM  Resp:  CTA bilaterally, no wheezing or rales. No accessory muscle use  CV:  Regular  rhythm,  No edema  GI:  Soft, Non distended, Non tender.  +Bowel sounds  Neurologic:  Alert and oriented X 3, normal speech,   Psych:   Good insight. Not anxious nor agitated  Skin:  No rashes.   No jaundice    Reviewed most current lab test results and cultures  YES  Reviewed most current radiology test results   YES  Review and summation of old records today    NO  Reviewed patient's current orders and MAR    YES  PMH/SH reviewed - no change compared to H&P  ________________________________________________________________________  Care Plan discussed with: Comments   Patient y    Family      RN y    Care Manager y    Consultant                        Multidiciplinary team rounds were held today with , nursing, pharmacist and clinical coordinator. Patient's plan of care was discussed; medications were reviewed and discharge planning was addressed. ________________________________________________________________________  Total NON critical care TIME:  25   Minutes    Total CRITICAL CARE TIME Spent:   Minutes non procedure based      Comments   >50% of visit spent in counseling and coordination of care     ________________________________________________________________________  Kathy Perez MD     Procedures: see electronic medical records for all procedures/Xrays and details which were not copied into this note but were reviewed prior to creation of Plan. LABS:  I reviewed today's most current labs and imaging studies.   Pertinent labs include:  Recent Labs      07/02/17 0513 07/01/17 0558 06/30/17   0634   WBC  5.4  5.6  5.1  5.1   HGB  15.2  15.4  15.5  15.4   HCT  44.1  44.9  44.2  44.2   PLT  99*  99*  122*  123*     Recent Labs      07/02/17 0513 07/01/17 0558  06/30/17   0634   NA  134*  133*  133*   K  4.2  4.4  3.8   CL  100  101  100   CO2  28  26  27   GLU  86  88  110*   BUN  10  9  11   CREA  0.55*  0.60*  0.55*   CA  8.1*  8.1*  7.8*   ALB  2.5*  2.6*  2.5*   TBILI  5.3*  4.6*  3.8*   SGOT  868*  852*  875*   ALT  884*  874*  895*   INR  1.7*  1.8*  1.6*       Signed: Kathy Perez MD

## 2017-07-02 NOTE — PROGRESS NOTES
Bedside shift change report given to Riley (oncoming nurse) by ΣΑΡΑΝΤΙ (offgoing nurse). Report included the following information SBAR, Kardex, Intake/Output, MAR and Recent Results. Patient had no c/o pain. Requested melatonin, for sleep. Administered and pt. Slept well.

## 2017-07-03 NOTE — PROGRESS NOTES
Patient being discharged home this afternoon. Discharge teaching/ instructions given at bedside with patient including new prescription, how to take medication, as well as follow up appointments to make and keep. Patient verbalized understanding of information provided and the opportunity for clarification or questions was provided.

## 2017-07-03 NOTE — LETTER
7/5/2017 3:13 PM 
 
[facility] [address] [place] [address] Dr Flores [facility] [place] [address] To Whom it May Concern at the [place] Department of ADAP: 
 
My name is Johanne Landrum. I am the ambulatory  that is working with the above named patient. To the best of my knowledge, Mr. Ruma Gee has had no income since February 2017 except for SNAP benefits in the amount of $62 monthly. Please make it a priority to approve Mr. Marin for medication assistance for [facility]. You have received the ADAP application. It was faxed Friday June 30, 2017 and today, Wednesday July 5, 2017. Mr. Karishma White has four tablets remaining that he received from a "[employer] voucher when he was discharged from the hospital, [facility] on Monday, July 3, 2017. Thank you for your urgent attention to this matter. Sincerely, Joseph Vega RN, CCM, CDE

## 2017-07-03 NOTE — PROGRESS NOTES
It is noted that pt's discharge from [facility] was canceled due to unable to obtain 10 days worth of HAART from local pharmacies. Discussed with Dr. Toni Shea. Called ADAP just in case someone is there today and they and their pharmacy are indeed closed until Wednesday. Walked over to the [facility]. The supervisor, [facility], is off today and will not be back until Wednesday. Spoke to [employer] at the [facility]. Their supervisor is also off today and will not be back in until Thursday. Conferred with Jennifer Rosen, Pharmacist with the [facility] who advised to check with Doll Gottron, and Michelle Field,  of Clinical Operations & Performance Improvement. Conferred with Michelle Field. who asked for cost of 30 day supply of Odefsey before checking with [employer]. Ninety day supply of Odefsey at [employer] is $5591. 99. Spoke to [place] at the [facility] (open only M-F). They will break the seal on a bottle of Odefsey. The cost of 10 tablets is $1007. The cost of 9 tablets is $907. Faxed the [facility] approved voucher with [employer] # to [place] at [phone]. Discussed with Dr. Toni Shea who asked that the  at [facility] be called to re-initiate the pt discharge and that this NN follow up with pt on Wednesday as tomorrow is a holiday. Spoke to [place] who stated the rx went through on the bin and group #. Pt will need an rx in hand for the [facility] in order for her to fill med. Spoke to Yasemin Lu, [employer] at [facility] and informed her that pt may  9 tablets of Odefsey at the [facility] [address] with rx in hand. Address is [address] [place] [address], [address] Phone - [phone] and fax [phone]. Yahir Carballo indicated she will contact the doctor right away regarding discharging pt. Dr. Toni Shea was informed of progress. He will see pt during rounds this afternoon.      Per hospital case management notes and hospitalist notes, pt is ready for discharge. Received vm from Dr. Tamika Gamez asking that pt's f/u appt with him be changed to two weeks post dc, and that pt have cmp and cbc w/diff labs done on Friday. It is noted that pt's appt with Dr. Tamika Gamez is 7/14/17 and that is okay per Dr. Tamika Gamez. Also, pt is uninsured and has no Jany Roland. Dr. Tamika Gamez indicated that [facility] was to do an accelerated application for the Care Card. Pt is to see Dr. Almas Tovar in one week and he may do the labs that Dr. Tamika Gamez wants pt to have prior to his appt. Will contact pt on Wednesday 7/5/17 as tomorrow the office is closed for the [place]osh[place] Day holiday. Just received a call from Aileena ([place]) at the [facility]. When she entered the pt's rx info, there was a $500 cap on the plan so only 5 tablets were dispensed. Discussed with Endy Boyce, [facility] pharmacist who suggested completing a second voucher if ADAP is not approved in time.

## 2017-07-03 NOTE — PROGRESS NOTES
Bedside and Verbal shift change report given to Yuval (oncoming nurse) by Kelsi Hutchison (offgoing nurse). Report included the following information SBAR, Kardex, ED Summary, Intake/Output, MAR, Accordion and Recent Results. Patient Vitals for the past 12 hrs:   Temp Pulse Resp BP SpO2   07/03/17 0500 98.1 °F (36.7 °C) 70 16 95/60 93 %   07/02/17 2240 98 °F (36.7 °C) 66 16 101/64 94 %     No significant changes seen during shift.

## 2017-07-03 NOTE — PROGRESS NOTES
CM called the [facility] who had agreed to give the patient his prescription filled for 10 pills. They requested that CM resend the prescription and good Help fund paperwork. CHERYLE notified Dr. Shruti Hansen who did the discharge and informed the patient of pending discharge. CHERYLE then got a call from the [place] and was informed that they no longer accepted the Good Help fund. CHERYLE called several other pharmacies and [facility] [place] [facility] [place] [facility] the drug. The prescription with Good Help fund paperwork faxed. CM got a call that they could not open the bottle to dispense 10 pills. CHERYLE notified Dr Liza Carver and he cancelled the discharge.  Justa Parrish RN #[id number]

## 2017-07-03 NOTE — PROGRESS NOTES
Received call from Isiah Franklin, Dr. Adriana Griffith' nurse navigator. She is assisting with trying to get [facility] for pt. (10 tabs.) with BS voucher. She has checked with the [facility] specialty pharmacy on [address]  The cost is $1007. Pt. Will have to pay the $7.  Voucher is for $1000. I told her that the supervisor for the [facility]. flo[employer]do told us that they no longer accepted the voucher. She will check with this [facility] to see if they will. Waiting for call back. -- Rosaura DOUGLASS,FE,[phone]--[phone]    Everything is all set for pt. To be discharged and to get his med. [facility] , at [address]. Yvonne Azul. Has the med and will break the bottle. They are giving him 9 pills to keep the amt. Under $1000, which is the amt. Of the [facility] Voucher. Pt. Will drive himself when discharged. I have called Dr. Mcclelland Peers and he will discharge pt. He just needs prescription for the 9 pills--. Nurse aware. -- Imani Faust, TG,[phone]--[phone]    [facility] is still checking with the drug co. About getting his meds. After he finishes this. -- TOSIN Monae    Care Management Interventions  PCP Verified by CM: Yes  Mode of Transport at Discharge: Other (see comment) (family)  Transition of Care Consult (CM Consult): Discharge Planning  MyChart Signup: No  Discharge Durable Medical Equipment: No  Health Maintenance Reviewed: Yes  Physical Therapy Consult: No  Occupational Therapy Consult: No  Speech Therapy Consult: No  Current Support Network: Relative's Home (lives with brother in [facility])  Confirm Follow Up Transport: Family  Plan discussed with Pt/Family/Caregiver:  Yes

## 2017-07-05 NOTE — PROGRESS NOTES
Spoke to Paul Baxter, RN with the [facility] to discuss med assistance for pt. She advised calling Kari at the [facility] at [phone]. Left message for Kari to call this NN back. Spoke to Mirna with [facility] and explained the urgent matter of pt's application. She asked that pt's application be re-faxed to her attention. She will give it to [place] (Maranda) Mariza or Rachana Harris who will start working on it immediately. Application was re-faxed. 14:33- spoke to Kathy with [facility] ADAP to check on status of application that was re-faxed this morning. She needs pt's proof of income faxed to [phone] to the attention of Isabela Syed. Rx for Rhianna Reyes can be sent to the [address]. The HD closest to pt is [place] [address] on [address] Gonzales Willils to pt and asked him to proof of income. 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 He also receives $62 monthly for SNAP. He lost his disability and his last check was in February this year. Informed Kathy that pt basically has no income. A letter was faxed to the attention of Isabela Syed stating pt has no income, per her request. See letters. Spoke to Nataly at the [address] [facility] who stated an rx for Rhianna Reyes may be sent electronically in advance of ADAP approval. A refill encounter was sent to Dr. Awa Nina for Rhianna Reyes. It is noted that Dr. Awa Nina approved [place] Reyes and it has been sent to the state pharmacy. Spoke to Kathy at [facility]. The proof of income letter has been received. Most likely the application will be approved tomorrow at which time Paul Baxter at the [facility] will be notified. Spoke to Paul Baxter and updated her on pt. She will looking for the approval.     Pt informed that he should be contacted tomorrow or Friday about Odefsey medication coverage from [facility] and that he will most likely  the medication from the [address].  Advised pt to call Paul Baxter at [phone] Friday morning if he hasn't heard from anyone. Understanding and agreement was verbalized.

## 2017-07-05 NOTE — TELEPHONE ENCOUNTER
Spoke to [place] at the [address] [place] [address] Road who stated it is okay to have Dr. Adela Cruz send an e-script for [facility] in advance of approval by MJ Barajas ordered and routed to Dr. Adela Cruz.

## 2017-07-06 NOTE — PROGRESS NOTES
Talked to Dandy Gama at [facility]. He can see that the pt's application has been entered into their database, but has no determination. Explained the urgency of pt's approval as he runs out of [address] on Saturday. Odefsey, 5 tablets, was received as the result of a [facility] voucher sent to the [employer] [facility] at [address] ([phone]) the day pt was discharged from [facility] for acute hepatitis. Dandy Gama will make Charanlyndsey Roxy aware of pt's application urgency when he arrives at work. Dandy Gama was given the phone number of Martin [place], LPN , [phone], as a second  for Dr. Blas Arshad at [facility]. Left message, with status of pt's case, for Aries Julian RN with the [facility] Department who will get the case once the ADAP application is approved. We are now just waiting for the ADAP approval. Aries Julian will let pt know when he can  [address] at the [facility] Department. Pt does not have transportation issues. Chart was routed to Laura Barrera LPN .

## 2017-07-06 NOTE — Clinical Note
Sue Quiet, this is the patient I was telling you about. See contact section history for phone numbers. Thanks so much.

## 2017-07-10 PROBLEM — R17 JAUNDICE: Status: ACTIVE | Noted: 2017-01-01

## 2017-07-10 PROBLEM — D68.9 COAGULOPATHY (HCC): Status: ACTIVE | Noted: 2017-01-01

## 2017-07-10 NOTE — ED PROVIDER NOTES
HPI Comments: David Hartman is a 45 y.o. male with PMhx significant for HIV, hepatitis B, cirrhosis and liver failure who presents ambulatory to the ED with cc of abdominal pain and fluid on his abdomen. Pt's pain is diffuse, started 2 days ago, and states it is similar to a previous episode where he had fluid in his abdomen. Pt also c/o nausea, vomiting, rectal bleeding, and jaundice. He states that he took pain medication 2 days ago without relief. He notes that he was taken off of his anti-viral medication because he was denied disability, but has since been placed back on the anti-viral medication since his ED visit on 6/29/17. Pt denies diarrhea, hematochezia, hematuria, fever, and chills. Social Hx: + Tobacco, - EtOH, + Illicit Drugs (marijuana)    PCP: Claudia Russo III,   Infectious Disease: Dr. Simi Rowe    There are no other complaints, changes or physical findings at this time. The history is provided by the patient. No  was used. Past Medical History:   Diagnosis Date    Cirrhosis ([address]     Hepatitis B     treated and as of 6/29/16 pt states tests are negative:  Dr Keerthi Gonsalves HIV (human immunodeficiency virus infection) ([address]     HIV infection ([address] Dx: 1991    Dr Keerthi Gonsalves Liver failure [facility]) approx 2008    as of 6/29/16 pt denies any problems with liver       Past Surgical History:   Procedure Laterality Date    COLONOSCOPY N/A 5/18/2016    COLONOSCOPY performed by Willem Ashby MD at [place]         History reviewed. No pertinent family history. Social History     Social History    Marital status: SINGLE     Spouse name: N/A    Number of children: N/A    Years of education: N/A     Occupational History    Not on file.      Social History Main Topics    Smoking status: Current Every Day Smoker     Packs/day: 0.50     Years: 31.00     Types: Cigarettes    Smokeless tobacco: Never Used    Alcohol use No      Comment: quit approx 2008  Drug use: Yes     Special: Marijuana      Comment: as of 6/29/16 due to HIV per pt; smokes weekly when he gets nauseated    Sexual activity: Yes     Birth control/ protection: Condom     Other Topics Concern    Not on file     Social History Narrative         ALLERGIES: Review of patient's allergies indicates no known allergies. Review of Systems   Constitutional: Negative for chills, diaphoresis, fever and unexpected weight change. HENT: Negative for rhinorrhea and sore throat. Eyes: Negative for pain. Respiratory: Negative for shortness of breath, wheezing and stridor. Cardiovascular: Negative for chest pain and leg swelling. Gastrointestinal: Positive for abdominal pain, anal bleeding, nausea and vomiting. Negative for blood in stool and diarrhea.        + abdominal fluid, -hematochezia   Genitourinary: Negative for difficulty urinating, dysuria, flank pain and hematuria. Musculoskeletal: Negative for back pain and neck stiffness. Skin: Positive for color change (yellow). Negative for rash. Neurological: Negative for seizures, syncope, weakness, light-headedness and headaches. Psychiatric/Behavioral: Negative for confusion. Vitals:    07/10/17 1156 07/10/17 1300 07/10/17 1345 07/10/17 1400   BP: 108/72 106/65 105/66 105/64   Pulse: 98 68 65 64   Resp: 12 11 12 11   Temp: 97.9 °F (36.6 °C)      SpO2: 96% 95% 97% 96%   Weight: 62.9 kg (138 lb 10.7 oz)      Height: 6' (1.829 m)               Physical Exam   Constitutional: He is oriented to person, place, and time. He appears well-developed and well-nourished. No distress. HENT:   Nose: Nose normal.   Mouth/Throat: Oropharynx is clear and moist. No oropharyngeal exudate. Eyes: EOM are normal. Pupils are equal, round, and reactive to light. Right eye exhibits no discharge. Left eye exhibits no discharge. No scleral icterus. Scleral icterus   Neck: Normal range of motion. Neck supple. No JVD present.    Cardiovascular: Normal rate, regular rhythm, normal heart sounds and intact distal pulses. No murmur heard. Pulmonary/Chest: Effort normal and breath sounds normal. No stridor. No respiratory distress. He has no wheezes. He has no rales. Abdominal: Soft. Bowel sounds are normal. He exhibits no distension. There is hepatomegaly. There is no tenderness. There is no rebound and no guarding. Hepatomegaly    Musculoskeletal: He exhibits no edema or tenderness. Neurological: He is alert and oriented to person, place, and time. Skin: Skin is warm and dry. He is not diaphoretic. Jaundiced    Psychiatric: He has a normal mood and affect. Nursing note and vitals reviewed. MDM  Number of Diagnoses or Management Options  Jaundice:      Amount and/or Complexity of Data Reviewed  Clinical lab tests: ordered and reviewed  Tests in the medicine section of CPT®: ordered and reviewed  Review and summarize past medical records: yes  Discuss the patient with other providers: yes (Hospitalist)  Independent visualization of images, tracings, or specimens: yes      ED Course       Procedures   EKG interpretation: (Preliminary)  Rhythm: normal sinus rhythm; and regular . Rate (approx.): 77; Axis: normal; P wave: normal; QRS interval: normal; QTc: 372/420 ; ST/T wave: normal;   Written by Erica London ED Scribe as dictated by Bonnie Alonzo MD    CONSULT NOTE:  3:16 PM  Bonnie Alonzo MD spoke with Dr. Richi Dunbar,  Specialty: Hospitalist  Discussed patient's hx, disposition, and available diagnostic and imaging results. Reviewed care plans. Consultant agrees with plans as outlined. Dr. Richi Dunbar will admit.     LABORATORY TESTS:  Recent Results (from the past 12 hour(s))   EKG, 12 LEAD, INITIAL    Collection Time: 07/10/17 12:08 PM   Result Value Ref Range    Ventricular Rate 77 BPM    Atrial Rate 77 BPM    P-R Interval 122 ms    QRS Duration 78 ms    Q-T Interval 372 ms    QTC Calculation (Bezet) 420 ms    Calculated P Axis 71 degrees Calculated R Axis 80 degrees    Calculated T Axis 50 degrees    Diagnosis       Normal sinus rhythm  Normal ECG  No previous ECGs available  Confirmed by Juan Rod ([id number]) on 7/10/2017 3:17:57 PM     CBC WITH AUTOMATED DIFF    Collection Time: 07/10/17 12:30 PM   Result Value Ref Range    WBC 9.6 4.1 - 11.1 K/uL    RBC 5.04 4.10 - 5.70 M/uL    HGB 15.0 12.1 - 17.0 g/dL    HCT 42.1 36.6 - 50.3 %    MCV 83.5 80.0 - 99.0 FL    MCH 29.8 26.0 - 34.0 PG    MCHC 35.6 30.0 - 36.5 g/dL    RDW 16.6 (H) 11.5 - 14.5 %    PLATELET 872 257 - 684 K/uL    NEUTROPHILS 61 32 - 75 %    LYMPHOCYTES 25 12 - 49 %    MONOCYTES 13 5 - 13 %    EOSINOPHILS 1 0 - 7 %    BASOPHILS 0 0 - 1 %    ABS. NEUTROPHILS 5.8 1.8 - 8.0 K/UL    ABS. LYMPHOCYTES 2.4 0.8 - 3.5 K/UL    ABS. MONOCYTES 1.3 (H) 0.0 - 1.0 K/UL    ABS. EOSINOPHILS 0.1 0.0 - 0.4 K/UL    ABS.  BASOPHILS 0.0 0.0 - 0.1 K/UL   AMMONIA    Collection Time: 07/10/17 12:30 PM   Result Value Ref Range    Ammonia 22 <32 UMOL/L   PROTHROMBIN TIME + INR    Collection Time: 07/10/17 12:30 PM   Result Value Ref Range    INR 2.4 (H) 0.9 - 1.1      Prothrombin time 25.3 (H) 9.0 - 11.1 sec   PTT    Collection Time: 07/10/17 12:30 PM   Result Value Ref Range    aPTT 49.7 (H) 22.1 - 32.5 sec    aPTT, therapeutic range     58.0 - 77.0 SECS   LACTIC ACID    Collection Time: 07/10/17 12:41 PM   Result Value Ref Range    Lactic acid 3.6 (HH) 0.4 - 2.0 MMOL/L   METABOLIC PANEL, COMPREHENSIVE    Collection Time: 07/10/17  2:01 PM   Result Value Ref Range    Sodium 137 136 - 145 mmol/L    Potassium 5.0 3.5 - 5.1 mmol/L    Chloride 101 97 - 108 mmol/L    CO2 33 (H) 21 - 32 mmol/L    Anion gap 3 (L) 5 - 15 mmol/L    Glucose 101 (H) 65 - 100 mg/dL    BUN 16 6 - 20 MG/DL    Creatinine 0.91 0.70 - 1.30 MG/DL    BUN/Creatinine ratio 18 12 - 20      GFR est AA >60 >60 ml/min/1.73m2    GFR est non-AA >60 >60 ml/min/1.73m2    Calcium 8.8 8.5 - 10.1 MG/DL    Bilirubin, total 17.9 (H) 0.2 - 1.0 MG/DL    ALT (SGPT) 680 (H) 12 - 78 U/L    AST (SGOT) 560 (H) 15 - 37 U/L    Alk. phosphatase 133 (H) 45 - 117 U/L    Protein, total 7.1 6.4 - 8.2 g/dL    Albumin 2.3 (L) 3.5 - 5.0 g/dL    Globulin 4.8 (H) 2.0 - 4.0 g/dL    A-G Ratio 0.5 (L) 1.1 - 2.2     LACTIC ACID    Collection Time: 07/10/17  2:01 PM   Result Value Ref Range    Lactic acid 2.6 (HH) 0.4 - 2.0 MMOL/L       MEDICATIONS GIVEN:  Medications   . PHARMACY TO SUBSTITUTE PER PROTOCOL (not administered)   . PHARMACY TO SUBSTITUTE PER PROTOCOL (not administered)   . PHARMACY TO SUBSTITUTE PER PROTOCOL (not administered)   venlafaxine-SR (EFFEXOR-XR) capsule 75 mg (not administered)   0.9% sodium chloride infusion (not administered)   ondansetron (ZOFRAN) injection 4 mg (not administered)   propranolol (INDERAL) tablet 10 mg (not administered)   morphine injection 2 mg (not administered)   acetaminophen (TYLENOL) tablet 650 mg (not administered)   oxyCODONE IR (ROXICODONE) tablet 5 mg (not administered)       IMPRESSION:  1. Jaundice        PLAN:  1. Admit to Dr. Alia Jones    ADMIT NOTE:  3:16 PM  Patient is being admitted to the hospital by Dr. Alia Jones. The results of their tests and reasons for their admission have been discussed with them and/or available family. They convey agreement and understanding for the need to be admitted and for their admission diagnosis. Consultation has been made with the inpatient physician specialist for hospitalization. Attestation Note:  This note is prepared by [facility], acting as Scribe for MD Lora Muñiz MD: The scribe's documentation has been prepared under my direction and personally reviewed by me in its entirety. I confirm that the note above accurately reflects all work, treatment, procedures, and medical decision making performed by me.

## 2017-07-10 NOTE — PROGRESS NOTES
Chief Complaint   Patient presents with   [facility] Follow Up     Pt c/o swelling in lower abdominal that is aching and sometimes sharp and N/V. Started 7/8/2017.

## 2017-07-10 NOTE — CONSULTS
GASTROENTEROLOGY CONSULTATION NOTE                            P. Asia Martin MD  [facility], [address], [facility]  [address]  [phone]  [email or web address]        NAME:  Nolan Miranda   :   1965   MRN:   [id number]   PCP: Salome Manning III, DO  Date/Time:  7/10/2017 4:12 PM    Referring Physician: Sunshine Sabillon MD    Consult Date: 7/10/2017 4:12 PM    Reason for consult: liver failure                   Assessment:   Acute flare of hepatitis B with hepatic decompensation. Probable ascites  Worsening jaundice  Chronic HBV----off meds until last week  HIV---off meds until last week       Active Problems:    HIV (human immunodeficiency virus infection)1991 (7/15/2011)      Viral hepatitis B chronic ([facility] (7/15/2011)      Jaundice (7/10/2017)      Coagulopathy ([address] (7/10/2017)        Plan:   Serial LFT's  Vitamin K  Watch for hepatic encephalopathy---not present now    Abdominal sonogram looking for ascites  Paracentesis if fluid present---rule out SBP  Continue Odefsey    Would stop effexor---can cause jaundice, cholestatic hepatitis         History of Present Illness:  Patient is a 45 y. o. who is seen in consultation at the request of Dr. Tushar Barry. Sent from his office for readmission due to worsening fatigue, more jaundiced, nausea and diffuse abdominal pain. Denies hematemesis or melena. Mental status ok. Very weak. Has been taking the [facility] since discharge.           PMH:  Past Medical History:   Diagnosis Date    Cirrhosis ([address]     Hepatitis B     treated and as of 16 pt states tests are negative:  Dr Valente Obando HIV (human immunodeficiency virus infection) ([facility]     HIV infection ([facility] [address] Dx:     Dr Valente Obando Liver failure [facility]) approx     as of 16 pt denies any problems with liver       PSH:  Past Surgical History:   Procedure Laterality Date    COLONOSCOPY N/A 2016    COLONOSCOPY performed by David Vann MD at [facility] AM[facility]       Allergies:  No Known Allergies      Hospital Medications:  No current facility-administered medications for this encounter. Current Outpatient Prescriptions   Medication Sig    vmablwmjgf-snidzide-xnjjkp ala (ODEFSEY) 405-81-44 mg tab Take 1 Tab by mouth daily. Patient has insurance issue and has not been able to get the med since Feb2018    jvxdpxpwug-uoitnsfk-qxmjcl ala (ODEFSEY) 200-25-25 mg tab Take 1 Tab by mouth daily.  venlafaxine-SR (EFFEXOR-XR) 75 mg capsule Take 75 mg by mouth daily. Patient has insurance issue and has not been able to get the med since Feb2018    MULTIVIT-MIN/FA/LYCOPEN/LUTEIN (CENTRUM SILVER MEN PO) Take 1 Tab by mouth daily.  ranitidine (ZANTAC) 150 mg tablet Take 150 mg by mouth as needed. Patient states not taking since the med does not help his stomach pain       Home Medications:  Prior to Admission Medications   Prescriptions Last Dose Informant Patient Reported? Taking? MULTIVIT-MIN/FA/LYCOPEN/LUTEIN (CENTRUM SILVER MEN PO) Unknown at Unknown time Self Yes No   Sig: Take 1 Tab by mouth daily. ktnustpaxc-ixycyxbo-wxcizv ala (ODEFSEY) 200-25-25 mg tab Unknown at Unknown time  No No   Sig: Take 1 Tab by mouth daily. xlekcwbewj-dwbmtqfg-hpekig ala (ODEFSEY) 200-25-25 mg tab Unknown at Unknown time  No No   Sig: Take 1 Tab by mouth daily. Patient has insurance issue and has not been able to get the med since Feb2018   ranitidine (ZANTAC) 150 mg tablet Unknown at Unknown time Self Yes No   Sig: Take 150 mg by mouth as needed. Patient states not taking since the med does not help his stomach pain   venlafaxine-SR (EFFEXOR-XR) 75 mg capsule Unknown at Unknown time Self Yes No   Sig: Take 75 mg by mouth daily.  Patient has insurance issue and has not been able to get the med since Feb2018      Facility-Administered Medications: None       Social History:  Social History   Substance Use Topics    Smoking status: Current Every Day Smoker     Packs/day: 0.50     Years: 31.00     Types: Cigarettes    Smokeless tobacco: Never Used    Alcohol use No      Comment: quit approx 2008       Family History:  History reviewed. No pertinent family history. Objective:   Patient Vitals for the past 8 hrs:   BP Temp Pulse Resp SpO2 Height Weight   07/10/17 1400 105/64 - 64 11 96 % - -   07/10/17 1345 105/66 - 65 12 97 % - -   07/10/17 1300 106/65 - 68 11 95 % - -   07/10/17 1156 108/72 97.9 °F (36.6 °C) 98 12 96 % 6' (1.829 m) 62.9 kg (138 lb 10.7 oz)             EXAM:  Jaundiced   NEURO-a&o   HEENT-sclera icteric   LUNGS-clear    COR-regular rate and rhythym     ABD-soft , no tenderness, feels like some ascites, but not distended     EXT-no edema     Data Review     Recent Labs      07/10/17   1230   WBC  9.6   HGB  15.0   HCT  42.1   PLT  173         Recent Labs      07/10/17   1230   INR  2.4*   PTP  25.3*   APTT  49.7*        IMAGING RESULTS:   []      I have personally reviewed the actual   []    CXR  []    CT  []     [facility] discussed with:    [x]    Patient   []    Family   []    Nursing   []    Attending    Estelita Gray MD       Addendum:    Results for Hever Correia (MRN [id number]) as of 7/10/2017 16:17   Ref.  Range 7/2/2017 05:13 7/3/2017 05:12 7/10/2017 12:30 7/10/2017 12:41 7/10/2017 14:01   Bilirubin, total Latest Ref Range: 0.2 - 1.0 MG/DL 5.3 (H) 6.5 (H)   17.9 (H)   Bilirubin, direct Latest Ref Range: 0.0 - 0.2 MG/DL  4.6 (H)      Protein, total Latest Ref Range: 6.4 - 8.2 g/dL 6.9 6.8   7.1   Albumin Latest Ref Range: 3.5 - 5.0 g/dL 2.5 (L) 2.3 (L)   2.3 (L)   Globulin Latest Ref Range: 2.0 - 4.0 g/dL 4.4 (H) 4.5 (H)   4.8 (H)   A-G Ratio Latest Ref Range: 1.1 - 2.2   0.6 (L) 0.5 (L)   0.5 (L)   ALT (SGPT) Latest Ref Range: 12 - 78 U/L 884 (H) 917 (H)   680 (H)   AST Latest Ref Range: 15 - 37 U/L 868 (H) 939 (H)   560 (H)

## 2017-07-10 NOTE — PROGRESS NOTES
Returns today after being discharged from [facility] one week ago for liver failure due to relapsing active HBV infection. Both his HIV and HBV infections had been under good control on Odefsey(TAF+emtricitabine+rilpivirine) once daily. He went off meds in January for financial and Medicaid issues and both infections relapsed. Prior to that he had normal LFTs and negative viral loads for both HIV and HBV. He was restarted on Odefsey at discharge and has been taking it daily since. Comes in today for follow up feeling awful. Has N/V, weakness, and increasing jaundice. Very weak. BP ok. ? Some early ascites. No bleeding. + early wrist flap. Needs to come back into hospital for liver failure. To go to ED now. ED MD and triage notified. Also spoke to Dr. Otis Deleon, gastroenterology, who saw him last admission. HBV and HIV genotyping were drawn about a week ago, but results not back. Possibility of resistance of HBV to TAF was raised. He did this in [place] about in 2008 with fulminant liver failure, but responded after about 2 wks to resuming TDF. May not be so kay this time. Will follow in hospital, needs admission by hospitalist.  He agrees. [place] poor. Will continue Tx for HBV with TDF in form of Truvada, and replace rilpivirine with raltegravir for now to keep him on a \"liver friendly\" HAART regimen.

## 2017-07-10 NOTE — ED NOTES
TRANSFER - OUT REPORT:    Verbal report given to THANH Mei (name) on vIana Cox  being transferred to [facility](unit) for routine progression of care       Report consisted of patients Situation, Background, Assessment and   Recommendations(SBAR). Information from the following report(s) SBAR, Kardex and ED Summary was reviewed with the receiving nurse. Lines:   Peripheral IV 06/29/17 Right Antecubital (Active)       Peripheral IV 07/10/17 Left Forearm (Active)   Site Assessment Clean, dry, & intact 7/10/2017  6:46 PM   Phlebitis Assessment 0 7/10/2017  6:46 PM   Infiltration Assessment 0 7/10/2017  6:46 PM   Dressing Status Clean, dry, & intact 7/10/2017  6:46 PM   Hub Color/Line Status Pink 7/10/2017  6:46 PM   Alcohol Cap Used No 7/10/2017  6:46 PM        Opportunity for questions and clarification was provided.       Patient transported with:   [email or web address]

## 2017-07-10 NOTE — PROGRESS NOTES
INFECTIOUS DISEASES    Returned to my office today after being discharged from [facility] one week ago for liver failure due to relapsing active HBV infection. Both his HIV and HBV infections had been under good control on Odefsey(TAF+emtricitabine+rilpivirine) once daily. He went off meds in January for financial and Medicaid issues and both infections relapsed. Prior to that he had normal LFTs and negative viral loads for both HIV and HBV. He was restarted on Odefsey at discharge and has been taking it daily since.      Comes in today for follow up feeling awful. Has N/V, weakness, and increasing jaundice. Very weak. BP ok. ? Some early ascites. No bleeding. + early wrist flap.     Needs to come back into hospital for liver failure. To go to ED now. ED MD and triage notified. Also spoke to Dr. Irma Bhardwaj, gastroenterology, who saw him last admission.      HBV and HIV genotyping were drawn about a week ago, but results not back. Possibility of resistance of HBV to TAF was raised.      He did this in [place] about in 2008 with fulminant liver failure, but responded after about 2 wks to resuming TDF. May not be so kay this time.     Will follow in hospital, needs admission by hospitalist.  He agrees. [place] poor.     Will continue Tx for HBV with TDF in form of Truvada, and replace rilpivirine with raltegravir for now to keep him on a \"liver friendly\" HAART regimen.

## 2017-07-10 NOTE — H&P
Hospitalist Admission Note    NAME: Cielo Soni   :  1965   MRN:  [id number]     Date/Time:  7/10/2017 4:16 PM    Patient PCP: Sharyle Irani III, DO  ________________________________________________________________________    My assessment of this patient's clinical condition and my plan of care is as follows. Assessment / Plan:  Jaundice: most likely due to Cirrhosis, had MRCP done on  shows no obstruction, send by PCP to get ERCP, will get GI evaluation, doubt that this case is 2ry to obstruction. Cirrhosis: H/O ETOH abuse and Hepatitis B, will start low dose BB, has hyperbilirubinemia, coagulopathy, hypoalbuminemia, no ascites, no encephalopathy, with this he is in Child C with a poor prognosis. Check AFP. HIV: c/w anti retroviral medication, get Dr. Isaiah Menjivar in the case. Coagulopathy: give Vit K. INR 2.4  GERD: c/w H2 blocker  Code Status: Full Code as per patient  Surrogate Decision Maker: sister Adelina Kamara, brother Lola Martinez [phone]  DVT Prophylaxis: SCD  GI Prophylaxis: on Hs blocker  Baseline: weak and frail lives with brother Mario West does not know form HIV diagnosis and he does not want that to be disclosed to the family        Subjective:   CHIEF COMPLAINT: \"I'm yellow\"    HISTORY OF PRESENT ILLNESS:     Sara Mejia is a 45 y.o.  male  with PMhx significant for HIV, hepatitis B, cirrhosis and liver failure who presents ambulatory to the ED with cc of diffuse abdominal pain and fluid on his abdomen. Pt's pain is diffuse, started 2 days ago, and states it is similar to a previous episode where he had fluid in his abdomen. Pt also c/o nausea, vomiting, rectal bleeding, and jaundice. He states that he took pain medication 2 days ago without relief. He notes that he was taken off of his anti-viral medication because he was denied disability, but has since been placed back on the anti-viral medication since his ED visit on 17.   Pt denies diarrhea, hematochezia, hematuria, fever, and chills. At this time patient is lying in bed is jaundiced, has some abdominal discomfort, has choluria and acholia, denies chest pain ,no SOB, no N/V no diarrhea, no urinary symptoms, no other associated symptoms. We were asked to admit for work up and evaluation of the above problems. Past Medical History:   Diagnosis Date    Cirrhosis ([facility]     Hepatitis B     treated and as of 6/29/16 pt states tests are negative:  Dr Sheela Mendes HIV (human immunodeficiency virus infection) ([facility]     HIV infection ([facility] Dx: Rubén Cover    Dr Sheela Mendes Liver failure [facility]) approx 2008    as of 6/29/16 pt denies any problems with liver        Past Surgical History:   Procedure Laterality Date    COLONOSCOPY N/A 5/18/2016    COLONOSCOPY performed by Avie Shone, MD at hospitals AMBULATORY OR       Social History   Substance Use Topics    Smoking status: Current Every Day Smoker     Packs/day: 0.50     Years: 31.00     Types: Cigarettes    Smokeless tobacco: Never Used    Alcohol use No      Comment: quit approx 2008        Family History   Problem Relation Age of Onset    Dementia Mother     Cancer Father      Throat Cancer    Liver Disease Father      No Known Allergies     Prior to Admission medications    Medication Sig Start Date End Date Taking? Authorizing Provider   emzybuljyu-lvysdozj-dihdqc ala (ODEFSEY) 200-25-25 mg tab Take 1 Tab by mouth daily. Patient has insurance issue and has not been able to get the med since Feb2018 7/5/17   Devonte Quinones MD   xhaxoqdhgd-qzkzbiep-umwwnq ala (ODEFSEY) 200-25-25 mg tab Take 1 Tab by mouth daily. 7/4/17   Nayely Hoover MD   venlafaxine-SR [facility] 75 mg capsule Take 75 mg by mouth daily. Patient has insurance issue and has not been able to get the med since Feb2018    Historical Provider   MULTIVIT-MIN/FA/LYCOPEN/LUTEIN (CENTRUM SILVER MEN PO) Take 1 Tab by mouth daily.     Historical Provider   ranitidine (ZANTAC) 150 mg tablet Take 150 mg by mouth as needed. Patient states not taking since the med does not help his stomach pain 6/27/12   Vale Yates MD       REVIEW OF SYSTEMS:     I am not able to complete the review of systems because: The patient is intubated and sedated    The patient has altered mental status due to his acute medical problems    The patient has baseline aphasia from prior stroke(s)    The patient has baseline dementia and is not reliable historian    The patient is in acute medical distress and unable to provide information           Total of 12 systems reviewed as follows:       POSITIVE= underlined text  Negative = text not underlined  General:  fever, chills, sweats, generalized weakness, weight loss/gain,      loss of appetite   Eyes:    blurred vision, eye pain, loss of vision, double vision  ENT:    rhinorrhea, pharyngitis   Respiratory:   cough, sputum production, SOB, FAIR, wheezing, pleuritic pain   Cardiology:   chest pain, palpitations, orthopnea, PND, edema, syncope   Gastrointestinal:  abdominal pain , N/V, diarrhea, dysphagia, constipation, bleeding   Genitourinary:  frequency, urgency, dysuria, hematuria, incontinence   Muskuloskeletal :  arthralgia, myalgia, back pain  Hematology:  easy bruising, nose or gum bleeding, lymphadenopathy   Dermatological: rash, ulceration, pruritis, color change / jaundice  Endocrine:   hot flashes or polydipsia   Neurological:  headache, dizziness, confusion, focal weakness, paresthesia,     Speech difficulties, memory loss, gait difficulty  Psychological: Feelings of anxiety, depression, agitation    Objective:   VITALS:    Visit Vitals    /64    Pulse 64    Temp 97.9 °F (36.6 °C)    Resp 11    Ht 6' (1.829 m)    Wt 62.9 kg (138 lb 10.7 oz)    SpO2 96%    BMI 18.81 kg/m2       PHYSICAL EXAM:    General:    Alert, cooperative, no distress, appears stated age.      HEENT: Atraumatic, jaundiced sclerae, pink conjunctivae     No oral ulcers, mucosa moist, throat clear, dentition poro  Neck:  Supple, symmetrical,  thyroid: non tender  Lungs:   Coarse BS. No Wheezing or Rhonchi. No rales. Chest wall:  No tenderness  No Accessory muscle use. Heart:   Regular  rhythm,  No  murmur   No edema  Abdomen:   Soft, non-tender. Not distended. Bowel sounds normal  Extremities: No cyanosis. No clubbing,      Skin turgor normal, Capillary refill normal, Radial pulse 2+  Skin:     Not pale. + Jaundiced  No rashes   Psych:  Good insight. Not depressed. Not anxious or agitated. Neurologic: EOMs intact. No facial asymmetry. No aphasia or slurred speech. Symmetrical strength, Sensation grossly intact. Alert and oriented X 4.     _______________________________________________________________________  Care Plan discussed with:    Comments   Patient y    Family      RN y    Care Manager                    Consultant:  lissy FIORE   _______________________________________________________________________  Expected  Disposition:   Home with Family    HH/PT/OT/RN y   SNF/LTC    ERICK    ________________________________________________________________________  TOTAL TIME:  61 Minutes    Critical Care Provided     Minutes non procedure based      Comments    y Reviewed previous records   >50% of visit spent in counseling and coordination of care y Discussion with patient and/or family and questions answered       ________________________________________________________________________  Signed: Marcelle Mckenzie MD    Procedures: see electronic medical records for all procedures/Xrays and details which were not copied into this note but were reviewed prior to creation of Plan.     LAB DATA REVIEWED:    Recent Results (from the past 24 hour(s))   EKG, 12 LEAD, INITIAL    Collection Time: 07/10/17 12:08 PM   Result Value Ref Range    Ventricular Rate 77 BPM    Atrial Rate 77 BPM    P-R Interval 122 ms    QRS Duration 78 ms    Q-T Interval 372 ms    QTC Calculation (Bezet) 420 ms    Calculated P Axis 71 degrees    Calculated R Axis 80 degrees    Calculated T Axis 50 degrees    Diagnosis       Normal sinus rhythm  Normal ECG  No previous ECGs available  Confirmed by Charity Marte ([id number]) on 7/10/2017 3:17:57 PM     CBC WITH AUTOMATED DIFF    Collection Time: 07/10/17 12:30 PM   Result Value Ref Range    WBC 9.6 4.1 - 11.1 K/uL    RBC 5.04 4.10 - 5.70 M/uL    HGB 15.0 12.1 - 17.0 g/dL    HCT 42.1 36.6 - 50.3 %    MCV 83.5 80.0 - 99.0 FL    MCH 29.8 26.0 - 34.0 PG    MCHC 35.6 30.0 - 36.5 g/dL    RDW 16.6 (H) 11.5 - 14.5 %    PLATELET 596 432 - 133 K/uL    NEUTROPHILS 61 32 - 75 %    LYMPHOCYTES 25 12 - 49 %    MONOCYTES 13 5 - 13 %    EOSINOPHILS 1 0 - 7 %    BASOPHILS 0 0 - 1 %    ABS. NEUTROPHILS 5.8 1.8 - 8.0 K/UL    ABS. LYMPHOCYTES 2.4 0.8 - 3.5 K/UL    ABS. MONOCYTES 1.3 (H) 0.0 - 1.0 K/UL    ABS. EOSINOPHILS 0.1 0.0 - 0.4 K/UL    ABS.  BASOPHILS 0.0 0.0 - 0.1 K/UL   AMMONIA    Collection Time: 07/10/17 12:30 PM   Result Value Ref Range    Ammonia 22 <32 UMOL/L   PROTHROMBIN TIME + INR    Collection Time: 07/10/17 12:30 PM   Result Value Ref Range    INR 2.4 (H) 0.9 - 1.1      Prothrombin time 25.3 (H) 9.0 - 11.1 sec   PTT    Collection Time: 07/10/17 12:30 PM   Result Value Ref Range    aPTT 49.7 (H) 22.1 - 32.5 sec    aPTT, therapeutic range     58.0 - 77.0 SECS   LACTIC ACID    Collection Time: 07/10/17 12:41 PM   Result Value Ref Range    Lactic acid 3.6 (HH) 0.4 - 2.0 MMOL/L   METABOLIC PANEL, COMPREHENSIVE    Collection Time: 07/10/17  2:01 PM   Result Value Ref Range    Sodium 137 136 - 145 mmol/L    Potassium 5.0 3.5 - 5.1 mmol/L    Chloride 101 97 - 108 mmol/L    CO2 33 (H) 21 - 32 mmol/L    Anion gap 3 (L) 5 - 15 mmol/L    Glucose 101 (H) 65 - 100 mg/dL    BUN 16 6 - 20 MG/DL    Creatinine 0.91 0.70 - 1.30 MG/DL    BUN/Creatinine ratio 18 12 - 20      GFR est AA >60 >60 ml/min/1.73m2    GFR est non-AA >60 >60 ml/min/1.73m2    Calcium 8.8 8.5 - 10.1 MG/DL    Bilirubin, total 17.9 (H) 0.2 - 1.0 MG/DL ALT (SGPT) 680 (H) 12 - 78 U/L    AST (SGOT) 560 (H) 15 - 37 U/L    Alk.  phosphatase 133 (H) 45 - 117 U/L    Protein, total 7.1 6.4 - 8.2 g/dL    Albumin 2.3 (L) 3.5 - 5.0 g/dL    Globulin 4.8 (H) 2.0 - 4.0 g/dL    A-G Ratio 0.5 (L) 1.1 - 2.2     LACTIC ACID    Collection Time: 07/10/17  2:01 PM   Result Value Ref Range    Lactic acid 2.6 (HH) 0.4 - 2.0 MMOL/L

## 2017-07-11 NOTE — PROGRESS NOTES
* No surgery found *  Bedside shift change report given to [address] (oncoming nurse) by Kecia Maria (offgoing nurse). Report included the following information [facility].     [facility] Phone:   [phone]        Significant changes during shift:  none            Patient Information     Iraj Flores  45 y.o.  7/10/2017 11:57 AM by Antoinette Vegas MD. 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    HIV (human immunodeficiency virus infection)1991 07/15/2011    Liver diseasesecondary to HIV 07/15/2011    Viral hepatitis B chronic ([facility] 07/15/2011           Past Medical History:   Diagnosis Date    Cir[facility]osis ([facility]      Hepatitis B       treated and as of 6/29/16 pt states tests are negative:  Dr Mary Ordaz HIV (human immunodeficiency virus infection) ([facility]      HIV infection ([facility] Dx: Trupti Ordaz Liver failure ([facility] approx 2008     as of 6/29/16 pt denies any problems with liver            Core Measures:     CVA: No No  CHF:No No  PNA:No No        Activity Status:     OOB to Chair No  Ambulated this shift Yes   Bed Rest No     Supplemental O2: (If Applicable)     NC No  NRB No  Venti-mask No        LINES AND DRAINS:     No lines or drains except peripheral IV     DVT prophylaxis:     DVT prophylaxis Med- No  DVT prophylaxis SCD or LAMONT- Refused      Wounds: (If Applicable)     Wounds- No        Patient Safety:     Falls Score Total Score: 1  Safety Level_______  Bed Alarm On? No  Sitter?  No     Plan for upcoming shift: continue to monitor            Discharge Plan: No      Active Consults:  IP CONSULT TO GASTROENTEROLOGY  IP CONSULT TO INFECTIOUS DISEASES

## 2017-07-11 NOTE — PROGRESS NOTES
TRANSFER - IN REPORT:    Verbal report received from Sofi [place] RN(name) on Gaile Members  being received from ER(unit) for routine progression of care      Report consisted of patients Situation, Background, Assessment and   Recommendations(SBAR). Information from the following report(s) SBAR, Kardex, Recent Results, Med Rec Status and Cardiac Rhythm SR was reviewed with the receiving nurse. Opportunity for questions and clarification was provided. Assessment completed upon patients arrival to unit and care assumed.

## 2017-07-11 NOTE — PROGRESS NOTES
Problem: Falls - Risk of  Goal: *Absence of falls  Outcome: Progressing Towards Goal  Pt up ad karissa to BR. Appears to have generalized weakness, but denies any problems walking. Denies dizziness, lightheadedness when up.

## 2017-07-11 NOTE — PROGRESS NOTES
Received letter from [employer] that pt was approved for [facility] AND [facility] medication assistance. Letter, [employer] application, and other original documents were sent to be scanned to pt's chart. It is noted that pt was readmitted to [facility] yesterday for jaundice.

## 2017-07-11 NOTE — PROGRESS NOTES
Pt is a 45 y.o  male admitted with Jaundice. Pt was alert, oriented and in no distress resting in bed. Demographic information verified and all is correct. Pt is a readmit and was recently discharged from [facility] on 7/3/17. Pt had a f/u appointment with Dr. Slick Espinoza and went to it yesterday and then was admitted to the hospital. Pt lives with his brother in a 1 story home with 2 steps to the entrance. Pt has a cane at home and uses it at times. Prior to admission, pt was independent with his ADL's, IADL's and drives. Denies having home health and rehab in the past. Preferred [facility] on [address]. Pt has SNAP for food stamps and verbalized that he had Medicaid and Medicare but was dropped in Jan 2017. Pt was trying to get disability. [employer] met with pt at last admission and gave pt the [facility] care card application. CM asked about the application and pt has not completed it yet. CM will f/u with [employer] regarding the referral to the disability team. CM will continue to follow pt for discharge planning needs. Care Management Interventions  PCP Verified by CM: Yes (Dr. Claudeen Hung -)  Mode of Transport at Discharge: Other (see comment) (pt's brother can transport by car)  Transition of Care Consult (CM Consult): Discharge Planning  Discharge Durable Medical Equipment: No (cane)  Physical Therapy Consult: Yes  Occupational Therapy Consult: Yes  Speech Therapy Consult: No  Current Support Network:  Other, Own Home (lives with brother in a 1 story home with 2 steps to the entrance)  Confirm Follow Up Transport: Family  Discharge Location  Discharge Placement: [id number] [place], [address] [facility]

## 2017-07-11 NOTE — PROGRESS NOTES
ID Progress Note    Subjective:   Daily Progress Note: 2017 2:28 PM    DX:    1. Relapsing HBV infection secondary to going off meds. Back on TDF for one week, but readmitted with worsening liver failure. 2. HIV infection.       3. Anorectal Carcinoma .           Review of Systems: Afebrile. Feels a bit better with less vomiting. Still weak, anorexic, jaundiced, and nauseated  cough. +abdominal discomfort. No joint inflammation or swelling. No headache or AMS. ROS otherwise negative. Objective:     Visit Vitals    BP 99/64    Pulse 61    Temp 97.8 °F (36.6 °C)    Resp 16    Ht 6' (1.829 m)    Wt 138 lb 10.7 oz (62.9 kg)    SpO2 93%    BMI 18.81 kg/m2      O2 Device: Room air    Temp (24hrs), Av.9 °F (36.6 °C), Min:97.8 °F (36.6 °C), Max:98.1 °F (36.7 °C)  General: Afebrile and not toxic. No exanthem or enanthem. No peripheral adenopathy. Alert. All IV sites are clean and dry. Jaundiced. HEENT: EOMI. +icteric sclerae. Oral mucosa normal. Conjunctivae normal. Neck supple. Chest: Lungs clear to A&P. Regular rhythm without murmurs  Abdomen: Soft without organomegaly, tenderness, or masses. Mildly distended. ? ascites. Musculoskeletal: No joint inflammation or effusions. No edema. Neurologic: Nonfocal exam. No liver flap. Data Review:  Ammonia <10. Bili = 15.3. ALT/AST= 543/439. AP = 114. Creat = 0.69. INR = 2.7.         Recent Results (from the past 24 hour(s))   URINALYSIS W/ REFLEX CULTURE    Collection Time: 07/10/17  4:56 PM   Result Value Ref Range    Color DARK YELLOW      Appearance CLOUDY (A) CLEAR      Specific gravity 1.024 1.003 - 1.030      pH (UA) 6.0 5.0 - 8.0      Protein NEGATIVE  NEG mg/dL    Glucose NEGATIVE  NEG mg/dL    Ketone TRACE (A) NEG mg/dL    Blood NEGATIVE  NEG      Urobilinogen 1.0 0.2 - 1.0 EU/dL    Nitrites NEGATIVE  NEG      Leukocyte Esterase SMALL (A) NEG      WBC 5-10 0 - 4 /hpf    RBC 0-5 0 - 5 /hpf    Epithelial cells FEW FEW /lpf Bacteria NEGATIVE  NEG /hpf    UA:UC IF INDICATED URINE CULTURE ORDERED (A) CNI     BILIRUBIN, CONFIRM    Collection Time: 07/10/17  4:56 PM   Result Value Ref Range    Bilirubin UA, confirm POSITIVE (A) NEG     AMMONIA    Collection Time: 07/10/17  5:20 PM   Result Value Ref Range    Ammonia <10 <32 UMOL/L   CEA    Collection Time: 07/10/17  5:20 PM   Result Value Ref Range    CEA 2.6 ng/mL   LIPASE    Collection Time: 07/10/17  5:20 PM   Result Value Ref Range    Lipase 157 73 - 393 U/L   LACTIC ACID    Collection Time: 07/10/17  6:53 PM   Result Value Ref Range    Lactic acid 2.5 (HH) 0.4 - 2.0 MMOL/L   CBC WITH AUTOMATED DIFF    Collection Time: 07/11/17  1:34 AM   Result Value Ref Range    WBC 6.9 4.1 - 11.1 K/uL    RBC 4.55 4.10 - 5.70 M/uL    HGB 13.1 12.1 - 17.0 g/dL    HCT 38.3 36.6 - 50.3 %    MCV 84.2 80.0 - 99.0 FL    MCH 28.8 26.0 - 34.0 PG    MCHC 34.2 30.0 - 36.5 g/dL    RDW 16.7 (H) 11.5 - 14.5 %    PLATELET 382 (L) 726 - 400 K/uL    NEUTROPHILS 44 32 - 75 %    LYMPHOCYTES 39 12 - 49 %    MONOCYTES 14 (H) 5 - 13 %    EOSINOPHILS 3 0 - 7 %    BASOPHILS 0 0 - 1 %    ABS. NEUTROPHILS 3.1 1.8 - 8.0 K/UL    ABS. LYMPHOCYTES 2.7 0.8 - 3.5 K/UL    ABS. MONOCYTES 1.0 0.0 - 1.0 K/UL    ABS. EOSINOPHILS 0.2 0.0 - 0.4 K/UL    ABS.  BASOPHILS 0.0 0.0 - 0.1 K/UL   MAGNESIUM    Collection Time: 07/11/17  1:34 AM   Result Value Ref Range    Magnesium 2.0 1.6 - 2.4 mg/dL   METABOLIC PANEL, COMPREHENSIVE    Collection Time: 07/11/17  1:34 AM   Result Value Ref Range    Sodium 138 136 - 145 mmol/L    Potassium 4.2 3.5 - 5.1 mmol/L    Chloride 103 97 - 108 mmol/L    CO2 31 21 - 32 mmol/L    Anion gap 4 (L) 5 - 15 mmol/L    Glucose 99 65 - 100 mg/dL    BUN 13 6 - 20 MG/DL    Creatinine 0.69 (L) 0.70 - 1.30 MG/DL    BUN/Creatinine ratio 19 12 - 20      GFR est AA >60 >60 ml/min/1.73m2    GFR est non-AA >60 >60 ml/min/1.73m2    Calcium 8.3 (L) 8.5 - 10.1 MG/DL    Bilirubin, total 15.3 (H) 0.2 - 1.0 MG/DL    ALT (SGPT) 543 (H) 12 - 78 U/L    AST (SGOT) 439 (H) 15 - 37 U/L    Alk. phosphatase 114 45 - 117 U/L    Protein, total 6.1 (L) 6.4 - 8.2 g/dL    Albumin 2.0 (L) 3.5 - 5.0 g/dL    Globulin 4.1 (H) 2.0 - 4.0 g/dL    A-G Ratio 0.5 (L) 1.1 - 2.2     PROTHROMBIN TIME + INR    Collection Time: 07/11/17  1:34 AM   Result Value Ref Range    INR 2.7 (H) 0.9 - 1.1      Prothrombin time 27.7 (H) 9.0 - 11.1 sec   LACTIC ACID    Collection Time: 07/11/17  1:34 AM   Result Value Ref Range    Lactic acid 1.9 0.4 - 2.0 MMOL/L   TSH 3RD GENERATION    Collection Time: 07/11/17  1:34 AM   Result Value Ref Range    TSH 0.98 0.36 - 3.74 uIU/mL         Assessment/Plan:    Liver failure due to relapse of chronic HBV infection following self-discontinuation of meds. Has been back on meds for only a week, and may be too soon for effect, if that happens. Resistance profiles still pending from last admission. Continue HAART with TDF in regimen as the active HBV drug.

## 2017-07-11 NOTE — ROUTINE PROCESS
Chart reviewed prior to receiving bedside report. On call nurse called in and will be assigned to this patient. I am being pulled to another unit.

## 2017-07-11 NOTE — ROUTINE PROCESS
* No surgery found *  Bedside shift change report given to Srinivas Norton (oncoming nurse) by Pavel Figueroa (offgoing nurse). Report included the following information [facility]. [facility] Phone:   [phone]      Significant changes during shift:  Admission to unit        Patient Information    David Hartman  45 y.o.  7/10/2017 11:57 AM by Nate Romero MD. 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    HIV (human immunodeficiency virus infection)1991 07/15/2011    Liver diseasesecondary to HIV 07/15/2011    Viral hepatitis B chronic ([address] 07/15/2011     Past Medical History:   Diagnosis Date    Cirrhosis ([address]     Hepatitis B     treated and as of 6/29/16 pt states tests are negative:  Dr Simi Rowe    [facility]     HIV infection ([address] Dx: 1991    Dr Keerthi Gonsalves Liver failure ([facility] approx 2008    as of 6/29/16 pt denies any problems with liver         Core Measures:    CVA: No No  CHF:No No  PNA:No No      Activity Status:    OOB to Chair No  Ambulated this shift Yes   Bed Rest No    Supplemental O2: (If Applicable)    NC No  NRB No  Venti-mask No      LINES AND DRAINS:    No lines or drains except peripheral IV    DVT prophylaxis:    DVT prophylaxis Med- No  DVT prophylaxis SCD or LAMONT- Refused     Wounds: (If Applicable)    Wounds- No      Patient Safety:    Falls Score Total Score: 1  Safety Level_______  Bed Alarm On? No  Sitter?  No    Plan for upcoming shift: PT/OT        Discharge Plan: No     Active Consults:  IP CONSULT TO GASTROENTEROLOGY  IP CONSULT TO INFECTIOUS DISEASES

## 2017-07-11 NOTE — PROGRESS NOTES
Spoke to DR Mendoza Reyes because pt has inderal order and blood pressure is 102/62, MD stated go ahead and give it .

## 2017-07-11 NOTE — CDMP QUERY
Dr. Shiv Romano :    Patient is noted to have a BMI of 18.81. Please clarify if this patient is:     =>Underweight  =>Cachexia  =>Failure to Thrive  =>Other explanation of clinical findings  =>Unable to determine (no explanation for clinical findings)    Presentation: 45 WM admitted with jaundice, cirrhosis with HIV and probable ascites, 138 lbs = BMI 18.81    Please clarify and document your clinical opinion in the progress notes and discharge summary, including the definitive and or presumptive diagnosis, (suspected or probable), related to the above clinical findings. Please include clinical findings supporting your diagnosis. Thank [email or web address]  [phone]

## 2017-07-11 NOTE — PROGRESS NOTES
Hospitalist Progress Note    NAME: Joe Mariee   :  1965   MRN:  [id number]       Assessment / Plan:  Relapsing HBV infection in settings of cirrhosis   + jaundice, LFTs improving  hyperbilirubinemia, coagulopathy, hypoalbuminemia, no ascites  Monitor for encephalopathy   GI help was greatly appreciated   On IV Acetadote per GI recommendation   Started on BB this admission, watch for tolerance   Diet: per GI, on CLD   MRCP done on  shows no obstruction  US 7/10: Abnormal liver possible cirrhosis. Bone alignment splenomegaly,  distended gallbladder with sludge. Small amount of ascites. HIV  Dr Nadeem Salazar help appreciated   c/w anti retroviral medication    Underweight, BMI 18.81   Coagulopathy, s/p Vit K  Thrombocytopenia, stable, due to cirrhosis   GERD, c/w home H2 blocker          Code Status: Full Code   Surrogate Decision Maker: sister Jayla Sepulveda, brother Jordan Paci [phone]  Family does not know form HIV diagnosis and he does not want that to be disclosed to the family  DVT Prophylaxis: SCD ( coagulopathy)     Baseline: weak and frail lives with brother Jordan Paci  Recommended Disposition: Home w/Family once OK with GI      Subjective:     Chief Complaint / Reason for Physician Visit: following elevated LFTs / HIV/ coagulopathy   Nausea: mild, no vomiting, tolerating PO       Discussed with RN events overnight. Review of Systems:  Symptom Y/N Comments  Symptom Y/N Comments   Fever/Chills n   Chest Pain n    Poor Appetite    Edema     Cough    Abdominal Pain n    Sputum    Joint Pain     SOB/FAIR n   Pruritis/Rash     Nausea/vomit n   Tolerating PT/OT     Diarrhea n   Tolerating Diet     Constipation n   Other       Could NOT obtain due to:      Objective:     VITALS:   Last 24hrs VS reviewed since prior progress note.  Most recent are:  Patient Vitals for the past 24 hrs:   Temp Pulse Resp BP SpO2   17 1537 97.8 °F (36.6 °C) 64 17 102/58 94 %   17 1133 97.8 °F (36.6 °C) 61 16 99/64 93 % 07/11/17 0730 97.9 °F (36.6 °C) (!) 56 16 99/60 94 %   07/11/17 0421 98 °F (36.7 °C) 64 16 91/54 92 %   07/11/17 0017 98.1 °F (36.7 °C) 60 16 96/61 95 %   07/10/17 2103 97.9 °F (36.6 °C) 71 16 109/76 97 %       Intake/Output Summary (Last 24 hours) at 07/11/17 1807  Last data filed at 07/10/17 2254   Gross per 24 hour   Intake           143.75 ml   Output                0 ml   Net           143.75 ml        PHYSICAL EXAM:  General: WD, WN. Alert, cooperative, no acute distress    EENT:  EOMI. Icteric sclerae. MMM  Resp:  CTA bilaterally, no wheezing or rales. No accessory muscle use  CV:  Regular  rhythm,  No edema  GI:  Soft, Non distended, Non tender.  +Bowel sounds  Neurologic:  Alert and oriented X 3, normal speech,   Psych:   Good insight. Not anxious nor agitated  Skin:  No rashes. + jaundice    Reviewed most current lab test results and cultures  YES  Reviewed most current radiology test results   YES  Review and summation of old records today    NO  Reviewed patient's current orders and MAR    YES  PMH/SH reviewed - no change compared to H&P  ________________________________________________________________________  Care Plan discussed with:    Comments   Patient y    Family      RN y    Care Manager     Consultant                        Multidiciplinary team rounds were held today with , nursing, pharmacist and clinical coordinator. Patient's plan of care was discussed; medications were reviewed and discharge planning was addressed.      ________________________________________________________________________  Total NON critical care TIME:  25  Minutes    Total CRITICAL CARE TIME Spent:   Minutes non procedure based      Comments   >50% of visit spent in counseling and coordination of care     ________________________________________________________________________  Jules Perry MD     Procedures: see electronic medical records for all procedures/Xrays and details which were not copied into this note but were reviewed prior to creation of Plan. LABS:  I reviewed today's most current labs and imaging studies.   Pertinent labs include:  Recent Labs      07/11/17   0134  07/10/17   1230   WBC  6.9  9.6   HGB  13.1  15.0   HCT  38.3  42.1   PLT  141*  173     Recent Labs      07/11/17   0134  07/10/17   1401  07/10/17   1230   NA  138  137   --    K  4.2  5.0   --    CL  103  101   --    CO2  31  33*   --    GLU  99  101*   --    BUN  13  16   --    CREA  0.69*  0.91   --    CA  8.3*  8.8   --    MG  2.0   --    --    ALB  2.0*  2.3*   --    TBILI  15.3*  17.9*   --    SGOT  439*  560*   --    ALT  543*  680*   --    INR  2.7*   --   2.4*       Signed: Jasvir Salas MD

## 2017-07-11 NOTE — PROGRESS NOTES
Occupational Therapy  Chart reviewed. Met with pt, introduced self and role of OT. Noted per PT, pt is up ad karissa, at his baseline for mobility. Pt denied any difficulties with performing ADLs and denied need for acute OT. Will sign off.  Mahesh Chatman MS, OTR/L

## 2017-07-11 NOTE — PROGRESS NOTES
GI PROGRESS NOTE    NAME:             Hoa Craig   :              1965   MRN:              [id number]   Admit Date:     7/10/2017  Todays Date:  2017      Subjective:           Feels a little better. Denies abdominal pain. Medications-reviewed     Current Facility-Administered Medications   Medication Dose Route Frequency    therapeutic multivitamin (THERAGRAN) tablet 1 Tab  1 Tab Oral DAILY    0.9% sodium chloride infusion  75 mL/hr IntraVENous CONTINUOUS    ondansetron (ZOFRAN) injection 4 mg  4 mg IntraVENous Q6H PRN    propranolol (INDERAL) tablet 10 mg  10 mg Oral BID    morphine injection 2 mg  2 mg IntraVENous Q4H PRN    acetaminophen (TYLENOL) tablet 650 mg  650 mg Oral Q6H PRN    oxyCODONE IR (ROXICODONE) tablet 5 mg  5 mg Oral Q4H PRN    emtricitabine-tenofovir (TDF) (TRUVADA) 200-300 mg per tablet 1 Tab  1 Tab Oral DAILY    raltegravir (ISENTRESS) tablet 400 mg  400 mg Oral BID        Objective:   Patient Vitals for the past 8 hrs:   BP Temp Pulse Resp SpO2 Weight   17 1133 99/64 97.8 °F (36.6 °C) 61 16 93 % -   17 0730 99/60 97.9 °F (36.6 °C) (!) 56 16 94 % -   17 0613 - - - - - 62.9 kg (138 lb 10.7 oz)         1901 -  0700  In: 143.8 [I.V.:143.8]  Out: -     EXAM:  Jaundiced   NEURO-a&o   HEENT-sclera icteric   LUNGS-clear   COR-regular rate and rhythym   ABD-soft , no tenderness, no rebound, good bs      Abdominal sonogram:  IMPRESSION  Impression: Abnormal liver possible cirrhosis. Bone alignment splenomegaly,  distended gallbladder with sludge. Small amount of ascites.      LABS:  Recent Labs      17   0134  07/10/17   1230   WBC  6.9  9.6   HGB  13.1  15.0   HCT  38.3  42.1   PLT  141*  173     Recent Labs      17   0134  07/10/17   1401   NA  138  137   K  4.2  5.0   CL  103  101   CO2  31  33*   BUN  13  16   CREA  0.69*  0.91   GLU  99  101*   CA  8.3*  8.8   MG  2.0   --      Recent Labs      17   0134  07/10/17   3436 07/10/17   1401   SGOT  439*   --   560*   AP  114   --   133*   TBILI  15.3*   --   17.9*   TP  6.1*   --   7.1   ALB  2.0*   --   2.3*   GLOB  4.1*   --   4.8*   LPSE   --   157   --    ALT  543*   --   680*     Recent Labs      07/11/17   0134  07/10/17   1230   INR  2.7*  2.4*   PTP  27.7*  25.3*   APTT   --   49.7*      No results for input(s): FE, TIBC, PSAT, FERR in the last 72 hours. No results found for: FOL, RBCF                         Assessment:   Acute flare of hepatitis B with hepatic decompensation. LFT's are coming down. Hope that he has turned the corner. Small amount ascites  Worsening jaundice----improving  Chronic HBV----off meds until last week  HIV---off meds until last week         Active Problems:    HIV (human immunodeficiency virus infection)1991 (7/15/2011)      Viral hepatitis B chronic ([facility] [address] (7/15/2011)      Jaundice (7/10/2017)      Coagulopathy ([facility] [address] (7/10/2017)        Plan:   Serial LFT's  Vitamin K  Watch for hepatic encephalopathy---not present now    Continue Jenn          Addendum---Spoke with Dr. Jersey Giron who will follow remotely from [facility]. Advised giving two more doses of Vitamin K 10 mg IV and starting Acetadote which will be continued as a continuous infusion even after the loading dose.

## 2017-07-11 NOTE — PROGRESS NOTES
physical Therapy EVALUATION/DISCHARGE  Patient: Ovi Millan (42 y.o. male)  Date: 7/11/2017  Primary Diagnosis: Jaundice        Precautions:      ASSESSMENT :  Based on the objective data described below, the patient presents with baseline mobility. PTA pt was living with his brother and independent with mobility. He has a SPC that he was using for balance when needed. Last admission he was walking the halls daily on [facility]. He was received in supine and cleared by nursing to mobilize. Performed all mobility independently. Ambulated 400ft without AD, very stable/steady gait. Encouraged to mobilize in the halls. Returned to supine. Will sign off. Skilled physical therapy is not indicated at this time. PLAN :  Discharge Recommendations: None  Further Equipment Recommendations for Discharge: none     SUBJECTIVE:   Patient stated I walked the halls down stairs.     OBJECTIVE DATA SUMMARY:   HISTORY:    Past Medical History:   Diagnosis Date    Cirrhosis ([facility] [address]     Hepatitis B     treated and as of 6/29/16 pt states tests are negative:  Dr Ruddy Garcia HIV (human immunodeficiency virus infection) ([facility] [address]     HIV infection ([facility] [address] Dx: 1991    Dr Ruddy Garcia Liver failure [facility]) approx 2008    as of 6/29/16 pt denies any problems with liver     Past Surgical History:   Procedure Laterality Date    COLONOSCOPY N/A 5/18/2016    COLONOSCOPY performed by Derotha Homans, MD at [facility]     Prior Level of Function/Home Situation: independent with mobility, living with his brother. Very fatigued at home due to medical condition.    Personal factors and/or comorbidities impacting plan of care:     Home Situation  Home Environment: Private residence  # Steps to Enter: 2  Rails to Enter: Yes  One/Two Story Residence: One story  Living Alone: No  Support Systems: Family member(s) (brother)  Patient Expects to be Discharged to[de-identified] Private residence  Current DME Used/Available at Home: Gabby Russo straight    EXAMINATION/PRESENTATION/DECISION MAKING:   Critical Behavior:   alert and oriented x 4           Hearing: Auditory  Auditory Impairment: None  Skin:  jaundice   Edema: WDL  Range Of Motion:  AROM: Within functional limits           PROM: Within functional limits           Strength:    Strength: Generally decreased, functional                    Tone & Sensation:   Tone: Normal              Sensation: Intact               Coordination:  Coordination: Within functional limits  Vision:      Functional Mobility:  Bed Mobility:  Rolling: Independent  Supine to Sit: Independent  Sit to Supine: Independent     Transfers:  Sit to Stand: Independent  Stand to Sit: Independent                       Balance:   Sitting: Intact  Standing: Intact  Ambulation/Gait Training:  Distance (ft): 400 Feet (ft)  Assistive Device:  (none)  Ambulation - Level of Assistance: Independent                                       Functional Measure:  Barthel Index:    Bathin  Bladder: 10  Bowels: 10  Groomin  Dressing: 10  Feeding: 10  Mobility: 15  Stairs: 10  Toilet Use: 10  Transfer (Bed to Chair and Back): 15  Total: 100       Barthel and G-code impairment scale:  Percentage of impairment CH  0% CI  1-19% CJ  20-39% CK  40-59% CL  60-79% CM  80-99% CN  100%   Barthel Score 0-100 100 99-80 79-60 59-40 20-39 1-19   0   Barthel Score 0-20 20 17-19 13-16 9-12 5-8 1-4 0      The Barthel ADL Index: Guidelines  1. The index should be used as a record of what a patient does, not as a record of what a patient could do. 2. The main aim is to establish degree of independence from any help, physical or verbal, however minor and for whatever reason. 3. The need for supervision renders the patient not independent. 4. A patient's performance should be established using the best available evidence. Asking the patient, friends/relatives and nurses are the usual sources, but direct observation and common sense are also important. However direct testing is not needed. 5. Usually the patient's performance over the preceding 24-48 hours is important, but occasionally longer periods will be relevant. 6. Middle categories imply that the patient supplies over 50 per cent of the effort. 7. Use of aids to be independent is allowed. Curtis Perea., Barthel, D.W. (7789). Functional evaluation: the Barthel Index. [facility] (14)2. JASPER Fritz, Lostine Ritu., Sushil De Los Santos., [place], [address] (1999). Measuring the change indisability after inpatient rehabilitation; comparison of the responsiveness of the Barthel Index and Functional [place] Measure. Journal of Neurology, Neurosurgery, and Psychiatry, 66(4), 537-463. Vidhya Claire, [place]JAMES, TONYA Barrow, & Kvng Philippe M.A. (2004.) Assessment of post-stroke quality of life in cost-effectiveness studies: The usefulness of the Barthel Index and the EuroQoL-5D. Quality of Life Research, 13, 253-06         G codes: In compliance with CMSs Claims Based Outcome Reporting, the following G-code set was chosen for this patient based on their primary functional limitation being treated: The outcome measure chosen to determine the severity of the functional limitation was the barthel with a score of 100/100 which was correlated with the impairment scale.     ? Mobility - Walking and Moving Around:     - CURRENT STATUS: CH - 0% impaired, limited or restricted    - GOAL STATUS: CH - 0% impaired, limited or restricted    - D/C STATUS:  CH - 0% impaired, limited or restricted          Physical Therapy Evaluation Charge Determination   History Examination Presentation Decision-Making   HIGH Complexity :3+ comorbidities / personal factors will impact the outcome/ POC  LOW Complexity : 1-2 Standardized tests and measures addressing body structure, function, activity limitation and / or participation in recreation  LOW Complexity : Stable, uncomplicated  LOW Complexity : FOTO score of       Based on the above components, the patient evaluation is determined to be of the following complexity level: LOW     Pain:         Activity Tolerance:   VSS  Please refer to the flowsheet for vital signs taken during this treatment. After treatment:   []   Patient left in no apparent distress sitting up in chair  [x]   Patient left in no apparent distress in bed  [x]   Call bell left within reach  [x]   Nursing notified  []   Caregiver present  []   Bed alarm activated    COMMUNICATION/EDUCATION:   Communication/Collaboration:  [x]   Fall prevention education was provided and the patient/caregiver indicated understanding. [x]   Patient/family have participated as able and agree with findings and recommendations. []   Patient is unable to participate in plan of care at this time.   Findings and recommendations were discussed with: Registered Nurse and     Thank you for this referral.  Diego John, PT, DPT   Time Calculation: 19 mins

## 2017-07-12 NOTE — PROGRESS NOTES
Received call from Rosemarie Barriga with the [facility] requesting pt's demographic sheet. CM faxed it to Rosemarie Barriga at [facility] ([phone]).     Latrice De Dios, [phone]

## 2017-07-12 NOTE — PROGRESS NOTES
Hospitalist Progress Note    NAME: Lise Nagel   :  1965   MRN:  [id number]       Assessment / Plan:  Relapsing HBV infection in settings of cirrhosis   + jaundice, LFTs stable   hyperbilirubinemia, coagulopathy, hypoalbuminemia, no ascites  Monitor for encephalopathy - none at present   GI help was greatly appreciated: cont IV Acetadote, may need to be transferred to the liver transplant center if deteriorating   Started on BB this admission but BP borderline low 90th, was DC   Diet: per GI, on CLD   MRCP done on  shows no obstruction  US 7/10: Abnormal liver possible cirrhosis. Bone alignment splenomegaly,  distended gallbladder with sludge. Small amount of ascites. Addendum: per GI recommendations pt need to be transferred to [facility] in case he will need liver transplant. Called [facility] transfer center today, awaiting on bed availability. Accepting physician Dr Kate Nelson ( transfer center [phone] )    HIV  Dr Mine Barr help appreciated   c/w anti retroviral medication    Lactic acidosis, POA, resolved   Underweight, BMI 18.81   Coagulopathy, s/p Vit K  Thrombocytopenia, stable, due to cirrhosis   GERD, c/w home H2 blocker          Code Status: Full Code   Surrogate Decision Maker: sister Jacky Pulliam, brother Jess Certain [phone]  Family does not know form HIV diagnosis and he does not want that to be disclosed to the family  DVT Prophylaxis: SCD ( coagulopathy)     Baseline: weak and frail lives with brother Jess Rivero  Recommended Disposition: TBD - per GI      Subjective:     Chief Complaint / Reason for Physician Visit: following elevated LFTs / HIV/ coagulopathy   abd pain: controlled   Nausea: mild, no vomiting, tolerating PO       Discussed with RN events overnight.      Review of Systems:  Symptom Y/N Comments  Symptom Y/N Comments   Fever/Chills n   Chest Pain n    Poor Appetite    Edema     Cough    Abdominal Pain n    Sputum    Joint Pain     SOB/FAIR n   Pruritis/Rash     Nausea/vomit n   Tolerating PT/OT Diarrhea n   Tolerating Diet     Constipation n   Other       Could NOT obtain due to:      Objective:     VITALS:   Last 24hrs VS reviewed since prior progress note. Most recent are:  Patient Vitals for the past 24 hrs:   Temp Pulse Resp BP SpO2   07/12/17 1015 - - - 96/60 -   07/12/17 0724 97.7 °F (36.5 °C) (!) 56 16 99/68 94 %   07/11/17 2230 97.8 °F (36.6 °C) 63 16 108/66 96 %   07/11/17 1841 98.5 °F (36.9 °C) 73 17 102/62 94 %   07/11/17 1537 97.8 °F (36.6 °C) 64 17 102/58 94 %   07/11/17 1133 97.8 °F (36.6 °C) 61 16 99/64 93 %     No intake or output data in the 24 hours ending 07/12/17 1125     PHYSICAL EXAM:  General: WD, WN. Alert, cooperative, no acute distress    EENT:  EOMI. Icteric sclerae. MMM  Resp:  CTA bilaterally, no wheezing or rales. No accessory muscle use  CV:  Regular  rhythm,  No edema  GI:  Soft, Non distended, Non tender.  +Bowel sounds  Neurologic:  Alert and oriented X 3, normal speech,   Psych:   Good insight. Not anxious nor agitated  Skin:  No rashes. + jaundice    Reviewed most current lab test results and cultures  YES  Reviewed most current radiology test results   YES  Review and summation of old records today    NO  Reviewed patient's current orders and MAR    YES  PMH/SH reviewed - no change compared to H&P  ________________________________________________________________________  Care Plan discussed with:    Comments   Patient y    Family      RN y    Care Manager y    Consultant  y GI   Hepatology Dr Molly lFores team rounds were held today with , nursing, pharmacist and clinical coordinator. Patient's plan of care was discussed; medications were reviewed and discharge planning was addressed.      ________________________________________________________________________  Total NON critical care TIME: 45  Minutes    Total CRITICAL CARE TIME Spent:   Minutes non procedure based      Comments   >50% of visit spent in counseling and coordination of care y Coordination of care    ________________________________________________________________________  Lazarus Jesus, MD     Procedures: see electronic medical records for all procedures/Xrays and details which were not copied into this note but were reviewed prior to creation of Plan. LABS:  I reviewed today's most current labs and imaging studies.   Pertinent labs include:  Recent Labs      07/11/17   0134  07/10/17   1230   WBC  6.9  9.6   HGB  13.1  15.0   HCT  38.3  42.1   PLT  141*  173     Recent Labs      07/12/17   0407  07/11/17   0134  07/10/17   1401  07/10/17   1230   NA  133*  138  137   --    K  3.5  4.2  5.0   --    CL  99  103  101   --    CO2  30  31  33*   --    GLU  105*  99  101*   --    BUN  10  13  16   --    CREA  0.65*  0.69*  0.91   --    CA  7.8*  8.3*  8.8   --    MG   --   2.0   --    --    ALB  1.7*  2.0*  2.3*   --    TBILI  16.5*  15.3*  17.9*   --    SGOT  421*  439*  560*   --    ALT  531*  543*  680*   --    INR  3.3*  2.7*   --   2.4*       Signed: Lazarus Jesus, MD

## 2017-07-12 NOTE — PROGRESS NOTES
CM contacted nurse navigator, Nickolas Gonzalez and discussed pt being a readmission.      Audrey Jimenes, [phone]

## 2017-07-12 NOTE — PROGRESS NOTES
ID Progress Note    Subjective:   Daily Progress Note: 2017 2:28 PM    DX:    1. Relapsing HBV infection secondary to going off meds. Back on TDF for one week, but readmitted with worsening liver failure. 2. HIV infection.       3. Anorectal Carcinoma .           Review of Systems: Afebrile. Still weak, anorexic, jaundiced, and nauseated. No AMS. +abdominal discomfort. No joint inflammation or swelling. No headache or AMS. ROS otherwise negative. Objective:     Visit Vitals    /72 (BP 1 Location: Right arm, BP Patient Position: At rest)    Pulse (!) 54    Temp 97.8 °F (36.6 °C)    Resp 16    Ht 6' (1.829 m)    Wt 136 lb 0.4 oz (61.7 kg)    SpO2 93%    BMI 18.45 kg/m2      O2 Device: Room air    Temp (24hrs), Av.9 °F (36.6 °C), Min:97.7 °F (36.5 °C), Max:98.5 °F (36.9 °C)  General: Afebrile and not toxic. No exanthem or enanthem. No peripheral adenopathy. Alert. All IV sites are clean and dry. Jaundiced. HEENT: EOMI. +icteric sclerae. Oral mucosa normal. Conjunctivae normal. Neck supple. Chest: Lungs clear to A&P. Regular rhythm without murmurs  Abdomen: Soft without organomegaly, tenderness, or masses. Mildly distended. ? ascites. Musculoskeletal: No joint inflammation or effusions. No edema. Neurologic: Nonfocal exam. No liver flap. Data Review:  Ammonia <10. Bili = 16.5. ALT/AST= 531/421. AP = 111. Creat = 0.65. INR = 3.3.         Recent Results (from the past 24 hour(s))   METABOLIC PANEL, COMPREHENSIVE    Collection Time: 17  4:07 AM   Result Value Ref Range    Sodium 133 (L) 136 - 145 mmol/L    Potassium 3.5 3.5 - 5.1 mmol/L    Chloride 99 97 - 108 mmol/L    CO2 30 21 - 32 mmol/L    Anion gap 4 (L) 5 - 15 mmol/L    Glucose 105 (H) 65 - 100 mg/dL    BUN 10 6 - 20 MG/DL    Creatinine 0.65 (L) 0.70 - 1.30 MG/DL    BUN/Creatinine ratio 15 12 - 20      GFR est AA >60 >60 ml/min/1.73m2    GFR est non-AA >60 >60 ml/min/1.73m2    Calcium 7.8 (L) 8.5 - 10.1 MG/DL    Bilirubin, total 16.5 (H) 0.2 - 1.0 MG/DL    ALT (SGPT) 531 (H) 12 - 78 U/L    AST (SGOT) 421 (H) 15 - 37 U/L    Alk. phosphatase 111 45 - 117 U/L    Protein, total 6.1 (L) 6.4 - 8.2 g/dL    Albumin 1.7 (L) 3.5 - 5.0 g/dL    Globulin 4.4 (H) 2.0 - 4.0 g/dL    A-G Ratio 0.4 (L) 1.1 - 2.2     PROTHROMBIN TIME + INR    Collection Time: 07/12/17  4:07 AM   Result Value Ref Range    INR 3.3 (H) 0.9 - 1.1      Prothrombin time 34.1 (H) 9.0 - 11.1 sec         Assessment/Plan:    Liver failure due to relapse of chronic HBV infection following self-discontinuation of meds. Has been back on meds for only a week, and may be too soon for effect, if that happens at all. Continue HAART with TDF in regimen as the active HBV drug. Rising INR is ominous. Plans for transfer to transplant center ongoing.

## 2017-07-12 NOTE — ROUTINE PROCESS
* No surgery found *  Bedside shift change report given to Oli Salguero (oncoming nurse) by Nanette Cadena (offgoing nurse). Report included the following information [facility] Phone:   [phone]      Significant changes during shift:  Dr Clarence Kim and DR Yolette Lopez spoke with [facility] regarding transferring patient to transplant center. Waiting to hear from them when they have bed and have accepted patient        Patient Information    Ivana Cox  45 y.o.  7/10/2017 11:57 AM by Tonya Lee MD. Ivana Cox was admitted from Home    Problem List    Patient Active Problem List    Diagnosis Date Noted    Jaundice 07/10/2017    Coagulopathy ([address] 07/10/2017    Acute hepatitis 06/29/2017    Depression 01/27/2017    Tobacco abuse 08/25/2016    Perianal lesion 07/05/2016    Encounter for screening colonoscopy 05/18/2016    Anal condyloma 08/06/2013    HIV (human immunodeficiency virus infection)1991 07/15/2011    Liver diseasesecondary to HIV 07/15/2011    Viral hepatitis B chronic ([address] 07/15/2011     Past Medical History:   Diagnosis Date    Cirrhosis ([address]     Hepatitis B     treated and as of 6/29/16 pt states tests are negative:  Dr Asim Staley HIV (human immunodeficiency virus infection) ([address]     HIV infection ([address] Dx: 1991    Dr Asim Staley Liver failure ([address] approx 2008    as of 6/29/16 pt denies any problems with liver         Core Measures:    CVA: No No  CHF:No No  PNA:No No      Activity Status:    OOB to Chair No  Ambulated this shift Yes   Bed Rest No    Supplemental O2: (If Applicable)    NC No  NRB No  Venti-mask No      LINES AND DRAINS:    No lines or drains except peripheral IV    DVT prophylaxis:    DVT prophylaxis Med- No  DVT prophylaxis SCD or LAMONT- Refused     Wounds: (If Applicable)    Wounds- No      Patient Safety:    Falls Score Total Score: 1  Safety Level_______  Bed Alarm On? No  Sitter?  No    Plan for upcoming shift: possible transfer to [facility] but will more than likely not happen today.They will call when they have bed        Discharge Plan: Probable transfer to [facility] when bed available    Active Consults:  IP CONSULT TO GASTROENTEROLOGY  IP CONSULT TO INFECTIOUS DISEASES

## 2017-07-12 NOTE — ROUTINE PROCESS
* No surgery found *  Bedside shift change report given to [address] [place] [address] (oncoming nurse) by Cheri Alberto (offgoing nurse). Report included the following information [facility]. [facility] Phone:   [phone]      Significant changes during shift:  Started on acetylcysteine , continuous drip in progress        Patient Information    Greg Clark  45 y.o.  7/10/2017 11:57 AM by Kate Baird MD. Greg Clark was admitted from Home    Problem List    Patient Active Problem List    Diagnosis Date Noted    Jaundice 07/10/2017    Coagulopathy ([address] 07/10/2017    Acute hepatitis 06/29/2017    Depression 01/27/2017    Tobacco abuse 08/25/2016    Perianal lesion 07/05/2016    Encounter for screening colonoscopy 05/18/2016    Anal condyloma 08/06/2013    HIV (human immunodeficiency virus infection)1991 07/15/2011    Liver diseasesecondary to HIV 07/15/2011    Viral hepatitis B chronic ([address] 07/15/2011     Past Medical History:   Diagnosis Date    Cirrhosis ([address]     Hepatitis B     treated and as of 6/29/16 pt states tests are negative:  Dr Paula Arroyo    [facility] ([facility] infection) [facility]     HIV infection ([address] Dx: 1991    Dr Myra Sibley Liver failure ([address] approx 2008    as of 6/29/16 pt denies any problems with liver         Core Measures:    CVA: No No  CHF:No No  PNA:No No      Activity Status:    OOB to Chair No  Ambulated this shift Yes   Bed Rest No    Supplemental O2: (If Applicable)    NC No  NRB No  Venti-mask No      LINES AND DRAINS:    No lines or drains except peripheral IV    DVT prophylaxis:    DVT prophylaxis Med- No  DVT prophylaxis SCD or LAMONT- Refused     Wounds: (If Applicable)    Wounds- No      Patient Safety:    Falls Score Total Score: 1  Safety Level_______  Bed Alarm On? No  Sitter?  No    Plan for upcoming shift: PT/OT        Discharge Plan: Yes when stable    Active Consults:  IP CONSULT TO GASTROENTEROLOGY  IP CONSULT TO INFECTIOUS DISEASES

## 2017-07-12 NOTE — PROGRESS NOTES
DR Melisa Gilliam notified that inderal was held this am as patients systolic BP in 11'V. She stated that is fine.

## 2017-07-12 NOTE — PROGRESS NOTES
GI PROGRESS NOTE    NAME:             Silva Robbins   :              1965   MRN:              [id number]   Admit Date:     7/10/2017  Todays Date:  2017      Subjective:           Feels about the same. Having upper abdominal pain again. No mental confusion. Medications-reviewed     Current Facility-Administered Medications   Medication Dose Route Frequency    phytonadione (vitamin K1) (AQUA-MEPHYTON) 10 mg in 0.9% sodium chloride 50 mL IVPB  10 mg IntraVENous DAILY    acetylcysteine (ACETADOTE) 6,300 mg in dextrose 5% 1,000 mL infusion  100 mg/kg IntraVENous CONTINUOUS    therapeutic multivitamin (THERAGRAN) tablet 1 Tab  1 Tab Oral DAILY    ondansetron (ZOFRAN) injection 4 mg  4 mg IntraVENous Q6H PRN    propranolol (INDERAL) tablet 10 mg  10 mg Oral BID    oxyCODONE IR (ROXICODONE) tablet 5 mg  5 mg Oral Q4H PRN    emtricitabine-tenofovir (TDF) (TRUVADA) 200-300 mg per tablet 1 Tab  1 Tab Oral DAILY    raltegravir (ISENTRESS) tablet 400 mg  400 mg Oral BID        Objective:     Patient Vitals for the past 8 hrs:   BP Temp Pulse Resp SpO2   17 0724 99/68 97.7 °F (36.5 °C) (!) 56 16 94 %        07/10 1901 -  0700  In: 143.8 [I.V.:143.8]  Out: -     EXAM:  Jaundiced   NEURO-a&o.  No asterixis   HEENT-sclera icteric   LUNGS-clear   COR-regular rate and rhythym   ABD-soft , no tenderness, no rebound, good bs          LABS:  Recent Labs      07/11/17   0134  07/10/17   1230   WBC  6.9  9.6   HGB  13.1  15.0   HCT  38.3  42.1   PLT  141*  173     Recent Labs      17   0407  07/11/17   0134  07/10/17   1401   NA  133*  138  137   K  3.5  4.2  5.0   CL  99  103  101   CO2  30  31  33*   BUN  10  13  16   CREA  0.65*  0.69*  0.91   GLU  105*  99  101*   CA  7.8*  8.3*  8.8   MG   --   2.0   --      Recent Labs      17   04017  07/10/17   1720  07/10/17   1401   SGOT  421*  439*   --   560*   AP  111  114   --   133*   TBILI  16.5*  15.3*   --   17.9*   TP  6.1* 6.1*   --   7.1   ALB  1.7*  2.0*   --   2.3*   GLOB  4.4*  4.1*   --   4.8*   LPSE   --    --   157   --    ALT  531*  543*   --   680*     Recent Labs      07/12/17   0407  07/11/17   0134  07/10/17   1230   INR  3.3*  2.7*  2.4*   PTP  34.1*  27.7*  25.3*   APTT   --    --   49.7*                           Assessment:   Acute flare of hepatitis B with hepatic decompensation. Worrisome that the INR continues to rise.   Small amount ascites  Upper abdominal pain  Chronic HBV----off meds until last week  HIV---off meds until last week         Active Problems:    HIV (human immunodeficiency virus infection)1991 (7/15/2011)      Viral hepatitis B chronic ([facility] (7/15/2011)      Jaundice (7/10/2017)      Coagulopathy ([facility] (7/10/2017)        Plan:   I will speak to Dr, Mickeal Aase again today about need to transfer patient to a liver transplant center in case he continues to deteriorate  Vitamin K  Watch for hepatic encephalopathy---not present now  Continue Acetadote as a continuous infusion  Continue Odefsey

## 2017-07-12 NOTE — PROGRESS NOTES
PSR received a phone call from John Calzada w/ [phone], who follows up with claims for [facility]. John Calzada requested for a backdated insurance referral for pts date of service on 7/5/2016 with Dr. Gilmar Juárez. John Calzada states the following is pts Ref acct [de-identified]. Basil's contact number is [phone]. PSR called [employer] to inquire about a backdated referral for 7/5/16 date of service for pt. PSR spoke w/ Higinio Maravilla. Higinio Deter [employer] can only backdate referrals as far back as a year. Anything further than a year, the referred-to provider (Dr. Anibal Lora) has to fax over clinical documentation to [facility] and Appeals Department (fax [phone], ph [phone] option [phone]) for an exception for backdated referral to be authorized. The reference number for this [facility] call w/ Higinio Maravilla is Ref# [id number]. PSR relayed above information to John Calzada. John Calzada verbalized understanding.

## 2017-07-13 NOTE — PROGRESS NOTES
ID Progress Note    Subjective:   Daily Progress Note: 2017 2:28 PM    DX:    1. Relapsing HBV infection secondary to going off meds. Back on TDF for one week, but readmitted with worsening liver failure. 2. HIV infection.       3. Anorectal Carcinoma .           Review of Systems: Afebrile. Still weak, anorexic, jaundiced, and nauseated. No AMS. +abdominal discomfort. No joint inflammation or swelling. No headache or AMS. ROS otherwise negative. Objective:     Visit Vitals    /64 (BP 1 Location: Right arm, BP Patient Position: At rest)    Pulse 60    Temp 97.6 °F (36.4 °C)    Resp 16    Ht 6' (1.829 m)    Wt 136 lb 0.4 oz (61.7 kg)    SpO2 93%    BMI 18.45 kg/m2      O2 Device: Room air    Temp (24hrs), Av.7 °F (36.5 °C), Min:97.5 °F (36.4 °C), Max:97.9 °F (36.6 °C)  General: Afebrile and not toxic. No exanthem or enanthem. No peripheral adenopathy. Alert. All IV sites are clean and dry. Jaundiced. HEENT: EOMI. +icteric sclerae. Oral mucosa normal. Conjunctivae normal. Neck supple. Chest: Lungs clear to A&P. Regular rhythm without murmurs  Abdomen: Soft without organomegaly, +tenderness. Mildly distended. ? ascites. Musculoskeletal: No joint inflammation or effusions. No edema. Neurologic: Nonfocal exam. No liver flap. Data Review:  Ammonia <10. Bili = 17.9. ALT/AST= 500/378. AP = 120. Creat = 0.71. INR = 2.9.         Recent Results (from the past 24 hour(s))   METABOLIC PANEL, COMPREHENSIVE    Collection Time: 17  3:25 AM   Result Value Ref Range    Sodium 134 (L) 136 - 145 mmol/L    Potassium 3.6 3.5 - 5.1 mmol/L    Chloride 98 97 - 108 mmol/L    CO2 30 21 - 32 mmol/L    Anion gap 6 5 - 15 mmol/L    Glucose 93 65 - 100 mg/dL    BUN 9 6 - 20 MG/DL    Creatinine 0.71 0.70 - 1.30 MG/DL    BUN/Creatinine ratio 13 12 - 20      GFR est AA >60 >60 ml/min/1.73m2    GFR est non-AA >60 >60 ml/min/1.73m2    Calcium 8.1 (L) 8.5 - 10.1 MG/DL    Bilirubin, total 17.9 (H) 0.2 - 1.0 MG/DL    ALT (SGPT) 500 (H) 12 - 78 U/L    AST (SGOT) 378 (H) 15 - 37 U/L    Alk. phosphatase 120 (H) 45 - 117 U/L    Protein, total 6.4 6.4 - 8.2 g/dL    Albumin 1.7 (L) 3.5 - 5.0 g/dL    Globulin 4.7 (H) 2.0 - 4.0 g/dL    A-G Ratio 0.4 (L) 1.1 - 2.2     PROTHROMBIN TIME + INR    Collection Time: 07/13/17  3:25 AM   Result Value Ref Range    INR 2.9 (H) 0.9 - 1.1      Prothrombin time 29.8 (H) 9.0 - 11.1 sec         Assessment/Plan:    Liver failure due to relapse of chronic HBV infection following self-discontinuation of meds. Has been back on meds for only a week, and may be too soon for effect, if that happens at all. Continue HAART with TDF in regimen as the active HBV drug. INR and transaminases slightly lower, hopefully a trend. HIV genotype with no new resistance.

## 2017-07-13 NOTE — ROUTINE PROCESS
* No surgery found *  Bedside shift change report given to Oli Salguero (oncoming nurse) by Jory Rich (offgoing nurse). Report included the following information [facility]. [facility] Phone:   [phone]      Significant changes during shift:  Med x 1 for nausea with relief        Patient Information    Todd Mcguire  45 y.o.  7/10/2017 11:57 AM by Isidro Lowe MD. Todd Mcguire was admitted from Home    Problem List    Patient Active Problem List    Diagnosis Date Noted    Jaundice 07/10/2017    Coagulopathy ([address] 07/10/2017    Acute hepatitis 06/29/2017    Depression 01/27/2017    Tobacco abuse 08/25/2016    Perianal lesion 07/05/2016    Encounter for screening colonoscopy 05/18/2016    Anal condyloma 08/06/2013    HIV (human immunodeficiency virus infection)1991 07/15/2011    Liver diseasesecondary to HIV 07/15/2011    Viral hepatitis B chronic ([address] 07/15/2011     Past Medical History:   Diagnosis Date    [facility]     Hepatitis B     treated and as of 6/29/16 pt states tests are negative:  Dr Slick Espinoza    HIV (human immunodeficiency virus infection) ([facility]     HIV infection ([facility] Dx: 1991    Dr Sheela Mendes Liver failure ([facility] approx 2008    as of 6/29/16 pt denies any problems with liver         Core Measures:    CVA: No No  CHF:No No  PNA:No No      Activity Status:    OOB to Chair No  Ambulated this shift Yes   Bed Rest No    Supplemental O2: (If Applicable)    NC No  NRB No  Venti-mask No      LINES AND DRAINS:    No lines or drains except peripheral IV    DVT prophylaxis:    DVT prophylaxis Med- No  DVT prophylaxis SCD or LAMONT- Refused     Wounds: (If Applicable)    Wounds- No      Patient Safety:    Falls Score Total Score: 1  Safety Level_______  Bed Alarm On? No  Sitter?  No    Plan for upcoming shift:  Continue to monitor        Discharge Plan: Home when stable    Active Consults:  IP CONSULT TO GASTROENTEROLOGY  IP CONSULT TO INFECTIOUS DISEASES

## 2017-07-13 NOTE — ROUTINE PROCESS
* No surgery found *  Bedside shift change report given to [address] (oncoming nurse) by Miguel Montes RN (offgoing nurse). Report included the following information [facility]. [facility] Phone:   [phone]      Significant changes during shift:  Dr Sharona Azar and DR Sheree Treadwell spoke with [facility] regarding transferring patient to transplant center. Waiting to hear from them when they have bed and have accepted patient        Patient Information    David Hartman  45 y.o.  7/10/2017 11:57 AM by Nate Romero MD. 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    HIV (human immunodeficiency virus infection)1991 07/15/2011    Liver diseasesecondary to HIV 07/15/2011    Viral hepatitis B chronic ([address] 07/15/2011     Past Medical History:   Diagnosis Date    Cirrhosis ([address]     Hepatitis B     treated and as of 6/29/16 pt states tests are negative:  Dr Keerthi Gonsalves HIV (human immunodeficiency virus infection) ([facility]     HIV infection ([address] Dx: 1991    Dr Keerthi Gonsalves Liver failure ([address] approx 2008    as of 6/29/16 pt denies any problems with liver         Core Measures:    CVA: No No  CHF:No No  PNA:No No      Activity Status:    OOB to Chair No  Ambulated this shift Yes   Bed Rest No    Supplemental O2: (If Applicable)    NC No  NRB No  Venti-mask No      LINES AND DRAINS:    No lines or drains except peripheral IV    DVT prophylaxis:    DVT prophylaxis Med- No  DVT prophylaxis SCD or LAMONT- Refused     Wounds: (If Applicable)    Wounds- No      Patient Safety:    Falls Score Total Score: 1  Safety Level_______  Bed Alarm On? No  Sitter?  No    Plan for upcoming shift: IV medication drip, pain medication        Discharge Plan: Probable transfer to [facility] when bed available    Active Consults:  IP CONSULT TO GASTROENTEROLOGY  IP CONSULT TO INFECTIOUS DISEASES

## 2017-07-13 NOTE — PROGRESS NOTES
GI PROGRESS NOTE    NAME:             Siomara Pastrana   :              1965   MRN:              [id number]   Admit Date:     7/10/2017  Todays Date:  2017      Subjective:         Nauseated with some dry heaves. Not confused. Medications-reviewed     Current Facility-Administered Medications   Medication Dose Route Frequency    phytonadione (vitamin K1) (AQUA-MEPHYTON) 10 mg in 0.9% sodium chloride 50 mL IVPB  10 mg IntraVENous DAILY    acetylcysteine (ACETADOTE) 6,300 mg in dextrose 5% 1,000 mL infusion  100 mg/kg IntraVENous CONTINUOUS    therapeutic multivitamin (THERAGRAN) tablet 1 Tab  1 Tab Oral DAILY    ondansetron (ZOFRAN) injection 4 mg  4 mg IntraVENous Q6H PRN    oxyCODONE IR (ROXICODONE) tablet 5 mg  5 mg Oral Q4H PRN    emtricitabine-tenofovir (TDF) (TRUVADA) 200-300 mg per tablet 1 Tab  1 Tab Oral DAILY    raltegravir (ISENTRESS) tablet 400 mg  400 mg Oral BID        Objective:     Patient Vitals for the past 8 hrs:   BP Temp Pulse Resp SpO2   17 0322 114/77 97.8 °F (36.6 °C) 60 16 94 %         1901 -  0700  In: 1016 [P.O.:500; I.V.:516]  Out: -     EXAM:  Jaundiced   NEURO-a&o.  No asterixis   HEENT-sclera icteric   LUNGS-clear   COR-regular rate and rhythym   ABD-soft , no tenderness, no rebound, good bs          LABS:  Recent Labs      17   0134  07/10/17   1230   WBC  6.9  9.6   HGB  13.1  15.0   HCT  38.3  42.1   PLT  141*  173     Recent Labs      17   0325  17   0407  17   0134   NA  134*  133*  138   K  3.6  3.5  4.2   CL  98  99  103   CO2  30  30  31   BUN  9  10  13   CREA  0.71  0.65*  0.69*   GLU  93  105*  99   CA  8.1*  7.8*  8.3*   MG   --    --   2.0     Recent Labs      17   0325  17   0407  17   0134  07/10/17   1720   SGOT  378*  421*  439*   --    AP  120*  111  114   --    TBILI  17.9*  16.5*  15.3*   --    TP  6.4  6.1*  6.1*   --    ALB  1.7*  1.7*  2.0*   --    GLOB  4.7*  4.4*  4.1*   --    LPSE   -- --    --   157   ALT  500*  531*  543*   --      Recent Labs      07/13/17   0325  07/12/17   0407  07/11/17   0134  07/10/17   1230   INR  2.9*  3.3*  2.7*  2.4*   PTP  29.8*  34.1*  27.7*  25.3*   APTT   --    --    --   49.7*                           Assessment:   Acute flare of hepatitis B with hepatic decompensation.   Encourage that the INR has improved today  Small amount ascites  Upper abdominal pain  Chronic HBV----off meds until last week  HIV---off meds until last week         Active Problems:    HIV (human immunodeficiency virus infection)1991 (7/15/2011)      Viral hepatitis B chronic ([facility] [address] [facility] (7/15/2011)      Jaundice (7/10/2017)      Coagulopathy ([facility] [address] (7/10/2017)        Plan:   Spoke Manolo Isidro again today about need to transfer patient to a liver transplant center and we feel he does not need transfer at this time  Vitamin K 10 mg IV for 2 more doses  Watch for hepatic encephalopathy---not present now  Continue Acetadote as a continuous infusion  Continue Odefsey

## 2017-07-13 NOTE — PROGRESS NOTES
Visited by Clive Cohen Partner on 7/13/17.     SARTHAK Skinner, [facility], [address] [facility] [address]     Paging Service  [phone])

## 2017-07-13 NOTE — ROUTINE PROCESS
Bedside shift change report given to Jonh Proctor RN (oncoming nurse) by Rachid Cox (offgoing nurse). Report included the following information [facility].     [facility] Phone:   [phone]        Significant changes during shift:  Dr Michelle Sanderson and DR Jason Cummins spoke with [facility] regarding transferring patient to transplant center. Waiting to hear from them when they have bed and have accepted patient           Patient Information     Caryn Martin  45 y.o.  7/10/2017 11:57 AM by Lurdes Cole MD. Caryn Martin was admitted from Home     Problem List          Patient Active Problem List     Diagnosis Date Noted    Jaundice 07/10/2017    Coagulopathy ([facility] 07/10/2017    Acute hepatitis 06/29/2017    Depression 01/27/2017    Tobacco abuse 08/25/2016    Perianal lesion 07/05/2016    Encounter for screening colonoscopy 05/18/2016    Anal condyloma 08/06/2013    HIV (human immunodeficiency virus infection)1991 07/15/2011    Liver diseasesecondary to HIV 07/15/2011    Viral hepatitis B chronic ([facility] 07/15/2011           Past Medical History:   Diagnosis Date    Cirrhosis ([facility]      Hepatitis B       treated and as of 6/29/16 pt states tests are negative:  Dr Yanci Walter HIV (human immunodeficiency virus infection) ([facility]      HIV infection ([facility] Dx: Fortunato Walter Liver failure ([facility] approx 2008     as of 6/29/16 pt denies any problems with liver            Core Measures:     CVA: No No  CHF:No No  PNA:No No        Activity Status:     OOB to Chair No  Ambulated this shift Yes   Bed Rest No     Supplemental O2: (If Applicable)     NC No  NRB No  Venti-mask No        LINES AND DRAINS:     No lines or drains except peripheral IV     DVT prophylaxis:     DVT prophylaxis Med- No  DVT prophylaxis SCD or LAMONT- Refused      Wounds: (If Applicable)     Wounds- No        Patient Safety:     Falls Score Total Score: 1  Safety Level_______  Bed Alarm On? No  Sitter?  No     Plan for upcoming shift:   Possible transfer               Discharge Plan: Probable transfer to [facility] when bed available     Active Consults:  IP CONSULT TO GASTROENTEROLOGY  IP CONSULT TO INFECTIOUS DISEASES

## 2017-07-14 NOTE — PROGRESS NOTES
Problem: Falls - Risk of  Goal: *Absence of falls  Outcome: Progressing Towards Goal  Pt up ad karissa. Denies dizziness, lightheadedness. Denies pain at present.

## 2017-07-14 NOTE — PROGRESS NOTES
Hospitalist Progress Note    NAME: Iraj Flores   :  1965   MRN:  [id number]       Assessment / Plan:  Relapsing HBV infection in settings of cirrhosis   + jaundice, LFTs slowly improving, INR better s/p vit K x 3  - stable   hyperbilirubinemia, coagulopathy, hypoalbuminemia, no ascites  Monitor for encephalopathy - none at present   S/p acetadote   GI help was greatly appreciated: INR is better - no need to transfer to [facility]   Started on BB this admission but BP was borderline low 90th, so was DC   Diet: per GI, on CLD   MRCP done on  shows no obstruction  US 7/10: Abnormal liver possible cirrhosis. Bone alignment splenomegaly,  distended gallbladder with sludge. Small amount of ascites. Hyponatremia  Start IVF  Monitor closely     HIV  Dr Carmine Gonzalez help appreciated   c/w anti retroviral medication    Lactic acidosis, POA, resolved   Underweight, BMI 18.81   Coagulopathy, s/p Vit K  Thrombocytopenia, stable, due to cirrhosis   GERD, c/w home H2 blocker          Code Status: Full Code   Surrogate Decision Maker: sister Kiran Molina, brother Gris Hardy [phone]  Family does not know form HIV diagnosis and he does not want that to be disclosed to the family  DVT Prophylaxis: SCD ( coagulopathy)     Baseline: weak and frail lives with brother Gris Hardy  Recommended Disposition: TBD - per GI      Subjective:     Chief Complaint / Reason for Physician Visit: following elevated LFTs / HIV/ coagulopathy   Feeling better   Nausea on and off       Discussed with RN events overnight. Review of Systems:  Symptom Y/N Comments  Symptom Y/N Comments   Fever/Chills n   Chest Pain n    Poor Appetite    Edema     Cough    Abdominal Pain n    Sputum    Joint Pain     SOB/FAIR n   Pruritis/Rash     Nausea/vomit n   Tolerating PT/OT     Diarrhea n   Tolerating Diet     Constipation n   Other       Could NOT obtain due to:      Objective:     VITALS:   Last 24hrs VS reviewed since prior progress note.  Most recent are:  Patient Vitals for the past 24 hrs:   Temp Pulse Resp BP SpO2   07/14/17 0731 97.5 °F (36.4 °C) 67 16 108/80 93 %   07/14/17 0343 97.3 °F (36.3 °C) 76 16 106/71 93 %   07/13/17 2321 97.6 °F (36.4 °C) 63 16 96/57 95 %   07/13/17 1953 97.4 °F (36.3 °C) 62 16 110/70 92 %   07/13/17 1513 97.7 °F (36.5 °C) (!) 58 16 110/67 92 %       Intake/Output Summary (Last 24 hours) at 07/14/17 1124  Last data filed at 07/13/17 2250   Gross per 24 hour   Intake          1022.33 ml   Output                0 ml   Net          1022.33 ml        PHYSICAL EXAM:  General: WD, WN. Alert, cooperative, no acute distress    EENT:  EOMI. Icteric sclerae. MMM  Resp:  CTA bilaterally, no wheezing or rales. No accessory muscle use  CV:  Regular  rhythm,  No edema  GI:  Soft, Non distended, Non tender.  +Bowel sounds  Neurologic:  Alert and oriented X 3, normal speech,   Psych:   Good insight. Not anxious nor agitated  Skin:  No rashes. + jaundice    Reviewed most current lab test results and cultures  YES  Reviewed most current radiology test results   YES  Review and summation of old records today    NO  Reviewed patient's current orders and MAR    YES  PMH/ reviewed - no change compared to H&P  ________________________________________________________________________  Care Plan discussed with:    Comments   Patient y    Family      RN y    Care Manager     Consultant                        Multidiciplinary team rounds were held today with , nursing, pharmacist and clinical coordinator. Patient's plan of care was discussed; medications were reviewed and discharge planning was addressed.      ________________________________________________________________________  Total NON critical care TIME: 25  Minutes    Total CRITICAL CARE TIME Spent:   Minutes non procedure based      Comments   >50% of visit spent in counseling and coordination of care      ________________________________________________________________________  Dawit Hernandez MD     Procedures: see electronic medical records for all procedures/Xrays and details which were not copied into this note but were reviewed prior to creation of Plan. LABS:  I reviewed today's most current labs and imaging studies.   Pertinent labs include:  Recent Labs      07/14/17 0413   WBC  9.4   HGB  15.2   HCT  43.1   PLT  148*     Recent Labs      07/14/17   0413  07/13/17   0325  07/12/17   0407   NA  132*  134*  133*   K  3.6  3.6  3.5   CL  97  98  99   CO2  29  30  30   GLU  98  93  105*   BUN  10  9  10   CREA  0.81  0.71  0.65*   CA  8.2*  8.1*  7.8*   ALB  1.6*  1.7*  1.7*   TBILI  19.4*  17.9*  16.5*   SGOT  321*  378*  421*   ALT  443*  500*  531*   INR  2.8*  2.9*  3.3*       Signed: Beltran Hurtado MD

## 2017-07-14 NOTE — PROGRESS NOTES
Paged Dr Irma Bhardwaj, informed that Dr Dalton Magana was on call for him. Waiting for a call back to [phone]. [phone]: No call back received, paged Dr Dalton Magana again  Dr Agatha Francisco called back at [phone]. Asked how long patient should be on acetadote IV continuous fluids. Dr Agatha Francisco stated he would look into it.

## 2017-07-14 NOTE — ROUTINE PROCESS
Bedside shift change report given to 611 Mahesh Ramsey (oncoming nurse) by Dianne Zamudio (offgoing nurse). Report included the following information Kardex.     [facility] Phone:   [phone]        Significant changes during shift:  None     Patient Information     Ivana Cox  45 y.o.  7/10/2017 11:57 AM by Tonya Lee MD. Ivana Cox was admitted from Home     Problem List          Patient Active Problem List     Diagnosis Date Noted    Jaundice 07/10/2017    Coagulopathy ([address] 07/10/2017    Acute hepatitis 06/29/2017    Depression 01/27/2017    Tobacco abuse 08/25/2016    Perianal lesion 07/05/2016    Encounter for screening colonoscopy 05/18/2016    Anal condyloma 08/06/2013    HIV (human immunodeficiency virus infection)1991 07/15/2011    Liver diseasesecondary to HIV 07/15/2011    Viral hepatitis B chronic ([address] 07/15/2011           Past Medical History:   Diagnosis Date    Cirrhosis ([address]      Hepatitis B       treated and as of 6/29/16 pt states tests are negative:  Dr Asim Staley HIV (human immunodeficiency virus infection) [facility]      HIV infection ([address] Dx: Stacy Harada Dr Marcellina Rome Liver failure ([address] approx 2008     as of 6/29/16 pt denies any problems with liver            Core Measures:     CVA: No No  CHF:No No  PNA:No No        Activity Status:     OOB to Chair No  Ambulated this shift Yes   Bed Rest No     Supplemental O2: (If Applicable)     NC No  NRB No  Venti-mask No        LINES AND DRAINS:     No lines or drains except peripheral IV     DVT prophylaxis:     DVT prophylaxis Med- No  DVT prophylaxis SCD or LAMONT- Refused      Wounds: (If Applicable)     Wounds- No        Patient Safety:     Falls Score Total Score: 2  Safety Level_______  Bed Alarm On? No  Sitter?  No     Plan for upcoming shift:  Continue to monitor           Discharge Plan: Home when stable     Active Consults:  IP CONSULT TO GASTROENTEROLOGY  IP CONSULT TO INFECTIOUS DISEASES

## 2017-07-14 NOTE — PROGRESS NOTES
Bedside and Verbal shift change report given to Rakan Adams (oncoming nurse) by Temi Jenkins (offgoing nurse). Report included the following information SBAR, Kardex, Intake/Output, MAR and Recent Results.

## 2017-07-14 NOTE — PROGRESS NOTES
GI PROGRESS NOTE  Walter Varner for [facility])  NAME:             Todd Mcguire   :              1965   MRN:              [id number]   Admit Date:     7/10/2017  Todays Date:  2017      Subjective:         Feels well. Still some fatigue. Jaundice. Feeling fullness, he thinks from the IVs.    Medications-reviewed     Current Facility-Administered Medications   Medication Dose Route Frequency    0.9% sodium chloride infusion  75 mL/hr IntraVENous CONTINUOUS    acetylcysteine (ACETADOTE) 6,300 mg in dextrose 5% 1,000 mL infusion  100 mg/kg IntraVENous CONTINUOUS    therapeutic multivitamin (THERAGRAN) tablet 1 Tab  1 Tab Oral DAILY    ondansetron (ZOFRAN) injection 4 mg  4 mg IntraVENous Q6H PRN    oxyCODONE IR (ROXICODONE) tablet 5 mg  5 mg Oral Q4H PRN    emtricitabine-tenofovir (TDF) (TRUVADA) 200-300 mg per tablet 1 Tab  1 Tab Oral DAILY    raltegravir (ISENTRESS) tablet 400 mg  400 mg Oral BID        Objective:     Patient Vitals for the past 8 hrs:   BP Temp Pulse Resp SpO2 Weight   17 1152 102/73 97.6 °F (36.4 °C) 66 16 94 % -   17 0731 108/80 97.5 °F (36.4 °C) 67 16 93 % -   17 0717 - - - - - 59.6 kg (131 lb 6.3 oz)         190 -  0700  In:  [I.V.:]  Out: -     EXAM:  Jaundiced   NEURO-a&o.  No asterixis   HEENT-sclera icteric   LUNGS-clear   COR-regular rate and rhythym   ABD-soft , no tenderness, no rebound, good bs          LABS:  Recent Labs      17   0413   WBC  9.4   HGB  15.2   HCT  43.1   PLT  148*     Recent Labs      17   0413  17   0325  17   0407   NA  132*  134*  133*   K  3.6  3.6  3.5   CL  97  98  99   CO2  29  30  30   BUN  10  9  10   CREA  0.81  0.71  0.65*   GLU  98  93  105*   CA  8.2*  8.1*  7.8*     Recent Labs      17   0413  17   0325  17   0407   SGOT  321*  378*  421*   AP  121*  120*  111   TBILI  19.4*  17.9*  16.5*   TP  6.2*  6.4  6.1*   ALB  1.6*  1.7*  1.7*   GLOB  4.6*  4.7*  4.4*   ALT 443*  500*  531*     Recent Labs      07/14/17   0413  07/13/17   0325  07/12/17   0407   INR  2.8*  2.9*  3.3*   PTP  29.4*  29.8*  34.1*                           Assessment:   Acute flare of hepatitis B with hepatic decompensation. Encourage that the INR has improved today  Small amount ascites  Upper abdominal pain  Chronic HBV----off meds until last week  HIV---off meds until last week         Active Problems:    HIV (human immunodeficiency virus infection)1991 (7/15/2011)      Viral hepatitis B chronic ([facility] (7/15/2011)      Jaundice (7/10/2017)      Coagulopathy ([facility] (7/10/2017)        Plan:   Continue NAC, through the weekend, can re-assess next week if should discontinue based on labs; if fluid becomes an issue, we can convert to PO dosage, about 32 mL only oral, but it's disgusting.      Watch for hepatic encephalopathy---not present now  Anti-viral acting medications per ID

## 2017-07-14 NOTE — PROGRESS NOTES
ID Progress Note    Subjective:   Daily Progress Note: 2017 2:28 PM    DX:    1. Relapsing HBV infection secondary to going off meds. Back on TDF for one week, but readmitted with worsening liver failure. 2. HIV infection.       3. Anorectal Carcinoma .           Review of Systems: Afebrile. Still weak, anorexic, jaundiced, and nauseated. No AMS. +abdominal discomfort. No joint inflammation or swelling. No headache or AMS. ROS otherwise negative. Objective:     Visit Vitals    /80 (BP 1 Location: Right arm, BP Patient Position: At rest)    Pulse 67    Temp 97.5 °F (36.4 °C)    Resp 16    Ht 6' (1.829 m)    Wt 131 lb 6.3 oz (59.6 kg)    SpO2 93%    BMI 17.82 kg/m2      O2 Device: Room air    Temp (24hrs), Av.5 °F (36.4 °C), Min:97.3 °F (36.3 °C), Max:97.7 °F (36.5 °C)  General: Afebrile and not toxic. No exanthem or enanthem. No peripheral adenopathy. Alert. All IV sites are clean and dry. Jaundiced. HEENT: EOMI. +icteric sclerae. Oral mucosa normal. Conjunctivae normal. Neck supple. Chest: Lungs clear to A&P. Regular rhythm without murmurs  Abdomen: Soft without organomegaly, +tenderness. Mildly distended. ? ascites. Musculoskeletal: No joint inflammation or effusions. No edema. Neurologic: Nonfocal exam. No liver flap. Data Review:  . Bili = 19.4. ALT/AST= 443/321. AP = 121. Creat = 0.81. INR = 2.8. HBV is genotype A and is sensitive to all agents tested including TDF and TAF.         Recent Results (from the past 24 hour(s))   PROTHROMBIN TIME + INR    Collection Time: 17  4:13 AM   Result Value Ref Range    INR 2.8 (H) 0.9 - 1.1      Prothrombin time 29.4 (H) 9.0 - 87.5 sec   METABOLIC PANEL, COMPREHENSIVE    Collection Time: 17  4:13 AM   Result Value Ref Range    Sodium 132 (L) 136 - 145 mmol/L    Potassium 3.6 3.5 - 5.1 mmol/L    Chloride 97 97 - 108 mmol/L    CO2 29 21 - 32 mmol/L    Anion gap 6 5 - 15 mmol/L    Glucose 98 65 - 100 mg/dL    BUN 10 6 - 20 MG/DL    Creatinine 0.81 0.70 - 1.30 MG/DL    BUN/Creatinine ratio 12 12 - 20      GFR est AA >60 >60 ml/min/1.73m2    GFR est non-AA >60 >60 ml/min/1.73m2    Calcium 8.2 (L) 8.5 - 10.1 MG/DL    Bilirubin, total 19.4 (H) 0.2 - 1.0 MG/DL    ALT (SGPT) 443 (H) 12 - 78 U/L    AST (SGOT) 321 (H) 15 - 37 U/L    Alk. phosphatase 121 (H) 45 - 117 U/L    Protein, total 6.2 (L) 6.4 - 8.2 g/dL    Albumin 1.6 (L) 3.5 - 5.0 g/dL    Globulin 4.6 (H) 2.0 - 4.0 g/dL    A-G Ratio 0.3 (L) 1.1 - 2.2     CBC WITH AUTOMATED DIFF    Collection Time: 07/14/17  4:13 AM   Result Value Ref Range    WBC 9.4 4.1 - 11.1 K/uL    RBC 5.16 4.10 - 5.70 M/uL    HGB 15.2 12.1 - 17.0 g/dL    HCT 43.1 36.6 - 50.3 %    MCV 83.5 80.0 - 99.0 FL    MCH 29.5 26.0 - 34.0 PG    MCHC 35.3 30.0 - 36.5 g/dL    RDW 17.2 (H) 11.5 - 14.5 %    PLATELET 360 (L) 668 - 400 K/uL    NEUTROPHILS 42 32 - 75 %    LYMPHOCYTES 40 12 - 49 %    MONOCYTES 14 (H) 5 - 13 %    EOSINOPHILS 4 0 - 7 %    BASOPHILS 0 0 - 1 %    ABS. NEUTROPHILS 3.9 1.8 - 8.0 K/UL    ABS. LYMPHOCYTES 3.8 (H) 0.8 - 3.5 K/UL    ABS. MONOCYTES 1.4 (H) 0.0 - 1.0 K/UL    ABS. EOSINOPHILS 0.3 0.0 - 0.4 K/UL    ABS. BASOPHILS 0.0 0.0 - 0.1 K/UL         Assessment/Plan:    Liver failure due to relapse of chronic HBV infection following self-discontinuation of meds. Has been back on meds for only a week, and may be too soon for effect, if that happens at all. Continue HAART with TDF in regimen as the active HBV drug. His HBV is sensitive to TDF and TAF, so resistance is not an issue. INR and transaminases slightly lower, hopefully a trend. Bili still rising. INR stable. Will check back first of the week. Call over the weekend at [phone] if needed.

## 2017-07-14 NOTE — ROUTINE PROCESS
* No surgery found *  Bedside shift change report given to 4400 Sushil Sierra (oncoming nurse) by Stephany Reardon RN (offgoing nurse). Report included the following information Kardex. [facility] Phone:   [phone]      Significant changes during shift:  None    Patient Information    Mulu Kunz  45 y.o.  7/10/2017 11:57 AM by David Gardner MD. 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    HIV (human immunodeficiency virus infection)1991 07/15/2011    Liver diseasesecondary to HIV 07/15/2011    Viral hepatitis B chronic ([address] 07/15/2011     Past Medical History:   Diagnosis Date    Cirrhosis ([address]     Hepatitis B     treated and as of 6/29/16 pt states tests are negative:  Dr Adela Cruz    HIV (human immunodeficiency virus infection) ([facility]     HIV infection ([address] Dx: 1991    Dr Belinda Paredes Liver failure ([address] approx 2008    as of 6/29/16 pt denies any problems with liver         Core Measures:    CVA: No No  CHF:No No  PNA:No No      Activity Status:    OOB to Chair No  Ambulated this shift Yes   Bed Rest No    Supplemental O2: (If Applicable)    NC No  NRB No  Venti-mask No      LINES AND DRAINS:    No lines or drains except peripheral IV    DVT prophylaxis:    DVT prophylaxis Med- No  DVT prophylaxis SCD or LAMONT- Refused     Wounds: (If Applicable)    Wounds- No      Patient Safety:    Falls Score Total Score: 2  Safety Level_______  Bed Alarm On? No  Sitter?  No    Plan for upcoming shift:  Continue to monitor        Discharge Plan: Home when stable    Active Consults:  IP CONSULT TO GASTROENTEROLOGY  IP CONSULT TO INFECTIOUS DISEASES

## 2017-07-14 NOTE — PROGRESS NOTES
Nutrition Services      Nutrition Screen  Wt Readings from Last 20 Encounters:   07/14/17 59.6 kg (131 lb 6.3 oz)   07/10/17 62.9 kg (138 lb 9.6 oz)   06/29/17 62.7 kg (138 lb 3.7 oz)   01/27/17 66.7 kg (147 lb)   01/23/17 66.4 kg (146 lb 6.4 oz)   09/20/16 61.3 kg (135 lb 3.2 oz)   08/25/16 62.3 kg (137 lb 6.4 oz)   07/05/16 63.8 kg (140 lb 9.6 oz)   05/18/16 67.5 kg (148 lb 12.8 oz)   04/04/16 67.1 kg (148 lb)   12/07/15 64.9 kg (143 lb)   03/26/15 61.2 kg (135 lb)   11/24/14 65.3 kg (144 lb)   05/12/14 67.1 kg (148 lb)   08/08/13 66 kg (145 lb 9.6 oz)   08/06/13 65.3 kg (144 lb)   06/28/12 65.7 kg (144 lb 12.8 oz)   06/27/12 66.7 kg (147 lb)   12/08/11 67.8 kg (149 lb 6.4 oz)   08/08/11 68 kg (150 lb)     Body mass index is 17.82 kg/(m^2). Supplements:                        _____ ordered ______  declined. __ __  Pt is nutritionally stable at this time, will rescreen in 7 days. _x __    Pt is at nutritional risk and will be rescreened in 1-4 days. __ __  Pt is at moderate or high nutritional risk, will refer to RD for assessment.        Stan Dang  Dietetic Technician, Registered

## 2017-07-15 NOTE — PROGRESS NOTES
GI PROGRESS NOTE  Scooter Kaur for Jeanetteistvere)  NAME:             Ovi Pi   :              1965   MRN:              [id number]   Admit Date:     7/10/2017  Todays Date:  7/15/2017      Subjective:         Dr. Blanca Reed input noted. Appreciate the help. Today, he feels okay, but not great. Threw up breakfast this AM. We discussed oral NAC vs IV (he feels IV maybe is making his bloating sensation worse), but he also thinks he'd vomit up the oral NAC, as do I. Mental status appropriate. Medications-reviewed     Current Facility-Administered Medications   Medication Dose Route Frequency    0.9% sodium chloride infusion  75 mL/hr IntraVENous CONTINUOUS    acetylcysteine (ACETADOTE) 6,300 mg in dextrose 5% 1,000 mL infusion  100 mg/kg IntraVENous CONTINUOUS    therapeutic multivitamin (THERAGRAN) tablet 1 Tab  1 Tab Oral DAILY    ondansetron (ZOFRAN) injection 4 mg  4 mg IntraVENous Q6H PRN    oxyCODONE IR (ROXICODONE) tablet 5 mg  5 mg Oral Q4H PRN    emtricitabine-tenofovir (TDF) (TRUVADA) 200-300 mg per tablet 1 Tab  1 Tab Oral DAILY    raltegravir (ISENTRESS) tablet 400 mg  400 mg Oral BID        Objective:     Patient Vitals for the past 8 hrs:   BP Temp Pulse Resp SpO2   07/15/17 0754 114/71 97.6 °F (36.4 °C) 91 16 97 %   07/15/17 0408 96/72 97.7 °F (36.5 °C) 74 16 95 %         1901 - 07/15 0700  In: 511.7 [I.V.:511.7]  Out: -     EXAM:  Jaundiced   NEURO-a&o.  No asterixis   HEENT-sclera icteric   LUNGS-clear   COR-regular rate and rhythym   ABD-soft , no tenderness, no rebound, good bs     LABS:  Recent Labs      17   0413   WBC  9.4   HGB  15.2   HCT  43.1   PLT  148*     Recent Labs      07/15/17   0419  17   0413  17   0325   NA  132*  132*  134*   K  3.8  3.6  3.6   CL  96*  97  98   CO2  32  29  30   BUN  9  10  9   CREA  0.94  0.81  0.71   GLU  67  98  93   CA  8.6  8.2*  8.1*     Recent Labs      07/15/17   0419  17   0413  17   0325   SGOT  342*  321* 378*   AP  136*  121*  120*   TBILI  21.3*  19.4*  17.9*   TP  6.9  6.2*  6.4   ALB  1.9*  1.6*  1.7*   GLOB  5.0*  4.6*  4.7*   ALT  452*  443*  500*     Recent Labs      07/15/17   0419  07/14/17   0413  07/13/17   0325   INR  2.7*  2.8*  2.9*   PTP  28.1*  29.4*  29.8*                           Assessment:   Acute flare of hepatitis B with hepatic decompensation. Encourage that the INR has improved today  Small amount ascites  Upper abdominal pain  Chronic HBV----off meds until last week  HIV---off meds until last week         Active Problems:    HIV (human immunodeficiency virus infection)1991 (7/15/2011)      Viral hepatitis B chronic ([facility] (7/15/2011)      Jaundice (7/10/2017)      Coagulopathy ([facility] (7/10/2017)        Plan:   Continue NAC, through the weekend, can re-assess next week if should discontinue based on labs; if fluid becomes an issue, we can convert to PO dosage, about 32 mL only oral, but it's disgusting and he'll probably throw it up. Watch for hepatic encephalopathy---not present now  Anti-viral acting medications per ID   Add nutritional supps to diet.

## 2017-07-15 NOTE — PROGRESS NOTES
Bedside shift change report given to Lisa LAW (oncoming nurse) by Jennifer Hobbs nurseMike. Report included the following information Kardex.      [facility] Phone:   [phone]          Significant changes during shift:  None      Patient Information  Teja Zapata y.o.  7/10/2017 11:57 AM by David Gardner MD. 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/06/2013    HIV (human immunodeficiency virus infection)1991 07/15/2011    Liver diseasesecondary to HIV 07/15/2011    Viral hepatitis B chronic ([facility] 07/15/2011               Past Medical History:   Diagnosis Date    Cirrhosis ([facility]       Hepatitis B         treated and as of 6/29/16 pt states tests are negative:  Dr Adela Cruz    HIV (human immunodeficiency virus infection) ([facility]       HIV infection ([facility] Dx: Samuel Paredes Liver failure ([facility] approx 2008      as of 6/29/16 pt denies any problems with liver               Core Measures:      CVA: No No  CHF:No No  PNA:No No          Activity Status:      OOB to Chair No  Ambulated this shift Yes   Bed Rest No      Supplemental P2: (TN Applicable)      NC No  NRB No  Venti-mask No          LINES AND DRAINS:      No lines or drains except peripheral IV      DVT prophylaxis:      DVT prophylaxis Med- No  DVT prophylaxis SCD or LAMONT- Refused       Wounds: (If Applicable)      Wounds- No          Patient Safety:      Falls Score Total Score: 2  Safety Level_______  Bed Alarm On? No  Sitter?  No      Plan for upcoming shift:  Continue to monitor              Discharge Plan: Home when stable      Active Consults:  IP CONSULT TO GASTROENTEROLOGY  IP CONSULT TO INFECTIOUS DISEASES

## 2017-07-15 NOTE — PROGRESS NOTES
Nutrition Assessment:    RECOMMENDATIONS:   Advance diet as medically able per GI  Agree with Ensure TID (will make chocolate per pt's preference)     DIETITIANS INTERVENTIONS/PLAN:   Continue diet, advance as tolerated  Agree with ensure TID  Monitor appetite/PO intake    ASSESSMENT:   Pt admitted with jaundice and hepatitis B.  PMH: hepatitis B, HIV, ETOH abuse, cirrhosis, GERD, recently discharged. Chart reviewed. GI following and just upgraded diet today and added PO supplements. Pt will only drink chocolate ensure, will make sure we only send this. He has had significant wt loss of 10.6% over the past 7 months. N/v this morning, per GI may be related to one of the meds he is on (NAC). Pt does c/o fullness. Will monitor his appetite and need for a stronger antiemetic. SUBJECTIVE/OBJECTIVE:     Diet Order: Other (comment) (GI Lite + Ensure TID )  % Eaten:  No data found. Pertinent Medications:MVI; [email or web address]); PRN Meds: zofran. Chemistries:  Lab Results   Component Value Date/Time    Sodium 132 07/15/2017 04:19 AM    Potassium 3.8 07/15/2017 04:19 AM    Chloride 96 07/15/2017 04:19 AM    CO2 32 07/15/2017 04:19 AM    Anion gap 4 07/15/2017 04:19 AM    Glucose 67 07/15/2017 04:19 AM    BUN 9 07/15/2017 04:19 AM    Creatinine 0.94 07/15/2017 04:19 AM    BUN/Creatinine ratio 10 07/15/2017 04:19 AM    GFR est AA >60 07/15/2017 04:19 AM    GFR est non-AA >60 07/15/2017 04:19 AM    Calcium 8.6 07/15/2017 04:19 AM    AST (SGOT) 342 07/15/2017 04:19 AM    Alk. phosphatase 136 07/15/2017 04:19 AM    Protein, total 6.9 07/15/2017 04:19 AM    Albumin 1.9 07/15/2017 04:19 AM    Globulin 5.0 07/15/2017 04:19 AM    A-G Ratio 0.4 07/15/2017 04:19 AM    ALT (SGPT) 452 07/15/2017 04:19 AM      Anthropometrics: Height: 6' (182.9 cm) Weight: 59.6 kg (131 lb 6.3 oz)    IBW (%IBW):   ( ) UBW (%UBW): 66.7 kg (147 lb 0.8 oz) (January 2017) (89.36 %)    BMI: Body mass index is 17.82 kg/(m^2).     This BMI is indicative of:  [x]Underweight   []Normal   []Overweight   [] Obesity   [] Extreme Obesity (BMI>40)  Estimated Nutrition Needs (Based on): 1916 Kcals/day (MSJ 1474 x 1.3) , 72 g (1.2gPro/kg) Protein  Carbohydrate: At Least 130 g/day  Fluids: 2000 mL/day    Last BM: 7/10   []Active     []Hyperactive  [x]Hypoactive       [] Absent   BS  Skin:    [x] Intact   [] Incision  [] Breakdown   [] DTI   [] Tears/Excoriation/Abrasion  []Edema [] Other: Wt Readings from Last 30 Encounters:   07/15/17 59.6 kg (131 lb 6.3 oz)   07/10/17 62.9 kg (138 lb 9.6 oz)   06/29/17 62.7 kg (138 lb 3.7 oz)   01/27/17 66.7 kg (147 lb)   01/23/17 66.4 kg (146 lb 6.4 oz)   09/20/16 61.3 kg (135 lb 3.2 oz)   08/25/16 62.3 kg (137 lb 6.4 oz)   07/05/16 63.8 kg (140 lb 9.6 oz)   05/18/16 67.5 kg (148 lb 12.8 oz)   04/04/16 67.1 kg (148 lb)   12/07/15 64.9 kg (143 lb)   03/26/15 61.2 kg (135 lb)   11/24/14 65.3 kg (144 lb)   05/12/14 67.1 kg (148 lb)   08/08/13 66 kg (145 lb 9.6 oz)   08/06/13 65.3 kg (144 lb)   06/28/12 65.7 kg (144 lb 12.8 oz)   06/27/12 66.7 kg (147 lb)   12/08/11 67.8 kg (149 lb 6.4 oz)   08/08/11 68 kg (150 lb)   07/15/11 67.8 kg (149 lb 6.4 oz)      NUTRITION DIAGNOSES:   Problem:  Inadequate protein-energy intake      Etiology: related to HIV, hepatitis B, cirrhosis      Signs/Symptoms: as evidenced by BMI 17.8, n/v, need for ensure. NUTRITION INTERVENTIONS:  Meals/Snacks: General/healthful diet   Supplements: Commercial supplement              GOAL:   Pt will consume >50% of meals/supplements with no vomiting in 2-4 days. Cultural, Catholic, or Ethnic Dietary Needs: None     LEARNING NEEDS (Diet, Food/Nutrient-Drug Interaction):    [x] None Identified   [] Identified and Education Provided/Documented   [] Identified and Pt declined/was not appropriate      [x] Interdisciplinary Care Plan Reviewed/Documented    [x] Participated in Discharge Planning:  To be determined    [] Interdisciplinary Rounds     NUTRITION RISK:    [x] High              [] Moderate           []  Low  []  Minimal/Uncompromised    PT SEEN FOR:    []  MD Consult: []Calorie Count      []Diabetic Diet Education        []Diet Education     []Electrolyte Management     []General Nutrition Management and Supplements     []Management of Tube Feeding     []TPN Recommendations    []  RN Referral:  []MST score >=2     []Enteral/Parenteral Nutrition PTA     []Pregnant: Gestational DM or Multigestation   []  Low BMI  [x]  DTR Referral       Montserrat Guerrero, [address]   Pager [phone]  Weekend Pager [phone]

## 2017-07-15 NOTE — ROUTINE PROCESS
Bedside shift change report given to *** RN (oncoming nurse) by [place] Floor nurse). Report included the following information Kardex.      [facility] Phone:   [phone]          Significant changes during shift:  Refused IV fluids. States he is too bloated and they are making him too full feeling      Patient Information  Randi rosario  7/10/2017 11:57 AM by Rina Paige MD. 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/06/2013    HIV (human immunodeficiency virus infection)1991 07/15/2011    Liver diseasesecondary to HIV 07/15/2011    Viral hepatitis B chronic ([address] 07/15/2011               Past Medical History:   Diagnosis Date    [facility]       Hepatitis B         treated and as of 6/29/16 pt states tests are negative:  Dr Jacey Hernandez    HIV (human immunodeficiency virus infection) ([facility]       HIV infection ([facility] Dx: Francisca July Dr Kenisha Huitron Liver failure ([facility] approx 2008      as of 6/29/16 pt denies any problems with liver               Core Measures:      CVA: No No  CHF:No No  PNA:No No          Activity Status:      OOB to Chair No  Ambulated this shift Yes   Bed Rest No      Supplemental P6: (GZ Applicable)      NC No  NRB No  Venti-mask No          LINES AND DRAINS:      No lines or drains except peripheral IV      DVT prophylaxis:      DVT prophylaxis Med- No  DVT prophylaxis SCD or LAMONT- Refused       Wounds: (If Applicable)      Wounds- No          Patient Safety:      Falls Score Total Score: 2  Safety Level_______  Bed Alarm On? No  Sitter?  No      Plan for upcoming shift:  continue to monitor labs              Discharge Plan: Home when stable      Active Consults:  IP CONSULT TO GASTROENTEROLOGY  IP CONSULT TO INFECTIOUS DISEASES

## 2017-07-15 NOTE — PROGRESS NOTES
Hospitalist Progress Note    NAME: Cathy Field   :  1965   MRN:  [id number]       Assessment / Plan:  Relapsing HBV infection in settings of cirrhosis   + jaundice, LFTs slowly improving, INR better s/p vit K x 3  - stable   hyperbilirubinemia, coagulopathy, hypoalbuminemia, no ascites  Monitor for encephalopathy - none at present   On acetadote   GI help was greatly appreciated: INR is better - no need to transfer to [facility]   Started on BB this admission but BP was borderline low 90th, so was DC   Diet: per GI, on CLD   MRCP done on  shows no obstruction  US 7/10: Abnormal liver possible cirrhosis. Bone alignment splenomegaly,  distended gallbladder with sludge. Small amount of ascites. Hyponatremia  Likely due to d5w in acetode   Cont NS   Monitor closely     HIV  Dr Blas Arshad help appreciated   c/w anti retroviral medication    Lactic acidosis, POA, resolved   Underweight, protein calory malnutrition, BMI 18.81   Coagulopathy, s/p Vit K  Thrombocytopenia, stable, due to cirrhosis   GERD, c/w home H2 blocker          Code Status: Full Code   Surrogate Decision Maker: sister Princess Figueroa, brother Evelin Israel [phone]  Family does not know form HIV diagnosis and he does not want that to be disclosed to the family  DVT Prophylaxis: SCD ( coagulopathy)     Baseline: weak and frail lives with brother Evelin Israel  Recommended Disposition: TBD - per GI      Subjective:     Chief Complaint / Reason for Physician Visit: following elevated LFTs / HIV/ coagulopathy   Feeling weak  He was nausea and episode of vomiting this am but none since that time. He tolerated lunch. abd discomfort is the same since admission  Admit to  earlier today       Discussed with RN events overnight.      Review of Systems:  Symptom Y/N Comments  Symptom Y/N Comments   Fever/Chills n   Chest Pain n    Poor Appetite    Edema     Cough    Abdominal Pain n    Sputum    Joint Pain     SOB/FAIR n   Pruritis/Rash     Nausea/vomit n   Tolerating PT/OT Diarrhea n   Tolerating Diet     Constipation n   Other       Could NOT obtain due to:      Objective:     VITALS:   Last 24hrs VS reviewed since prior progress note. Most recent are:  Patient Vitals for the past 24 hrs:   Temp Pulse Resp BP SpO2   07/15/17 1137 97.5 °F (36.4 °C) 81 16 111/68 97 %   07/15/17 0754 97.6 °F (36.4 °C) 91 16 114/71 97 %   07/15/17 0408 97.7 °F (36.5 °C) 74 16 96/72 95 %   07/14/17 2324 98.3 °F (36.8 °C) 66 16 104/69 94 %   07/14/17 1918 97.3 °F (36.3 °C) 75 16 110/70 93 %     No intake or output data in the 24 hours ending 07/15/17 1525     PHYSICAL EXAM:  General: WD, WN. Alert, cooperative, no acute distress, appears very weak today    EENT:  EOMI. Icteric sclerae. MMM  Resp:  CTA bilaterally, no wheezing or rales. No accessory muscle use  CV:  Regular  rhythm,  No edema  GI:  Soft, Non distended, Non tender.  +Bowel sounds  Neurologic:  Alert and oriented X 3, normal speech,   Psych:   Good insight. Not anxious nor agitated  Skin:  No rashes. + jaundice    Reviewed most current lab test results and cultures  YES  Reviewed most current radiology test results   YES  Review and summation of old records today    NO  Reviewed patient's current orders and MAR    YES  PMH/SH reviewed - no change compared to H&P  ________________________________________________________________________  Care Plan discussed with:    Comments   Patient y    Family      RN y    Care Manager     Consultant  y GI                      Multidiciplinary team rounds were held today with , nursing, pharmacist and clinical coordinator. Patient's plan of care was discussed; medications were reviewed and discharge planning was addressed.      ________________________________________________________________________  Total NON critical care TIME: 25  Minutes    Total CRITICAL CARE TIME Spent:   Minutes non procedure based      Comments   >50% of visit spent in counseling and coordination of care ________________________________________________________________________  Nori Burgess MD     Procedures: see electronic medical records for all procedures/Xrays and details which were not copied into this note but were reviewed prior to creation of Plan. LABS:  I reviewed today's most current labs and imaging studies.   Pertinent labs include:  Recent Labs      07/14/17 0413   WBC  9.4   HGB  15.2   HCT  43.1   PLT  148*     Recent Labs      07/15/17   0419  07/14/17   0413  07/13/17   0325   NA  132*  132*  134*   K  3.8  3.6  3.6   CL  96*  97  98   CO2  32  29  30   GLU  67  98  93   BUN  9  10  9   CREA  0.94  0.81  0.71   CA  8.6  8.2*  8.1*   ALB  1.9*  1.6*  1.7*   TBILI  21.3*  19.4*  17.9*   SGOT  342*  321*  378*   ALT  452*  443*  500*   INR  2.7*  2.8*  2.9*       Signed: Nori Burgess MD

## 2017-07-15 NOTE — PROGRESS NOTES
Pt agreed to have Acetylcysteine run, but not NS for now. Will talk with MD about having so much fluid.

## 2017-07-15 NOTE — PROGRESS NOTES
[address] [facility] MD Ashkan, LAVINIA Segovia PA-C Charyl Chock, NP        at [address], [address] Teressa Brewer  22.     [phone]     FAX: [phone]    at [address], [address] [facility], [address]     [phone]     FAX: [phone]       HEPATOLOGY NOTE    I have been assisting Dr Deirdre Garrido in management of Mr Cathryn Ang. He had a flair in HBV after coming off anti-HBV therapy. The liver enzymes peaked in the 1000 range, TBILI increased to 15-20 range and he developed coagulopathy. We were concerned he was developing acute liver failure. He was placed on IV NAC and given 3 doses of IV vitamin K. He responded to vitamin K with decline in INR from 3.3 to 2.6-2.7. INR has since remained stable. TBILI has gradually drifted up to 20 but the rise and peak in TBILI is typically days behind the peak in AST/ALT and not unexpected. I do not go to [facility] and so cannot examine him. As long as he remains alert, oriented and is not developing confusion, edema or ascites he will eventually recover from this. You can call me over weekend if any questions. LABORATORY  Results for Frieda Aldana (MRN [id number]) as of 7/15/2017 08:54   Ref.  Range 7/11/2017 01:34 7/12/2017 04:07 7/13/2017 03:25 7/14/2017 04:13 7/15/2017 04:19   WBC Latest Ref Range: 4.1 - 11.1 K/uL 6.9   9.4    HGB Latest Ref Range: 12.1 - 17.0 g/dL 13.1   15.2    PLATELET Latest Ref Range: 150 - 400 K/uL 141 (L)   148 (L)    INR Latest Ref Range: 0.9 - 1.1   2.7 (H) 3.3 (H) 2.9 (H) 2.8 (H) 2.7 (H)   Sodium Latest Ref Range: 136 - 145 mmol/L 138 133 (L) 134 (L) 132 (L) 132 (L)   Potassium Latest Ref Range: 3.5 - 5.1 mmol/L 4.2 3.5 3.6 3.6 3.8   Chloride Latest Ref Range: 97 - 108 mmol/L 103 99 98 97 96 (L)   CO2 Latest Ref Range: 21 - 32 mmol/L 31 30 30 29 32   Glucose Latest Ref Range: 65 - 100 mg/dL 99 105 (H) 93 98 67   BUN Latest Ref Range: 6 - 20 MG/DL 13 10 9 10 9   Creatinine Latest Ref Range: 0.70 - 1.30 MG/DL 0.69 (L) 0.65 (L) 0.71 0.81 0.94   Bilirubin, total Latest Ref Range: 0.2 - 1.0 MG/DL 15.3 (H) 16.5 (H) 17.9 (H) 19.4 (H) 21.3 (H)   Albumin Latest Ref Range: 3.5 - 5.0 g/dL 2.0 (L) 1.7 (L) 1.7 (L) 1.6 (L) 1.9 (L)   ALT (SGPT) Latest Ref Range: 12 - 78 U/L 543 (H) 531 (H) 500 (H) 443 (H) 452 (H)   AST Latest Ref Range: 15 - 37 U/L 439 (H) 421 (H) 378 (H) 321 (H) 342 (H)   Alk.  phosphatase Latest Ref Range: 45 - 117 U/L 114 111 120 (H) 121 (H) 136 (H)       Wes Dudley MD  [facility] of [address] [place] [address] Drive [address] [address] Hinds [address] [place], [place] [address]Teressa  22. [phone]

## 2017-07-15 NOTE — PROGRESS NOTES
Pt refused IV fluids. States they make his abdomen \"bloat up\" and makes it hurt. Agreed to allow fluids to be restarted at midnight.

## 2017-07-16 NOTE — PROGRESS NOTES
Consent for paracentesis was unable to obtain. Attempted to call \"friend\" (in chart for emergency contact) @ [phone] and pt's cell [phone]. Dr. Sergio Monzon and Dr. Raffaele Espinoza conversing concerning procedure and paged Dr. Lele Vail concerning paracentesis.

## 2017-07-16 NOTE — PROGRESS NOTES
0710:  Bedside report received from [address] [place] [address] Randleman, RN. Pt intubated by Dr. Suzy Patel, ET tube at 22; breath sounds equal bilaterally. Stat CXR ordered. Pt set to settings of AC:  Vt 500, PEEP 6, FiO2 100%, Rate 22. NG tube remains to suction with sanguinous drainage noted. Patient jaundiced all over, extremities cool. BP remains low on phenylephrine, and norepinephrine. Patient opens eyes to pain, pupils equal, round, reactive. IR paged for paracentesis at bedside. 0830: Unable to detect blood pressure or pulse oximetry with patient on phenylephrine, norepinephrine, vasopressin. Telemetry shows , patient with faint radial pulses. Pt now unresponsive to pain  Dr. Lou Doty updated, will add a fourth agent. 0940:  CBC results shared with Dr. Kvng Aldana; no new orders at this time. Unable to obtain bp and oxygen levels; dopamine increased to 20 mcg/kg/min. 1015:  Pt's family at bedside; Dr. Albert Sampson paged. 1045:  Writer present for discussion between patient and family; DNR signed and in chart. 1130:  Pt continues to have carotid pulse; pt without pedal or radial pulses. 1240:  NG tube irrigated with 20 mL of sterile water and placed back to suction. Several blood clots removed. 1352:  Pt noted to be in asystole; Dr. Lou Doty updated and at bedside. 1412:  [facility] called regarding patient's passing, spoke with Chelsey Merida for release to  home. 1527:  LDAs removed; post-mortem care provided.          Asmita Dickinson RN

## 2017-07-16 NOTE — CONSULTS
[address], [address] [place] [address] Ave   [address] [facility]       Name:  Marvin Marshall   MR#:  [id number]   :  1965   Account #:  [de-identified]    Date of Consultation:  2017   Date of Adm:  07/10/2017       REQUESTING PHYSICIAN: Hospitalist service      INDICATIONS: Critical care management     CHIEF COMPLAINT: Unable to obtain, intubated and sedated. HISTORY OF PRESENT ILLNESS: The patient is a 49-year-old   gentleman with a history of HIV in addition to hepatitis. He is followed   by Dr. Sabrina Field. Unfortunately, he stopped taking his medications in   January secondary to his Medicaid not being renewed. He was   admitted to the hospital on July 10 and actually had some clinical   improvement until last night where he was noted to have increasing   abdominal distention in addition to hypotension. The patient was   transferred to the ICU, and very quickly developed 2 pressor need for   his blood pressure in addition to having marked acidosis. The patient   was intubated for airway protection. He is currently on 3 pressors at   this time with a very low blood pressure that is barely palpable. IR is   present for consideration of paracentesis at this time. MEDICAL HISTORY   1. HIV. 2. Hepatitis B.   3. Liver failure. FAMILY HISTORY: No lung disease. HOME MEDICATIONS   1. Odefsey. 2. Multivitamin. REVIEW OF SYSTEMS: Cannot obtain secondary to his being   intubated and sedated. ALLERGIES: NO KNOWN DRUG ALLERGIES. PHYSICAL EXAMINATION    VITAL SIGNS:  Temperature is 99.4, pulse 164, respiratory rate 33,   blood pressure 42/16, saturations 98% currently on a ventilator. GENERAL: Jaundiced  gentleman unresponsive. HEENT: Pupils are pinpoint, minimally reactive. NECK: Supple. LYMPHATIC: No palpable supraclavicular, submandibular or cervical   lymphadenopathy. LUNGS: Coarse. CARDIOVASCULAR: Markedly tachycardic. ABDOMEN: Distended. Fluid wave present. EXTREMITIES: Edema. LABORATORY DATA: White count of 1.3, hemoglobin 9.7, platelet   count 84. Sodium 135, chloride 94, CO2 19, BUN 17,   creatinine 2.67. Blood gas pH 6.87, PCO2 85, PAO2 43, bicarbonate of 14. That was   pre-intubation. Cannot obtain a blood count at this point secondary to poor peripheral   pulses. Chest x-ray showed intubated film, central line in place. IMPRESSION   1. Catastrophic multisystem organ failure. 2. Septic shock highly suspicious for spontaneous bacterial peritonitis. 3. HIV.   4. Hepatitis B.   5. Fulminant liver failure. 6. Acute kidney injury. DISCUSSION AND PLAN: Unfortunately, the patient has multisystem   organ failure with worsening status very quickly and he is unresponsive   now to three pressors at maximum. Will add a 4th pressor. However,   with his coagulopathy of an INR of 4.2 indication of multisystem organ   failure and the presumed diagnosis that this is actually spontaneous   bacterial peritonitis, do not feel that a paracentesis at this time is useful   secondary to the risk of harm to the patient at this time for bleeding   risks and volume shifts and how hemodynamically unstable he is. I   discussed this case with Dr. Maddison Turpin in addition to Interventional   Radiology and the primary hospitalist taking care of the patient. Despite all of our maximal effort, the feeling is the patient will succumb   to his illness and die within the next 2 to 24 hours. We will maintain   ICU care until the family dictates us otherwise. He is on maximum   support at this time with the addition of the 4th pressure in addition to   broad-spectrum antibiotic and ventilator support in addition to   bicarbonate. CRITICAL CARE TIME: Eighty minutes exclusive of procedures, not   overlapping with any other providers.          Waylon Mtz MD      SG / MS   D:  07/16/2017   12:21   T:  07/16/2017   14:58   Job #: [id number]

## 2017-07-16 NOTE — PROGRESS NOTES
Received phone call from Heber Kelly from the [facility] transfer center regarding a pending transfer to [facility]. Per the latest MD's notes, I do not see a discharge or transfer order. [facility] transfer center notified. [address] [facility] w/ Dr. Ashlee Mace. She verified there are no current orders to discharge and transfer to [facility]. Carleen Galvan, with the [facility] transfer center notified.

## 2017-07-16 NOTE — PROCEDURES
Intubation Note    Called to bedside secondary to  respiratory failure. Patient pre-oxygenated with 100% oxygen. Smooth RSI with Propofol 40 mg IV. GlideScope x 1    7.5 ETT taped and secured at 22 cm at the teeth.    + Bilateral BS, + Chest rise, + ETCO2    CXR pending.     Care turned over to covering Attending MD.

## 2017-07-16 NOTE — PROGRESS NOTES
Patient noted to be asystole on monitor. No BP, no heart beat ausculted. Dr. Yuriy Rodriguez at bedside.

## 2017-07-16 NOTE — PROGRESS NOTES
Hospitalist Progress Note    NAME: Polo Ling   :  1965   MRN:  [id number]       Assessment / Plan:  Multiorgan failure: SABINE, not POA, acute respiratory failure POA   Severe acidosis / lactic acidosis   In settings of Relapsing HBV infection in settings of cirrhosis complicated by possible SBP  Coagulopathy   Pancytopenia  GI bleeding ( coffee ground in NG)   Hyponatremia   Pt deteriorated since 4 am, prognosis is graved now. DDNR was signed by family. Family wants to continue aggressive care this time. On 3 pressors at max   C/w empiric broad spectrum AB for possible SBP   Too sick for surgical intervention or paracentesis  On acetadote   All consultants help was greatly appreciated  Diet: NPO   MRCP done on  shows no obstruction  US 7/10: Abnormal liver possible cirrhosis. Bone alignment splenomegaly,  distended gallbladder with sludge. Small amount of ascites. HIV  Dr Ari Celis help appreciated this admission   c/w anti retroviral medication    Lactic acidosis, POA, resolved   Underweight, protein calory malnutrition, BMI 18.81   Coagulopathy, s/p Vit K improved initially   Thrombocytopenia, stable, due to cirrhosis   GERD, c/w home H2 blocker          Code Status: d/w family at bedside at length and pt was made [facility]   Surrogate Decision Maker: Mother has mild dementia. Call sister or brother   Sister: Dave Henderson [phone] ( h), [phone] ( c)  Brother: Igor Phillips [phone] (h); [phone] ( c)   Family does not know form HIV diagnosis and he does not want that to be disclosed to the family  DVT Prophylaxis: SCD ( coagulopathy)     Baseline: weak and frail lives with brother Clyde Ree  Recommended Disposition: TBD - per GI      Subjective:     Chief Complaint / Reason for Physician Visit: following elevated LFTs / HIV/ coagulopathy   Pt crushed this am, deteriorated significantly since 4 am today  Not intubated. On 3 pressors at max       Discussed with RN events overnight.      Review of Systems:  Symptom  Comments  Symptom Y/N Comments   Fever/Chills    Chest Pain     Poor Appetite    Edema     Cough    Abdominal Pain     Sputum    Joint Pain     SOB/FAIR    Pruritis/Rash     Nausea/vomit    Tolerating PT/OT     Diarrhea    Tolerating Diet     Constipation    Other       Could NOT obtain due to: Intubated      Objective:     VITALS:   Last 24hrs VS reviewed since prior progress note. Most recent are:  Patient Vitals for the past 24 hrs:   Temp Pulse Resp BP SpO2   07/16/17 0805 - (!) 166 (!) 36 (!) 46/24 -   07/16/17 0800 - (!) 167 (!) 35 (!) 42/16 -   07/16/17 0745 99.4 °F (37.4 °C) (!) 164 (!) 33 102/77 98 %   07/16/17 0735 - (!) 166 29 (!) 72/58 -   07/16/17 0727 - (!) 166 (!) 35 - -   07/16/17 0725 - (!) 166 22 (!) 50/25 (!) 5 %   07/16/17 0700 - (!) 158 27 (!) 78/39 -   07/16/17 0355 - (!) 148 - (!) 84/46 -   07/16/17 0318 97.5 °F (36.4 °C) (!) 149 (!) 40 100/66 93 %   07/16/17 0243 97.4 °F (36.3 °C) (!) 137 16 108/63 94 %   07/16/17 0028 - (!) 133 - - -   07/16/17 0005 97.6 °F (36.4 °C) (!) 124 18 117/69 93 %   07/15/17 2032 98.3 °F (36.8 °C) (!) 104 16 108/74 98 %   07/15/17 1529 97.9 °F (36.6 °C) 97 16 109/67 99 %   07/15/17 1137 97.5 °F (36.4 °C) 81 16 111/68 97 %       Intake/Output Summary (Last 24 hours) at 07/16/17 0857  Last data filed at 07/16/17 0355   Gross per 24 hour   Intake                0 ml   Output              600 ml   Net             -600 ml        PHYSICAL EXAM:  General: WD, WN. Intubated. No acute distress. Mottled   EENT:  EOMI. Icteric sclerae. MMM  Resp:  CTA bilaterally, no wheezing or rales. No accessory muscle use  CV:  Regular  rhythm,  No edema  GI:  Soft, + distended and tense.  +Bowel sounds  Neurologic:  Intubated    Psych:   Poor insight. Not anxious nor agitated  Skin:  No rashes.   + jaundice    Reviewed most current lab test results and cultures  YES  Reviewed most current radiology test results   YES  Review and summation of old records today NO  Reviewed patient's current orders and MAR    YES  PMH/SH reviewed - no change compared to H&P  ________________________________________________________________________  Care Plan discussed with:    Comments   Patient y    Family  y Family bedside    RN y    Care Manager     Consultant  y GI / intensivist                      Multidiciplinary team rounds were held today with , nursing, pharmacist and clinical coordinator. Patient's plan of care was discussed; medications were reviewed and discharge planning was addressed. ________________________________________________________________________  Total NON critical care TIME:   Minutes    Total CRITICAL CARE TIME Spent:  50 Minutes non procedure based  Patient is critically ill and at high risk for further deterioration. Complex decision making was performed which includes reviewing the patient's past medical records, current laboratory results, and actual Xray films. I have also discussed this case with the involved ED physician and with Pulmonary Critical care. Critical Care:  The reason for providing this level of medical care for this critically ill patient was due a critical illness that impaired one or more vital organ systems such that there was a high probability of imminent or life threatening deterioration in the patients condition. This care involved high complexity decision making to assess, manipulate, and support vital system functions, to treat this degreee vital organ system failure and to prevent further life threatening deterioration of the patients condition.    I was immediately available to the patient.               Comments   >50% of visit spent in counseling and coordination of care      ________________________________________________________________________  Pamela Silveira MD     Procedures: see electronic medical records for all procedures/Xrays and details which were not copied into this note but were reviewed prior to creation of Plan. LABS:  I reviewed today's most current labs and imaging studies.   Pertinent labs include:  Recent Labs      07/14/17 0413   WBC  9.4   HGB  15.2   HCT  43.1   PLT  148*     Recent Labs      07/16/17   0229  07/15/17   0419  07/14/17   0413   NA   --   132*  132*   K   --   3.8  3.6   CL   --   96*  97   CO2   --   32  29   GLU   --   67  98   BUN   --   9  10   CREA   --   0.94  0.81   CA   --   8.6  8.2*   ALB   --   1.9*  1.6*   TBILI   --   21.3*  19.4*   SGOT   --   342*  321*   ALT   --   452*  443*   INR  4.2*  2.7*  2.8*       Signed: Mariya Hernandez MD

## 2017-07-16 NOTE — PROGRESS NOTES
Responded to page about pt passing. Provided support for sisters who were present. Provided empathetic listening as family shared memories of pt. Both were tearful at different times, but both seemed to be coping well. Family shared that Kamron Brown was the youngest sibling and quite a free spirit with amazing creativity. Family was asked to step out while nursing staff cleaned up pt. Family expects to return briefly for final good-byes.  available for additional support as needed/possible. Please call DAWIT.     Cyndy Hernandez.

## 2017-07-16 NOTE — PROGRESS NOTES
07/16/17 0005   Vital Signs   Temp 97.6 °F (36.4 °C)   Pulse (Heart Rate) (!) 124   Resp Rate 18   O2 Sat (%) 93 %   Level of Consciousness Alert   /69   MAP (Calculated) 85   MEWS Score 3   Pain 1   Pain Scale 1 Numeric (0 - 10)   Pain Intensity 1 5   Pain Location 1 Abdomen   Pain Orientation 1 Mid;Left;Right   Pain Description 1 Sharp     0029 Patient complaining of an increase in abdominal pain, described as sharp in nature and \"i feel extremely bloated\". Pt reports feeling nauseated with one episode of vomiting (unassessed by RN; pt had already disposed of emesis but reports brown in color). Bowel sounds hypoactive, last BM Saturday, reports passing flatus. Heart rate is sustaining 120s-130s. MD paged. Spoke w/ Dr. Miguel Forde, order received for stat abd xray. Xray called and notified, stated they would be up.  0105 Pt wheeled to xray. 0115 Xray complete. Pt assisted back to bed.  0125 Xray results \"IMPRESSION: Findings consistent with small bowel obstruction. \" Dr[facility] Forde [facility]. Orders received to place an NG tube to suction and make NPO w/ ice chips. Order also received for IV phenergan q6h prn.  0200 Pt educated on SBO, what it is, signs and symptoms, complications, and treatments. Patient pamphlet left at bedside. Patient consents to having ng tube placed. NG tube placed @ 75cm, hooked to continuous low suction. 600ml dark coffee ground emesis returned in output. Xray notified of second KUB order to confirm ng placement. 0256 IV phenergan and normal saline infusion started. New 22g IV placed in the right forearm. 0319 Xray at the bedside. HR continues to climb and is holding at 149, respirations 40, patient visibly in respiratory distress, new red rash has formed on patients abdomen and thighs. RRT called. 0425 Patient taken to CT and then transferred to CCU. All personal belongings in the room were packed up and transported with patient.  Dr. Farideh Goldman notified in person in CT of all new assessment findings. 0500 TRANSFER - OUT REPORT:    Bedside report given to Kris(name) on Siomara Journey  being transferred to CCU(unit) for urgent transfer       Report consisted of patients Situation, Background, Assessment and   Recommendations(SBAR). Information from the following report(s) SBAR, Kardex, Procedure Summary, Intake/Output, MAR, Recent Results and Cardiac Rhythm ST was reviewed with the receiving nurse. Lines:   Quad Lumen 07/16/17 Right Subclavian (Active)       Peripheral IV 06/29/17 Right Antecubital (Active)       Peripheral IV 07/10/17 Left Forearm (Active)   Site Assessment Clean, dry, & intact 7/15/2017  8:00 PM   Phlebitis Assessment 0 7/15/2017  8:00 PM   Infiltration Assessment 0 7/15/2017  8:00 PM   Dressing Status Clean, dry, & intact 7/15/2017  8:00 PM   Dressing Type Transparent;Tape 7/15/2017  8:00 PM   Hub Color/Line Status Pink; Infusing 7/15/2017  8:00 PM   Alcohol Cap Used No 7/10/2017  6:46 PM       Peripheral IV 07/16/17 Right Forearm (Active)   Site Assessment Clean, dry, & intact 7/16/2017  2:56 AM   Phlebitis Assessment 0 7/16/2017  2:56 AM   Infiltration Assessment 0 7/16/2017  2:56 AM   Dressing Status Clean, dry, & intact 7/16/2017  2:56 AM   Dressing Type Transparent;Tape 7/16/2017  2:56 AM   Hub Color/Line Status Blue; Infusing 7/16/2017  2:56 AM        Opportunity for questions and clarification was provided.       Patient transported with:   O2 @ 2 liters  Registered Nurse

## 2017-07-16 NOTE — PROGRESS NOTES
Pt with increasing abdominal distension  AXR obtained- shows SBO  -ngt as pt nausea/vomitting/distended  -symtpom management  -consider subspecialist consult

## 2017-07-16 NOTE — PROCEDURES
Central Line Placement    Start time: 7/16/2017 5:39 AM  End time: 7/16/2017 5:57 AM  Performed by: Hina Rater by: Annie Deleon     Indications: need for vasopressors and vascular access  Preanesthetic Checklist: patient identified, risks and benefits discussed, anesthesia consent, site marked, patient being monitored and timeout performed      Pre-procedure: All elements of maximal sterile barrier technique followed? Yes    Maximal barrier precautions followed, 2% Chlorhexidine for cutaneous antisepsis and Hand hygiene performed prior to catheter insertion            Procedure:   Prep:  ChloraPrep  Location:  Subclavian  Orientation:  Right  Patient position:  Trendelenburg  Catheter type:  Quad lumen  Catheter size:  8.5 Fr  Catheter length:  16 cm  Number of attempts:  1  Successful placement: Yes      Assessment:   Post-procedure:  Catheter secured and sterile dressing applied  Assessment:  Blood return through all ports and guidewire removal verified  Insertion:  Uncomplicated  Patient tolerance:  Patient tolerated the procedure well with no immediate complications  CXR pending.     Care turned over to covering Attending MD.

## 2017-07-16 NOTE — PROGRESS NOTES
Patient came in at approx. 0400 on NRM, infusing Acetylcystine 64.5. Patient is declining, paged Dr. Franko Kulkarni for GI consult regarding ascites and SBO. Spoked to Dr. Franko Kulkarni and ordered to do paracentesis by IR. Gurmeet Ayon re: ABG critical results and ordered to intubate, put in central line, and add more pressors if needed. Infused 4 amps of bicarb. Patient was intubated by Dr. Kassidy Granger and central line put in by Dr. Joshua Browne. CXR shows the tip is at atrium. NGT suction already filled up one canister dark brown drainage noted and told Dr. Franko Kulkarni. Bedside report given to Drew Harrell RN via [employer].

## 2017-07-16 NOTE — PROGRESS NOTES
Spiritual Care Assessment/Progress Notes    Gajoan Members [id number]  [id number]    1965  45 y.o.  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 07/15/2011        Date: 7/16/2017       Level of Protestant/Spiritual Activity: (as reported by family)  [x]         Involved in raúl tradition/spiritual practice    []         Not involved in raúl tradition/spiritual practice  [x]         Spiritually oriented    []         Claims no spiritual orientation    []         seeking spiritual identity  []         Feels alienated from Jain practice/tradition  []         Feels angry about Jain practice/tradition  [x]         Spirituality/Jain tradition is a resource for coping at this time.   []         Not able to assess due to medical condition    Services Provided Today:  []         crisis intervention    []         reading Scriptures  [x]         spiritual assessment    [x]         prayer  [x]         empathic listening/emotional support  []         rites and rituals (cite in comments)  []         life review     []         Jain support  []         theological development   []         advocacy  []         ethical dialog     []         blessing  []         bereavement support    [x]         support to family  []         anticipatory grief support   []         help with AMD  []         spiritual guidance    []         meditation      Spiritual Care Needs  [x]         Emotional Support  [x]         Spiritual/Hinduism Care  []         Loss/Adjustment  []         Advocacy/Referral                /Ethics  []         No needs expressed at               this time  []         Other: (note in               comments)  [address]  [x]         Follow up visits with               pt/family  []         Provide materials  []         Schedule sacraments  []         Contact Community               Clergy  []         Follow up as needed  []         Other: (note in               comments)     Comments: Responded to family in CCU who were upset. Provided empathetic listening as wife shared that she had just seen him last night and was upset seeing him like this in CCU. Daughter and son also present. Dtr reported that mother has mild dementia. Prayer was gratefully received. Family reports being connected with [employer] and contacted their  (Rev. Lemon) this morning.  will follow up as possible.       For additional  support please call [phone] Jena Mojica, M.Div.

## 2017-07-16 NOTE — PROGRESS NOTES
GI PROGRESS NOTE  Sandie Pinzon for Sheela Arana)  NAME:             Allyssa Hansen   :              1965   MRN:              [id number]   Admit Date:     7/10/2017  Todays Date:  2017      Subjective: Major change overnight. Progressive tachypnea, tachycardia and ab pain, rapid response called and ultimately [facility] and now he's maxed out on three pressors. SBO, NG placed with copious dark, bilious output. Reviewed case with Dr. Hyacinth Delacruz, who had already been made aware of changes. CT as below: \"IMPRESSION:     1. Evidence for pulmonary edema. 2. Large amount of ascites. Stranding of omentum and small bowel mesentery. 3. Mid jejunal small bowel obstruction. Thickening of jejunal small bowel loops  and possibly ascending colon. 4. Heterogeneous attenuation and enhancement of the liver suggesting cirrhosis. Borderline splenomegaly. \"    Medications-reviewed     Current Facility-Administered Medications   Medication Dose Route Frequency    promethazine (PHENERGAN) 25 mg in 0.9% sodium chloride 50 mL IVPB  25 mg IntraVENous Q6H PRN    mupirocin (BACTROBAN) 2 % ointment   Both Nostrils BID    morphine injection 0.52 mg  0.52 mg IntraVENous Q4H PRN    levoFLOXacin (LEVAQUIN) 750 mg in D5W IVPB  750 mg IntraVENous Q24H    vasopressin (VASOSTRICT) 20 Units in 0.9% sodium chloride 100 mL infusion  0.01-0.04 Units/min IntraVENous TITRATE    sodium bicarbonate 8.4 % (1 mEq/mL) injection        PHENYLephrine (NEOSYNEPHRINE) 10 mg/mL injection        PHENYLephrine (NEOSYNEPHRINE) 30,000 mcg in 0.9% sodium chloride 250 mL infusion   mcg/min IntraVENous TITRATE    sodium bicarbonate 8.4 % (1 mEq/mL) injection        sodium bicarbonate 8.4 % (1 mEq/mL) injection        propofol (DIPRIVAN) 10 mg/mL injection        succinylcholine (ANECTINE) 20 mg/mL injection        NOREPINEPHrine (LEVOPHED) 16 mg/250 ml D5W infusion  2-200 mcg/min IntraVENous TITRATE    piperacillin-tazobactam (ZOSYN) 3.375 g in 0.9% sodium chloride (MBP/ADV) 100 mL  3.375 g IntraVENous Q6H    DOPamine (INTROPIN) 800 mg/250 mL (3,200 mcg/mL) infusion  0-20 mcg/kg/min IntraVENous TITRATE    melatonin tablet 3 mg  3 mg Oral QHS PRN    0.9% sodium chloride infusion  75 mL/hr IntraVENous CONTINUOUS    acetylcysteine (ACETADOTE) 6,300 mg in dextrose 5% 1,000 mL infusion  100 mg/kg IntraVENous CONTINUOUS    therapeutic multivitamin (THERAGRAN) tablet 1 Tab  1 Tab Oral DAILY    ondansetron (ZOFRAN) injection 4 mg  4 mg IntraVENous Q6H PRN    oxyCODONE IR (ROXICODONE) tablet 5 mg  5 mg Oral Q4H PRN    emtricitabine-tenofovir (TDF) (TRUVADA) 200-300 mg per tablet 1 Tab  1 Tab Oral DAILY    raltegravir (ISENTRESS) tablet 400 mg  400 mg Oral BID        Objective:     Patient Vitals for the past 8 hrs:   BP Temp Pulse Resp SpO2   07/16/17 0805 (!) 46/24 - (!) 166 (!) 36 -   07/16/17 0800 (!) 42/16 - (!) 167 (!) 35 -   07/16/17 0745 102/77 99.4 °F (37.4 °C) (!) 164 (!) 33 98 %   07/16/17 0735 (!) 72/58 - (!) 166 29 -   07/16/17 0727 - - (!) 166 (!) 35 -   07/16/17 0725 (!) 50/25 - (!) 166 22 (!) 5 %   07/16/17 0700 (!) 78/39 - (!) 158 27 -   07/16/17 0355 (!) 84/46 - (!) 148 - -   07/16/17 0318 100/66 97.5 °F (36.4 °C) (!) 149 (!) 40 93 %   07/16/17 0243 108/63 97.4 °F (36.3 °C) (!) 137 16 94 %        07/14 1901 - 07/16 0700  In: -   Out: 600     EXAM:  GEN: Jaundiced   NEURO-sedated, intubated, unresponsive   HEENT-sclera icteric   LUNGS-coarse, mech breath sounds   COR-tachycardia   ABD- soft, distended with ascites; no clear tenderness or rebound, but patient is not responsive.      LABS:  Recent Labs      07/14/17 0413   WBC  9.4   HGB  15.2   HCT  43.1   PLT  148*     Recent Labs      07/15/17   0419  07/14/17   0413   NA  132*  132*   K  3.8  3.6   CL  96*  97   CO2  32  29   BUN  9  10   CREA  0.94  0.81   GLU  67  98   CA  8.6  8.2*     Recent Labs      07/15/17   0419  07/14/17 0413   SGOT  342*  321*   AP  136*  121*   TBILI  21.3*  19.4*   TP  6.9  6.2*   ALB  1.9*  1.6*   GLOB  5.0*  4.6*   ALT  452*  443*     Recent Labs      07/16/17   0229  07/15/17   0419  07/14/17   0413   INR  4.2*  2.7*  2.8*   PTP  44.8*  28.1*  29.4*        Assessment:   Septic shock  SBO  Acute flare of hepatitis B with hepatic decompensation. Chronic HBV, only restarted meds this admission  HIV, only restarted meds this admission       Active Problems:    HIV (human immunodeficiency virus infection)1991 (7/15/2011)      Viral hepatitis B chronic ([facility] [address] (7/15/2011)      Jaundice (7/10/2017)      Coagulopathy ([facility] [address] (7/10/2017)        Plan:   Continue NG to LIWS  empiric abx, presumes SBP  Pressors are maxed out  Would treat as SBP, unstable for paracentesis now anyway  Appreciate critical care team's help. Surgery consulted, but no question he is unfit for surgery at this time  Prognosis is grave, family has been called in. I did discuss his care with Dr. Sunday Muhammad, at this point he's not a transplant candidate, he's far too unstable to even be transferred.

## 2017-07-16 NOTE — PROGRESS NOTES
Dr. Antonina Mace spoke with Dr. Silverio Hunt and advised paracentes cancelled.   (Dr. Tomás Capone also spoke w/Dr. Antonina Mace and aware)

## 2017-07-17 NOTE — ROUTINE PROCESS
Quality: Chart reviewed for death and restraints. Restraints (bilateral soft wrist) noted 7/16/17. Restraints not a contributing factor in patient death. Documented for tracking according to CMS guidelines at 09:01 on 7/17/17.

## 2017-07-17 NOTE — DISCHARGE SUMMARY
Hospitalist Discharge Summary     Patient ID:  Bay Childs  [id number]  45 y.o.  1965    PCP on record: Ronaldo Watson DO    Admit date: 7/10/2017  Discharge date and time: 7/17/2017      DISCHARGE DIAGNOSIS:    Multiorgan failure: SABINE, not POA, acute respiratory failure POA   Severe acidosis / lactic acidosis   In settings of Relapsing HBV infection in settings of cirrhosis complicated by possible SBP and septic shock   Coagulopathy   Pancytopenia  GI bleeding ( coffee ground in NG)   Hyponatremia   HIV  Lactic acidosis, POA, resolved   Underweight  severe protein calory malnutrition, BMI 18.81   Coagulopathy  Thrombocytopenia  GERD  Code Status: DDNR         CONSULTATIONS:  IP CONSULT TO GENERAL SURGERY    Excerpted HPI from H&P of Marcelle Mckenzie MD:    HISTORY OF PRESENT ILLNESS:     Heidi Michaud is a 45 y.o.  male  with PMhx significant for HIV, hepatitis B, cirrhosis and liver failure who presents ambulatory to the ED with cc of diffuse abdominal pain and fluid on his abdomen.  Pt's pain is diffuse, started 2 days ago, and states it is similar to a previous episode where he had fluid in his abdomen.  Pt also c/o nausea, vomiting, rectal bleeding, and jaundice.  He states that he took pain medication 2 days ago without relief.  He notes that he was taken off of his anti-viral medication because he was denied disability, but has since been placed back on the anti-viral medication since his ED visit on 6/29/17.  Pt denies diarrhea, hematochezia, hematuria, fever, and chills. At this time patient is lying in bed is jaundiced, has some abdominal discomfort, has choluria and acholia, denies chest pain ,no SOB, no N/V no diarrhea, no urinary symptoms, no other associated symptoms. We were asked to admit for work up and evaluation of the above problems.      ______________________________________________________________________  DISCHARGE SUMMARY/HOSPITAL COURSE:  for full details see H&P, daily progress notes, labs, consult notes. Multiorgan failure: SABINE, not POA, acute respiratory failure POA   Severe acidosis / lactic acidosis   In settings of Relapsing HBV infection in settings of cirrhosis complicated by possible SBP and septic shock   Coagulopathy   Pancytopenia  GI bleeding ( coffee ground in NG)   Hyponatremia   Pt deteriorated since 4 am, prognosis is graved now. DDNR was signed by family. Family wants to continue aggressive care this time. On 3 pressors at max   C/w empiric broad spectrum AB for possible SBP   Too sick for surgical intervention or paracentesis  On acetadote   All consultants help was greatly appreciated  Diet: NPO   MRCP done on  shows no obstruction  US 7/10: Abnormal liver possible cirrhosis. Bone alignment splenomegaly,  distended gallbladder with sludge. Small amount of ascites.     HIV  Dr Jacey Hernandez help appreciated this admission   c/w anti retroviral medication     Lactic acidosis, POA, resolved   Underweight, protein calory malnutrition, BMI 18.81   Coagulopathy, s/p Vit K improved initially   Thrombocytopenia, stable, due to cirrhosis   GERD, c/w home H2 blocker              Code Status: d/w family at bedside at length and pt was made [facility]   Surrogate Decision Maker:   Mother has mild dementia. Call sister or brother   Sister: Penny Mix [phone] ( h), [phone] ( c)  Brother: Hubert Pap [phone] (h); [phone] ( c)   Family does not know form HIV diagnosis and he does not want that to be disclosed to the family  DVT Prophylaxis: SCD ( coagulopathy)      Baseline: weak and frail lives with brother Jenny Martinez  Recommended Disposition:             _______________________________________________________________________  Patient seen and examined by me on discharge day.   See separate progress notes   _______________________________________________________________________  DISCHARGE MEDICATIONS:   Discharge Medication List as of 2017 5:02 PM          My Recommended Diet, Activity, Wound Care, and follow-up labs are listed in the patient's Discharge Insturctions which I have personally completed and reviewed.     _______________________________________________________________________  DISPOSITION:              Home with Family:    Home with HH/PT/OT/RN:    SNF/LTC:    ERICK:    OTHER:        Condition at Discharge:  Stable  _______________________________________________________________________  Follow up with:   PCP : Bob Rose III, DO  Follow-up Information     Follow up With Details Comments [address] III, DO On 2017 10:30am  hospital follow-up [address] [facility] [address]. [phone]                Total time in minutes spent coordinating this discharge (includes going over instructions, follow-up, prescriptions, and preparing report for sign off to her PCP) :  > 30 minutes    Signed:  Rosa Isela Bray MD

## 2017-07-18 ENCOUNTER — PATIENT OUTREACH (OUTPATIENT)
Dept: INTERNAL MEDICINE CLINIC | Age: 52
End: 2017-07-18

## 2019-05-07 NOTE — PROGRESS NOTES
Hospitalist Progress Note    NAME: Alysia Dang   :  1965   MRN:  [id number]       Assessment / Plan:  Relapsing HBV infection in settings of cirrhosis   + jaundice, LFTs slowly improving, INR better s/p vit K x 3    hyperbilirubinemia, coagulopathy, hypoalbuminemia, no ascites  Monitor for encephalopathy - none at present   S/p acetadote   GI help was greatly appreciated: INR is better - no need to transfer to [facility]   Started on BB this admission but BP was borderline low 90th, so was DC   Diet: per GI, on CLD   MRCP done on  shows no obstruction  US 7/10: Abnormal liver possible cirrhosis. Bone alignment splenomegaly,  distended gallbladder with sludge. Small amount of ascites. HIV  Dr Shalom Odom help appreciated   c/w anti retroviral medication    Lactic acidosis, POA, resolved   Underweight, BMI 18.81   Coagulopathy, s/p Vit K  Thrombocytopenia, stable, due to cirrhosis   GERD, c/w home H2 blocker          Code Status: Full Code   Surrogate Decision Maker: sister Mignon Valdez, brother Jessica Rock [phone]  Family does not know form HIV diagnosis and he does not want that to be disclosed to the family  DVT Prophylaxis: SCD ( coagulopathy)     Baseline: weak and frail lives with brother Jessica Rock  Recommended Disposition: TBD - per GI      Subjective:     Chief Complaint / Reason for Physician Visit: following elevated LFTs / HIV/ coagulopathy   Reports not feeling well today, feeling nauseous       Discussed with RN events overnight. Review of Systems:  Symptom Y/N Comments  Symptom Y/N Comments   Fever/Chills n   Chest Pain n    Poor Appetite    Edema     Cough    Abdominal Pain n    Sputum    Joint Pain     SOB/FAIR n   Pruritis/Rash     Nausea/vomit n   Tolerating PT/OT     Diarrhea n   Tolerating Diet     Constipation n   Other       Could NOT obtain due to:      Objective:     VITALS:   Last 24hrs VS reviewed since prior progress note.  Most recent are:  Patient Vitals for the past 24 hrs:   Temp Pulse Pt of Dr Paula Bolden here for scheduled, lynch induction for term gestation. Denies any vag bleeding, leaking of fluid, or contractions. Reports +fm. Resp BP SpO2   07/13/17 1119 97.6 °F (36.4 °C) 60 16 101/64 93 %   07/13/17 0816 97.9 °F (36.6 °C) 68 16 105/70 92 %   07/13/17 0322 97.8 °F (36.6 °C) 60 16 114/77 94 %   07/12/17 2342 97.5 °F (36.4 °C) 60 16 110/74 92 %   07/12/17 1847 97.6 °F (36.4 °C) (!) 55 16 104/67 93 %   07/12/17 1531 97.8 °F (36.6 °C) (!) 54 16 108/72 93 %       Intake/Output Summary (Last 24 hours) at 07/13/17 1511  Last data filed at 07/13/17 1454   Gross per 24 hour   Intake          2052.31 ml   Output                0 ml   Net          2052.31 ml        PHYSICAL EXAM:  General: WD, WN. Alert, cooperative, no acute distress    EENT:  EOMI. Icteric sclerae. MMM  Resp:  CTA bilaterally, no wheezing or rales. No accessory muscle use  CV:  Regular  rhythm,  No edema  GI:  Soft, Non distended, Non tender.  +Bowel sounds  Neurologic:  Alert and oriented X 3, normal speech,   Psych:   Good insight. Not anxious nor agitated  Skin:  No rashes. + jaundice    Reviewed most current lab test results and cultures  YES  Reviewed most current radiology test results   YES  Review and summation of old records today    NO  Reviewed patient's current orders and MAR    YES  PMH/SH reviewed - no change compared to H&P  ________________________________________________________________________  Care Plan discussed with:    Comments   Patient y    Family      RN y    Care Manager     Consultant  y GI                      Multidiciplinary team rounds were held today with , nursing, pharmacist and clinical coordinator. Patient's plan of care was discussed; medications were reviewed and discharge planning was addressed.      ________________________________________________________________________  Total NON critical care TIME: 35  Minutes    Total CRITICAL CARE TIME Spent:   Minutes non procedure based      Comments   >50% of visit spent in counseling and coordination of care ________________________________________________________________________  Hoda Puente MD     Procedures: see electronic medical records for all procedures/Xrays and details which were not copied into this note but were reviewed prior to creation of Plan. LABS:  I reviewed today's most current labs and imaging studies.   Pertinent labs include:  Recent Labs      07/11/17 0134   WBC  6.9   HGB  13.1   HCT  38.3   PLT  141*     Recent Labs      07/13/17   0325  07/12/17   0407  07/11/17 0134   NA  134*  133*  138   K  3.6  3.5  4.2   CL  98  99  103   CO2  30  30  31   GLU  93  105*  99   BUN  9  10  13   CREA  0.71  0.65*  0.69*   CA  8.1*  7.8*  8.3*   MG   --    --   2.0   ALB  1.7*  1.7*  2.0*   TBILI  17.9*  16.5*  15.3*   SGOT  378*  421*  439*   ALT  500*  531*  543*   INR  2.9*  3.3*  2.7*       Signed: Hoda Puente MD

## 2020-07-23 NOTE — PROGRESS NOTES
Visited by Clive Cohen Partner 6/30/17.         SARTHAK Ogden, [facility], [address]     Paging Service  [phone]